# Patient Record
Sex: MALE | Race: WHITE | NOT HISPANIC OR LATINO | Employment: OTHER | URBAN - NONMETROPOLITAN AREA
[De-identification: names, ages, dates, MRNs, and addresses within clinical notes are randomized per-mention and may not be internally consistent; named-entity substitution may affect disease eponyms.]

---

## 2019-07-18 ENCOUNTER — TRANSCRIBE ORDERS (OUTPATIENT)
Dept: CARDIOLOGY | Facility: HOSPITAL | Age: 64
End: 2019-07-18

## 2019-07-18 DIAGNOSIS — R06.02 SHORTNESS OF BREATH: Primary | ICD-10-CM

## 2019-07-18 DIAGNOSIS — J43.9 PULMONARY EMPHYSEMA, UNSPECIFIED EMPHYSEMA TYPE (HCC): ICD-10-CM

## 2019-07-23 ENCOUNTER — HOSPITAL ENCOUNTER (OUTPATIENT)
Dept: CARDIOLOGY | Facility: HOSPITAL | Age: 64
Discharge: HOME | End: 2019-07-23
Admitting: SPECIALIST

## 2019-07-23 DIAGNOSIS — J43.9 PULMONARY EMPHYSEMA, UNSPECIFIED EMPHYSEMA TYPE (HCC): ICD-10-CM

## 2019-07-23 DIAGNOSIS — R06.02 SHORTNESS OF BREATH: ICD-10-CM

## 2019-07-23 PROCEDURE — 93306 TTE W/DOPPLER COMPLETE: CPT | Performed by: INTERNAL MEDICINE

## 2019-07-23 PROCEDURE — 93306 TTE W/DOPPLER COMPLETE: CPT

## 2019-08-08 ENCOUNTER — TELEPHONE (OUTPATIENT)
Dept: CARDIOLOGY | Facility: CLINIC | Age: 64
End: 2019-08-08

## 2019-08-08 LAB
BH CV ECHO MEAS - ACS: 2.5 CM
BH CV ECHO MEAS - AO MEAN PG: 3.7 MMHG
BH CV ECHO MEAS - AO ROOT AREA (BSA CORRECTED): 1.7
BH CV ECHO MEAS - AO ROOT AREA: 9.8 CM^2
BH CV ECHO MEAS - AO ROOT DIAM: 3.5 CM
BH CV ECHO MEAS - AO V2 MEAN: 90.8 CM/SEC
BH CV ECHO MEAS - AO V2 VTI: 29 CM
BH CV ECHO MEAS - BSA(HAYCOCK): 2.2 M^2
BH CV ECHO MEAS - BSA: 2.1 M^2
BH CV ECHO MEAS - BZI_BMI: 30.3 KILOGRAMS/M^2
BH CV ECHO MEAS - BZI_METRIC_HEIGHT: 177.8 CM
BH CV ECHO MEAS - BZI_METRIC_WEIGHT: 95.7 KG
BH CV ECHO MEAS - EDV(CUBED): 91.7 ML
BH CV ECHO MEAS - EDV(MOD-SP4): 64 ML
BH CV ECHO MEAS - EDV(TEICH): 92.9 ML
BH CV ECHO MEAS - EF(CUBED): 42.6 %
BH CV ECHO MEAS - EF(MOD-SP4): 57.8 %
BH CV ECHO MEAS - EF(TEICH): 35.4 %
BH CV ECHO MEAS - ESV(CUBED): 52.7 ML
BH CV ECHO MEAS - ESV(MOD-SP4): 27 ML
BH CV ECHO MEAS - ESV(TEICH): 60 ML
BH CV ECHO MEAS - FS: 16.9 %
BH CV ECHO MEAS - IVS/LVPW: 1
BH CV ECHO MEAS - IVSD: 1.3 CM
BH CV ECHO MEAS - LA DIMENSION: 4.6 CM
BH CV ECHO MEAS - LA/AO: 1.3
BH CV ECHO MEAS - LV DIASTOLIC VOL/BSA (35-75): 30 ML/M^2
BH CV ECHO MEAS - LV IVRT: 0.09 SEC
BH CV ECHO MEAS - LV MASS(C)D: 224.6 GRAMS
BH CV ECHO MEAS - LV MASS(C)DI: 105.2 GRAMS/M^2
BH CV ECHO MEAS - LV SYSTOLIC VOL/BSA (12-30): 12.6 ML/M^2
BH CV ECHO MEAS - LVIDD: 4.5 CM
BH CV ECHO MEAS - LVIDS: 3.7 CM
BH CV ECHO MEAS - LVLD AP4: 6.6 CM
BH CV ECHO MEAS - LVLS AP4: 5.8 CM
BH CV ECHO MEAS - LVOT AREA (M): 3.8 CM^2
BH CV ECHO MEAS - LVOT AREA: 3.7 CM^2
BH CV ECHO MEAS - LVOT DIAM: 2.2 CM
BH CV ECHO MEAS - LVPWD: 1.3 CM
BH CV ECHO MEAS - MV A MAX VEL: 67.1 CM/SEC
BH CV ECHO MEAS - MV DEC SLOPE: 305.5 CM/SEC^2
BH CV ECHO MEAS - MV E MAX VEL: 86.4 CM/SEC
BH CV ECHO MEAS - MV E/A: 1.3
BH CV ECHO MEAS - RAP SYSTOLE: 10 MMHG
BH CV ECHO MEAS - RVDD: 3.6 CM
BH CV ECHO MEAS - RVSP: 32.6 MMHG
BH CV ECHO MEAS - SI(AO): 132.7 ML/M^2
BH CV ECHO MEAS - SI(CUBED): 18.3 ML/M^2
BH CV ECHO MEAS - SI(MOD-SP4): 17.3 ML/M^2
BH CV ECHO MEAS - SI(TEICH): 15.4 ML/M^2
BH CV ECHO MEAS - SV(AO): 283.4 ML
BH CV ECHO MEAS - SV(CUBED): 39 ML
BH CV ECHO MEAS - SV(MOD-SP4): 37 ML
BH CV ECHO MEAS - SV(TEICH): 32.9 ML
BH CV ECHO MEAS - TR MAX VEL: 237.5 CM/SEC
MAXIMAL PREDICTED HEART RATE: 154 BPM
STRESS TARGET HR: 131 BPM

## 2019-08-08 NOTE — TELEPHONE ENCOUNTER
ECHO FAXED TO DR. WELLINGTON'S OFFICE. FAX CONFIRMATION RECEIVED. KYLAH,LPN        ----- Message from Nicko Irving sent at 8/7/2019  1:14 PM EDT -----  Just an FYI dr wells office has called twice wanting pts echo from 7-23-19 and it is still not read.

## 2022-01-03 ENCOUNTER — OFFICE VISIT (OUTPATIENT)
Dept: CARDIOLOGY | Facility: CLINIC | Age: 67
End: 2022-01-03

## 2022-01-03 VITALS
DIASTOLIC BLOOD PRESSURE: 83 MMHG | HEART RATE: 71 BPM | HEIGHT: 74 IN | BODY MASS INDEX: 32.73 KG/M2 | SYSTOLIC BLOOD PRESSURE: 131 MMHG | WEIGHT: 255 LBS | OXYGEN SATURATION: 97 %

## 2022-01-03 DIAGNOSIS — R06.02 SHORTNESS OF BREATH: ICD-10-CM

## 2022-01-03 DIAGNOSIS — R07.2 PRECORDIAL PAIN: Primary | ICD-10-CM

## 2022-01-03 DIAGNOSIS — I10 PRIMARY HYPERTENSION: ICD-10-CM

## 2022-01-03 DIAGNOSIS — I25.119 CORONARY ARTERY DISEASE INVOLVING NATIVE CORONARY ARTERY OF NATIVE HEART WITH ANGINA PECTORIS: ICD-10-CM

## 2022-01-03 PROCEDURE — 93000 ELECTROCARDIOGRAM COMPLETE: CPT | Performed by: PHYSICIAN ASSISTANT

## 2022-01-03 PROCEDURE — 99204 OFFICE O/P NEW MOD 45 MIN: CPT | Performed by: PHYSICIAN ASSISTANT

## 2022-01-03 RX ORDER — FUROSEMIDE 40 MG/1
40 TABLET ORAL DAILY
COMMUNITY

## 2022-01-03 RX ORDER — TRAZODONE HYDROCHLORIDE 100 MG/1
250 TABLET ORAL NIGHTLY
COMMUNITY

## 2022-01-03 RX ORDER — OMEPRAZOLE 20 MG/1
20 CAPSULE, DELAYED RELEASE ORAL DAILY
COMMUNITY

## 2022-01-03 RX ORDER — PREGABALIN 100 MG/1
100 CAPSULE ORAL 2 TIMES DAILY
COMMUNITY
End: 2022-11-01

## 2022-01-03 RX ORDER — ROSUVASTATIN CALCIUM 40 MG/1
40 TABLET, COATED ORAL DAILY
COMMUNITY

## 2022-01-03 RX ORDER — MELOXICAM 15 MG/1
15 TABLET ORAL DAILY PRN
COMMUNITY

## 2022-01-03 RX ORDER — CLOPIDOGREL BISULFATE 75 MG/1
75 TABLET ORAL DAILY
COMMUNITY

## 2022-01-03 RX ORDER — ARIPIPRAZOLE 15 MG/1
7.5 TABLET ORAL DAILY
COMMUNITY

## 2022-01-03 RX ORDER — CARVEDILOL 12.5 MG/1
6.25 TABLET ORAL 2 TIMES DAILY
COMMUNITY

## 2022-01-03 RX ORDER — LISINOPRIL 5 MG/1
5 TABLET ORAL NIGHTLY
COMMUNITY

## 2022-01-03 RX ORDER — ISOSORBIDE DINITRATE 30 MG/1
30 TABLET ORAL DAILY
COMMUNITY

## 2022-01-03 RX ORDER — PRAZOSIN HYDROCHLORIDE 2 MG/1
8 CAPSULE ORAL NIGHTLY
COMMUNITY

## 2022-01-03 RX ORDER — CYCLOBENZAPRINE HCL 10 MG
10 TABLET ORAL 3 TIMES DAILY PRN
COMMUNITY

## 2022-01-03 RX ORDER — ALLOPURINOL 300 MG/1
450 TABLET ORAL DAILY
COMMUNITY

## 2022-01-03 NOTE — PROGRESS NOTES
Subjective   Shayne Neal is a 66 y.o. male     Chief Complaint   Patient presents with   • Establish Care   • Coronary Artery Disease     CT chest 8/04/21   • Shortness of Breath   Problem List:     1. Coronary artery disease with history of CABG and multiple stenting procedures otherwise.  1.1 Cath, August 2011 indicated patent DIEGO to LAD, patent stent and vein graft to circumflex, and patent stent to the RCA. Vein graft to the RCA was occluded. Medical management was recommended.  1.2 Recent cath, March 2015 in the setting of non-ST elevation MI with thrombectomy in the RCA and nonobstructive disease otherwise.   2. Hypertension  3. Dyslipidemia  4. Diabetes mellitus  5. Chronic kidney disease      6. COPD    HPI  The patient presents to the clinic today to reestablish cardiovascular care.  We had followed this gentleman given cardiovascular history as outlined above, but the patient was lost to follow-up as [facility]  He is referred back now just for repeat cardiac evaluation and work-up because of symptoms as above.  The patient does note ongoing fatigue and dyspnea.  This has now become significant and is limiting.  The patient does note chest tightness.  This occurs with exertion and with stress.  All symptoms are progressing.  They are now limiting.  He does report minimize symptoms when resting and symptoms diminished when taking nitro.  He has no failure nor dysrhythmic symptoms.  To his knowledge, he is tolerating all medications without complication.  Laboratories are followed routinely with his primary care provider and has been mostly normal as of recent.  A CT of the chest performed by his neurosurgical team previously did suggest advanced atherosclerotic plaque of the coronary arteries.  After reviewing this and seeing symptoms as above, it was recommended he have a referral back to this clinic and for further evaluation in that regard.      Current Outpatient  Medications   Medication Sig Dispense Refill   • allopurinol (ZYLOPRIM) 300 MG tablet Take 300 mg by mouth Daily. 1 1/2     • ARIPiprazole (ABILIFY) 15 MG tablet Take 15 mg by mouth Daily.     • CARVEDILOL PO Take 125 mg by mouth.     • clopidogrel (Plavix) 75 MG tablet Take 75 mg by mouth Daily.     • cyclobenzaprine (FLEXERIL) 10 MG tablet Take 10 mg by mouth 3 (Three) Times a Day As Needed for Muscle Spasms.     • furosemide (LASIX) 40 MG tablet Take 40 mg by mouth 2 (Two) Times a Day.     • isosorbide dinitrate (ISORDIL) 30 MG tablet Take 30 mg by mouth 4 (Four) Times a Day.     • lisinopril (PRINIVIL,ZESTRIL) 5 MG tablet Take 5 mg by mouth Daily.     • meloxicam (MOBIC) 15 MG tablet Take 15 mg by mouth Daily.     • OMEPRAZOLE PO Take  by mouth.     • prazosin (MINIPRESS) 2 MG capsule Take 2 mg by mouth Every Night.     • pregabalin (LYRICA) 100 MG capsule Take 100 mg by mouth 2 (Two) Times a Day.     • rosuvastatin (CRESTOR) 40 MG tablet Take 40 mg by mouth Daily.     • traZODone (DESYREL) 100 MG tablet Take 100 mg by mouth Every Night.       No current facility-administered medications for this visit.       Erythromycin    Past Medical History:   Diagnosis Date   • Asthma    • Chronic kidney disease    • Colon cancer (HCC)    • COPD (chronic obstructive pulmonary disease) (HCC)    • Gout    • Hyperlipidemia    • Hypertension    • Neuropathy    • Stroke (HCC)     c- pap with oxygen        Social History     Socioeconomic History   • Marital status:    Tobacco Use   • Smoking status: Former Smoker   • Smokeless tobacco: Never Used   Substance and Sexual Activity   • Alcohol use: Never   • Drug use: Yes     Types: Marijuana   • Sexual activity: Defer       Family History   Problem Relation Age of Onset   • Hypertension Mother    • Heart disease Mother    • Diabetes Mother    • Hyperlipidemia Mother    • Hypertension Father    • Heart disease Father    • Hyperlipidemia Father        Review of Systems  "  Constitutional: Positive for fatigue. Negative for chills and fever.   HENT: Negative for congestion, rhinorrhea and sore throat.    Eyes: Positive for visual disturbance (reading glasses).   Respiratory: Positive for chest tightness and shortness of breath (oxygen 24/7). Negative for wheezing.    Cardiovascular: Positive for chest pain, palpitations and leg swelling.   Gastrointestinal: Negative.    Endocrine: Negative.    Genitourinary: Negative.    Musculoskeletal: Positive for arthralgias, back pain and neck pain.   Skin: Negative.  Negative for rash and wound.   Allergic/Immunologic: Negative for environmental allergies.   Neurological: Positive for dizziness and headaches. Negative for weakness and numbness.   Hematological: Bruises/bleeds easily (bruises/ bleeds).   Psychiatric/Behavioral: Positive for sleep disturbance (c-pap / oxygen ).       Objective     Vitals:    01/03/22 1051   BP: 131/83   BP Location: Left arm   Patient Position: Sitting   Pulse: 71   SpO2: 97%   Weight: 116 kg (255 lb)   Height: 188 cm (74\")        /83 (BP Location: Left arm, Patient Position: Sitting)   Pulse 71   Ht 188 cm (74\")   Wt 116 kg (255 lb)   SpO2 97%   BMI 32.74 kg/m²      Lab Results (most recent)     None          Physical Exam  Vitals and nursing note reviewed.   Constitutional:       General: He is not in acute distress.     Appearance: He is well-developed.   HENT:      Head: Normocephalic and atraumatic.   Eyes:      Conjunctiva/sclera: Conjunctivae normal.      Pupils: Pupils are equal, round, and reactive to light.   Neck:      Vascular: No JVD.      Trachea: No tracheal deviation.   Cardiovascular:      Rate and Rhythm: Normal rate and regular rhythm.      Heart sounds: Murmur heard.    Systolic (LLSB) murmur is present with a grade of 1/6.      Pulmonary:      Effort: Pulmonary effort is normal.      Breath sounds: Normal breath sounds.   Abdominal:      General: Bowel sounds are normal. There is " no distension.      Palpations: Abdomen is soft. There is no mass.      Tenderness: There is no abdominal tenderness. There is no guarding or rebound.   Musculoskeletal:         General: No tenderness or deformity. Normal range of motion.      Cervical back: Normal range of motion and neck supple.      Right lower le+ Edema present.      Left lower le+ Edema present.   Skin:     General: Skin is warm and dry.      Coloration: Skin is not pale.      Findings: No erythema or rash.   Neurological:      Mental Status: He is alert and oriented to person, place, and time.   Psychiatric:         Behavior: Behavior normal.         Thought Content: Thought content normal.         Judgment: Judgment normal.         Procedure     ECG 12 Lead    Date/Time: 1/3/2022 11:03 AM  Performed by: John Barnard PA  Authorized by: John Barnard PA   Comparison: not compared with previous ECG   Comments: Sinus rhythm, rate 73, normal axis, inferior wall MI age-indeterminate, possible lateral wall MI age-indeterminate, nonspecific changes otherwise, no acute changes noted.                 Assessment/Plan      Diagnosis Plan   1. Precordial pain  Adult Transthoracic Echo Complete W/ Cont if Necessary Per Protocol    Stress Test With Myocardial Perfusion One Day   2. Shortness of breath  Adult Transthoracic Echo Complete W/ Cont if Necessary Per Protocol    Stress Test With Myocardial Perfusion One Day   3. Coronary artery disease involving native coronary artery of native heart with angina pectoris (HCC)  Adult Transthoracic Echo Complete W/ Cont if Necessary Per Protocol    Stress Test With Myocardial Perfusion One Day   4. Primary hypertension  Adult Transthoracic Echo Complete W/ Cont if Necessary Per Protocol    Stress Test With Myocardial Perfusion One Day     1.  The patient presents back to establish cardiovascular care.  Recent CT supported advanced coronary artery calcification.  He has ongoing symptoms of chest  discomfort and dyspnea, both concerning for anginal equivalent symptoms.  I would schedule the patient for a nuclear stress test for ischemia assessment.    2.  We will also schedule for an echo to evaluate LV size, LV function, valvular morphologies, and cardiac structure otherwise.    3.  I feel that the patient is on appropriate medications and will make no adjustments in that regard.    4.  We will see him back after the above studies and recommended further at that time.  He will call for any issues prior to follow-up.            Advance Care Planning   ACP discussion was declined by the patient. Patient does not have an advance directive, declines further assistance.        Electronically signed by:

## 2022-01-03 NOTE — PATIENT INSTRUCTIONS

## 2022-02-15 ENCOUNTER — HOSPITAL ENCOUNTER (OUTPATIENT)
Dept: CARDIOLOGY | Facility: HOSPITAL | Age: 67
Discharge: HOME | End: 2022-02-15

## 2022-02-15 ENCOUNTER — HOSPITAL ENCOUNTER (OUTPATIENT)
Dept: CARDIOLOGY | Facility: HOSPITAL | Age: 67
End: 2022-02-15

## 2022-02-15 DIAGNOSIS — I10 PRIMARY HYPERTENSION: ICD-10-CM

## 2022-02-15 DIAGNOSIS — R07.2 PRECORDIAL PAIN: ICD-10-CM

## 2022-02-15 DIAGNOSIS — I25.119 CORONARY ARTERY DISEASE INVOLVING NATIVE CORONARY ARTERY OF NATIVE HEART WITH ANGINA PECTORIS: ICD-10-CM

## 2022-02-15 DIAGNOSIS — R42 DIZZINESS: ICD-10-CM

## 2022-02-15 DIAGNOSIS — R42 DIZZINESS: Primary | ICD-10-CM

## 2022-02-15 DIAGNOSIS — R06.02 SHORTNESS OF BREATH: ICD-10-CM

## 2022-02-15 PROCEDURE — 93306 TTE W/DOPPLER COMPLETE: CPT

## 2022-02-15 PROCEDURE — 78452 HT MUSCLE IMAGE SPECT MULT: CPT

## 2022-02-15 PROCEDURE — 93880 EXTRACRANIAL BILAT STUDY: CPT

## 2022-02-15 PROCEDURE — 78452 HT MUSCLE IMAGE SPECT MULT: CPT | Performed by: INTERNAL MEDICINE

## 2022-02-15 PROCEDURE — 93880 EXTRACRANIAL BILAT STUDY: CPT | Performed by: INTERNAL MEDICINE

## 2022-02-15 PROCEDURE — 93017 CV STRESS TEST TRACING ONLY: CPT

## 2022-02-15 PROCEDURE — 93018 CV STRESS TEST I&R ONLY: CPT | Performed by: INTERNAL MEDICINE

## 2022-02-15 PROCEDURE — A9500 TC99M SESTAMIBI: HCPCS | Performed by: INTERNAL MEDICINE

## 2022-02-15 PROCEDURE — 93306 TTE W/DOPPLER COMPLETE: CPT | Performed by: INTERNAL MEDICINE

## 2022-02-15 PROCEDURE — 0 TECHNETIUM SESTAMIBI: Performed by: INTERNAL MEDICINE

## 2022-02-15 RX ADMIN — TECHNETIUM TC 99M SESTAMIBI 1 DOSE: 1 INJECTION INTRAVENOUS at 10:11

## 2022-02-17 ENCOUNTER — HOSPITAL ENCOUNTER (OUTPATIENT)
Dept: CARDIOLOGY | Facility: HOSPITAL | Age: 67
Discharge: HOME | End: 2022-02-17

## 2022-02-17 PROCEDURE — 0 TECHNETIUM SESTAMIBI: Performed by: INTERNAL MEDICINE

## 2022-02-17 PROCEDURE — A9500 TC99M SESTAMIBI: HCPCS | Performed by: INTERNAL MEDICINE

## 2022-02-17 PROCEDURE — 25010000002 REGADENOSON 0.4 MG/5ML SOLUTION: Performed by: INTERNAL MEDICINE

## 2022-02-17 RX ADMIN — REGADENOSON 0.4 MG: 0.08 INJECTION, SOLUTION INTRAVENOUS at 12:17

## 2022-02-17 RX ADMIN — TECHNETIUM TC 99M SESTAMIBI 1 DOSE: 1 INJECTION INTRAVENOUS at 12:18

## 2022-02-26 LAB
BH CV REST NUCLEAR ISOTOPE DOSE: 10 MCI
BH CV STRESS COMMENTS STAGE 1: NORMAL
BH CV STRESS DOSE REGADENOSON STAGE 1: 0.4
BH CV STRESS DURATION MIN STAGE 1: 0
BH CV STRESS DURATION SEC STAGE 1: 10
BH CV STRESS NUCLEAR ISOTOPE DOSE: 20 MCI
BH CV STRESS PROTOCOL 1: NORMAL
BH CV STRESS RECOVERY BP: NORMAL MMHG
BH CV STRESS RECOVERY HR: 82 BPM
BH CV STRESS STAGE 1: 1
MAXIMAL PREDICTED HEART RATE: 152 BPM
PERCENT MAX PREDICTED HR: 53.29 %
STRESS BASELINE BP: NORMAL MMHG
STRESS BASELINE HR: 83 BPM
STRESS PERCENT HR: 63 %
STRESS POST PEAK BP: NORMAL MMHG
STRESS POST PEAK HR: 81 BPM
STRESS TARGET HR: 129 BPM

## 2022-02-27 LAB
BH CV ECHO MEAS - ACS: 2.2 CM
BH CV ECHO MEAS - AO MAX PG: 5.6 MMHG
BH CV ECHO MEAS - AO MEAN PG: 3 MMHG
BH CV ECHO MEAS - AO ROOT AREA (BSA CORRECTED): 1.3
BH CV ECHO MEAS - AO ROOT AREA: 8 CM^2
BH CV ECHO MEAS - AO ROOT DIAM: 3.2 CM
BH CV ECHO MEAS - AO V2 MAX: 118 CM/SEC
BH CV ECHO MEAS - AO V2 MEAN: 86.4 CM/SEC
BH CV ECHO MEAS - AO V2 VTI: 27 CM
BH CV ECHO MEAS - BSA(HAYCOCK): 2.5 M^2
BH CV ECHO MEAS - BSA(HAYCOCK): 2.5 M^2
BH CV ECHO MEAS - BSA: 2.4 M^2
BH CV ECHO MEAS - BSA: 2.4 M^2
BH CV ECHO MEAS - BZI_BMI: 32.7 KILOGRAMS/M^2
BH CV ECHO MEAS - BZI_BMI: 32.7 KILOGRAMS/M^2
BH CV ECHO MEAS - BZI_METRIC_HEIGHT: 188 CM
BH CV ECHO MEAS - BZI_METRIC_HEIGHT: 188 CM
BH CV ECHO MEAS - BZI_METRIC_WEIGHT: 115.7 KG
BH CV ECHO MEAS - BZI_METRIC_WEIGHT: 115.7 KG
BH CV ECHO MEAS - EDV(CUBED): 85.2 ML
BH CV ECHO MEAS - EDV(MOD-SP4): 54.6 ML
BH CV ECHO MEAS - EDV(TEICH): 87.7 ML
BH CV ECHO MEAS - EF(CUBED): 65.7 %
BH CV ECHO MEAS - EF(MOD-SP4): 39.2 %
BH CV ECHO MEAS - EF(TEICH): 57.4 %
BH CV ECHO MEAS - EF_3D-VOL: 30 %
BH CV ECHO MEAS - ESV(CUBED): 29.2 ML
BH CV ECHO MEAS - ESV(MOD-SP4): 33.2 ML
BH CV ECHO MEAS - ESV(TEICH): 37.3 ML
BH CV ECHO MEAS - FS: 30 %
BH CV ECHO MEAS - IVS/LVPW: 0.91
BH CV ECHO MEAS - IVSD: 1.5 CM
BH CV ECHO MEAS - LA DIMENSION: 4.6 CM
BH CV ECHO MEAS - LA/AO: 1.4
BH CV ECHO MEAS - LV DIASTOLIC VOL/BSA (35-75): 22.7 ML/M^2
BH CV ECHO MEAS - LV IVRT: 0.11 SEC
BH CV ECHO MEAS - LV MASS(C)D: 273.8 GRAMS
BH CV ECHO MEAS - LV MASS(C)DI: 113.6 GRAMS/M^2
BH CV ECHO MEAS - LV SYSTOLIC VOL/BSA (12-30): 13.8 ML/M^2
BH CV ECHO MEAS - LVIDD: 4.4 CM
BH CV ECHO MEAS - LVIDS: 3.1 CM
BH CV ECHO MEAS - LVLD AP4: 6.5 CM
BH CV ECHO MEAS - LVLS AP4: 5.9 CM
BH CV ECHO MEAS - LVOT AREA (M): 3.5 CM^2
BH CV ECHO MEAS - LVOT AREA: 3.5 CM^2
BH CV ECHO MEAS - LVOT DIAM: 2.1 CM
BH CV ECHO MEAS - LVPWD: 1.6 CM
BH CV ECHO MEAS - MV A MAX VEL: 65.6 CM/SEC
BH CV ECHO MEAS - MV DEC SLOPE: 224 CM/SEC^2
BH CV ECHO MEAS - MV E MAX VEL: 69 CM/SEC
BH CV ECHO MEAS - MV E/A: 1.1
BH CV ECHO MEAS - RAP SYSTOLE: 10 MMHG
BH CV ECHO MEAS - RVDD: 3.3 CM
BH CV ECHO MEAS - RVSP: 32.7 MMHG
BH CV ECHO MEAS - SI(AO): 90.1 ML/M^2
BH CV ECHO MEAS - SI(CUBED): 23.2 ML/M^2
BH CV ECHO MEAS - SI(MOD-SP4): 8.9 ML/M^2
BH CV ECHO MEAS - SI(TEICH): 20.9 ML/M^2
BH CV ECHO MEAS - SV(AO): 217.1 ML
BH CV ECHO MEAS - SV(CUBED): 56 ML
BH CV ECHO MEAS - SV(MOD-SP4): 21.4 ML
BH CV ECHO MEAS - SV(TEICH): 50.4 ML
BH CV ECHO MEAS - TR MAX VEL: 238 CM/SEC
BH CV XLRA MEAS LEFT BULB EDV: -32.2 CM/SEC
BH CV XLRA MEAS LEFT BULB PSV: -96.6 CM/SEC
BH CV XLRA MEAS LEFT CCA RATIO VEL: -87.2 CM/SEC
BH CV XLRA MEAS LEFT DIST CCA EDV: -27.5 CM/SEC
BH CV XLRA MEAS LEFT DIST CCA PSV: -88 CM/SEC
BH CV XLRA MEAS LEFT DIST ICA EDV: -59.1 CM/SEC
BH CV XLRA MEAS LEFT DIST ICA PSV: -130.7 CM/SEC
BH CV XLRA MEAS LEFT ICA RATIO VEL: -130 CM/SEC
BH CV XLRA MEAS LEFT ICA/CCA RATIO: 1.5
BH CV XLRA MEAS LEFT MID ICA EDV: -53.4 CM/SEC
BH CV XLRA MEAS LEFT MID ICA PSV: -124.5 CM/SEC
BH CV XLRA MEAS LEFT PROX CCA EDV: 31.1 CM/SEC
BH CV XLRA MEAS LEFT PROX CCA PSV: 106.4 CM/SEC
BH CV XLRA MEAS LEFT PROX ECA EDV: -16.2 CM/SEC
BH CV XLRA MEAS LEFT PROX ECA PSV: -82.9 CM/SEC
BH CV XLRA MEAS LEFT PROX ICA EDV: -35.8 CM/SEC
BH CV XLRA MEAS LEFT PROX ICA PSV: -96.8 CM/SEC
BH CV XLRA MEAS LEFT VERTEBRAL A EDV: 17 CM/SEC
BH CV XLRA MEAS LEFT VERTEBRAL A PSV: 52.8 CM/SEC
BH CV XLRA MEAS RIGHT BULB EDV: -22 CM/SEC
BH CV XLRA MEAS RIGHT BULB PSV: -84.9 CM/SEC
BH CV XLRA MEAS RIGHT CCA RATIO VEL: 102 CM/SEC
BH CV XLRA MEAS RIGHT DIST CCA EDV: 36.9 CM/SEC
BH CV XLRA MEAS RIGHT DIST CCA PSV: 102.9 CM/SEC
BH CV XLRA MEAS RIGHT DIST ICA EDV: -41.3 CM/SEC
BH CV XLRA MEAS RIGHT DIST ICA PSV: -92.3 CM/SEC
BH CV XLRA MEAS RIGHT ICA RATIO VEL: -111 CM/SEC
BH CV XLRA MEAS RIGHT ICA/CCA RATIO: -1.1
BH CV XLRA MEAS RIGHT MID ICA EDV: -48.1 CM/SEC
BH CV XLRA MEAS RIGHT MID ICA PSV: -112 CM/SEC
BH CV XLRA MEAS RIGHT PROX CCA EDV: 24.4 CM/SEC
BH CV XLRA MEAS RIGHT PROX CCA PSV: 106.1 CM/SEC
BH CV XLRA MEAS RIGHT PROX ECA EDV: -40.3 CM/SEC
BH CV XLRA MEAS RIGHT PROX ECA PSV: -160.1 CM/SEC
BH CV XLRA MEAS RIGHT PROX ICA EDV: -41.3 CM/SEC
BH CV XLRA MEAS RIGHT PROX ICA PSV: -106.1 CM/SEC
BH CV XLRA MEAS RIGHT VERTEBRAL A EDV: -16.8 CM/SEC
BH CV XLRA MEAS RIGHT VERTEBRAL A PSV: -46.8 CM/SEC
MAXIMAL PREDICTED HEART RATE: 152 BPM
STRESS TARGET HR: 129 BPM

## 2022-03-07 ENCOUNTER — TELEPHONE (OUTPATIENT)
Dept: CARDIOLOGY | Facility: CLINIC | Age: 67
End: 2022-03-07

## 2022-03-07 NOTE — TELEPHONE ENCOUNTER
Spoke to patient on stress test - see note below     Patient verbalized understanding     AT [facility]         ----- Message from KIKI Barba sent at 2/28/2022  8:39 AM EST -----  Routine follow-up.  No evidence of ischemia.  For continued symptoms, please screen patient and if any abnormalities or symptoms are noted, please let me know.

## 2022-03-07 NOTE — TELEPHONE ENCOUNTER
Spoke to patient on carotid duplex - see note below     Patient verbalized understanding     AT [facility]      ----- Message from KIKI Barba sent at 2/28/2022  8:31 AM EST -----  Moderate disease.  Statin and antiplatelet therapy.  Routine follow-up otherwise.

## 2022-03-07 NOTE — TELEPHONE ENCOUNTER
Spoke to patient on echo - heart pump function good - no indication of abnormalities     Patient verbalized understanding     AT [facility]         ----- Message from KIKI Barba sent at 2/28/2022  8:28 AM EST -----  Follow-up pending stress test findings.

## 2022-07-06 ENCOUNTER — OFFICE VISIT (OUTPATIENT)
Dept: CARDIOLOGY | Facility: CLINIC | Age: 67
End: 2022-07-06

## 2022-07-06 VITALS
SYSTOLIC BLOOD PRESSURE: 111 MMHG | OXYGEN SATURATION: 91 % | HEART RATE: 71 BPM | BODY MASS INDEX: 33.32 KG/M2 | HEIGHT: 74 IN | DIASTOLIC BLOOD PRESSURE: 72 MMHG | WEIGHT: 259.6 LBS

## 2022-07-06 DIAGNOSIS — I25.119 CORONARY ARTERY DISEASE INVOLVING NATIVE CORONARY ARTERY OF NATIVE HEART WITH ANGINA PECTORIS: Primary | ICD-10-CM

## 2022-07-06 DIAGNOSIS — E78.2 MIXED HYPERLIPIDEMIA: ICD-10-CM

## 2022-07-06 DIAGNOSIS — R06.02 SHORTNESS OF BREATH: ICD-10-CM

## 2022-07-06 DIAGNOSIS — I10 PRIMARY HYPERTENSION: ICD-10-CM

## 2022-07-06 PROCEDURE — 99214 OFFICE O/P EST MOD 30 MIN: CPT | Performed by: PHYSICIAN ASSISTANT

## 2022-07-06 RX ORDER — NITROGLYCERIN 0.4 MG/1
TABLET SUBLINGUAL
Qty: 25 TABLET | Refills: 2 | Status: SHIPPED | OUTPATIENT
Start: 2022-07-06

## 2022-07-06 NOTE — PROGRESS NOTES
Problem list     Subjective   Shayne Neal is a 67 y.o. male     Chief Complaint   Patient presents with   • Follow-up     6 month / testing review    • Chest Pain   Problem List:     1. Coronary artery disease with history of CABG and multiple stenting procedures otherwise.  1.1 Cath, August 2011 indicated patent DIEGO to LAD, patent stent and vein graft to circumflex, and patent stent to the RCA. Vein graft to the RCA was occluded. Medical management was recommended.  1.2 Recent cath, March 2015 in the setting of non-ST elevation MI with thrombectomy in the RCA and nonobstructive disease otherwise.   2. Hypertension  3. Dyslipidemia  4. Diabetes mellitus  5. Chronic kidney disease      6. COPD    HPI  The patient presents to the clinic today for follow-up of study results and to review clinical course otherwise.  We had seen him to reestablish cardiovascular care at last evaluation given cardiac history and symptoms.  He was scheduled for stress, echo, and carotid duplex.  Stress test indicated no evidence of ischemia.  Echo supported low normal to mildly depressed systolic function with an EF of 45 to 50%, mild LVH, grade 1 diastolic dysfunction, no significant valvular nor structural abnormalities otherwise.  Carotid duplex indicated nonobstructive disease bilaterally.  Clinically, the patient appears to be doing well.  He has stable chest pain.  He has nothing severe enough even to take nitro.  His dyspnea is at baseline.  He has no failure nor dysrhythmic symptoms.  His dyspnea has been limiting his of recent, but he feels that this was related to weight and deconditioning.  He does not feel that symptoms are severe enough that he would want to pursue further evaluation.  The patient has no failure nor significant dysrhythmic symptoms.  He has no further complaints.    Current Outpatient Medications on File Prior to Visit   Medication Sig Dispense Refill   • allopurinol (ZYLOPRIM) 300 MG tablet Take 300 mg  by mouth Daily. 1 1/2     • ARIPiprazole (ABILIFY) 15 MG tablet Take 15 mg by mouth Daily.     • CARVEDILOL PO Take 125 mg by mouth.     • clopidogrel (PLAVIX) 75 MG tablet Take 75 mg by mouth Daily.     • cyclobenzaprine (FLEXERIL) 10 MG tablet Take 10 mg by mouth 3 (Three) Times a Day As Needed for Muscle Spasms.     • furosemide (LASIX) 40 MG tablet Take 40 mg by mouth 2 (Two) Times a Day.     • isosorbide dinitrate (ISORDIL) 30 MG tablet Take 30 mg by mouth 4 (Four) Times a Day.     • lisinopril (PRINIVIL,ZESTRIL) 5 MG tablet Take 5 mg by mouth Daily.     • meloxicam (MOBIC) 15 MG tablet Take 15 mg by mouth Daily.     • OMEPRAZOLE PO Take  by mouth.     • prazosin (MINIPRESS) 2 MG capsule Take 2 mg by mouth Every Night.     • pregabalin (LYRICA) 100 MG capsule Take 100 mg by mouth 2 (Two) Times a Day.     • rosuvastatin (CRESTOR) 40 MG tablet Take 40 mg by mouth Daily.     • traZODone (DESYREL) 100 MG tablet Take 100 mg by mouth Every Night.       No current facility-administered medications on file prior to visit.       Erythromycin    Past Medical History:   Diagnosis Date   • Asthma    • Chronic kidney disease    • Colon cancer (HCC)    • COPD (chronic obstructive pulmonary disease) (HCC)    • Gout    • Hyperlipidemia    • Hypertension    • Neuropathy    • Stroke (HCC)     c- pap with oxygen        Social History     Socioeconomic History   • Marital status:    Tobacco Use   • Smoking status: Former Smoker   • Smokeless tobacco: Never Used   Substance and Sexual Activity   • Alcohol use: Never   • Drug use: Yes     Types: Marijuana   • Sexual activity: Defer       Family History   Problem Relation Age of Onset   • Hypertension Mother    • Heart disease Mother    • Diabetes Mother    • Hyperlipidemia Mother    • Hypertension Father    • Heart disease Father    • Hyperlipidemia Father        Review of Systems   Constitutional: Negative.  Negative for chills, fatigue and fever.   HENT: Negative for  "congestion, rhinorrhea and sore throat.    Eyes: Positive for visual disturbance (reading glasses).   Respiratory: Positive for shortness of breath. Negative for chest tightness and wheezing.    Cardiovascular: Positive for leg swelling. Negative for chest pain and palpitations.   Gastrointestinal: Negative.    Endocrine: Negative.    Genitourinary: Negative.    Musculoskeletal: Positive for arthralgias and back pain. Negative for neck pain.   Skin: Negative.  Negative for rash and wound.   Allergic/Immunologic: Negative.  Negative for environmental allergies.   Neurological: Positive for headaches. Negative for dizziness, weakness and numbness.   Hematological: Bruises/bleeds easily.   Psychiatric/Behavioral: Positive for sleep disturbance (cpap / oxygen ).       Objective   Vitals:    07/06/22 0920   BP: 111/72   BP Location: Left arm   Patient Position: Sitting   Pulse: 71   SpO2: 91%   Weight: 118 kg (259 lb 9.6 oz)   Height: 188 cm (74\")      /72 (BP Location: Left arm, Patient Position: Sitting)   Pulse 71   Ht 188 cm (74\")   Wt 118 kg (259 lb 9.6 oz)   SpO2 91%   BMI 33.33 kg/m²    Lab Results (most recent)     None        Physical Exam  Vitals and nursing note reviewed.   Constitutional:       General: He is not in acute distress.     Appearance: He is well-developed.   HENT:      Head: Normocephalic and atraumatic.   Eyes:      Conjunctiva/sclera: Conjunctivae normal.      Pupils: Pupils are equal, round, and reactive to light.   Neck:      Vascular: No JVD.      Trachea: No tracheal deviation.   Cardiovascular:      Rate and Rhythm: Normal rate and regular rhythm.      Heart sounds: Normal heart sounds.   Pulmonary:      Effort: Pulmonary effort is normal.      Breath sounds: Normal breath sounds.   Abdominal:      General: Bowel sounds are normal. There is no distension.      Palpations: Abdomen is soft. There is no mass.      Tenderness: There is no abdominal tenderness. There is no guarding " or rebound.   Musculoskeletal:         General: No tenderness or deformity. Normal range of motion.      Cervical back: Normal range of motion and neck supple.   Skin:     General: Skin is warm and dry.      Coloration: Skin is not pale.      Findings: No erythema or rash.   Neurological:      Mental Status: He is alert and oriented to person, place, and time.   Psychiatric:         Behavior: Behavior normal.         Thought Content: Thought content normal.         Judgment: Judgment normal.           Procedure   Procedures       Assessment & Plan      Diagnosis Plan   1. Coronary artery disease involving native coronary artery of native heart with angina pectoris (HCC)  CBC & Differential    Comprehensive Metabolic Panel    Lipid Panel    TSH   2. Shortness of breath  CBC & Differential    Comprehensive Metabolic Panel    Lipid Panel    TSH   3. Primary hypertension  CBC & Differential    Comprehensive Metabolic Panel    Lipid Panel    TSH   4. Mixed hyperlipidemia  CBC & Differential    Comprehensive Metabolic Panel    Lipid Panel    TSH     1.  Stress test and echo studies were mostly at baseline for the patient.  He had mild ischemic cardiomyopathy with an EF of 45± percent.  We have discussed appropriate titration of diuretic regimen if needed for mild failure symptoms.  We discussed sodium restriction, failure precautions, etc.    2.  As he had no evidence of ischemia by stress test, I do not feel further evaluation is warranted.  I would continue medical regimen/medical management without change for now.  If he has any change in clinical course or has symptoms which should become a concern, we will see him immediately at that time    3.  I would like to repeat routine laboratories all as outlined above.    4.  We did give him a refill at his request of nitro.  Again we will continue medical regimen without change otherwise.    5.  As the patient seems to be doing fairly well for the most part, nothing further  and we will see him on 6-month intervals.             Advance Care Planning   ACP discussion was held with the patient during this visit. Patient does not have an advance directive, declines further assistance.              Electronically signed by:

## 2022-07-08 LAB
ALBUMIN SERPL-MCNC: 4.4 G/DL (ref 3.8–4.8)
ALBUMIN/GLOB SERPL: 2 {RATIO} (ref 1.2–2.2)
ALP SERPL-CCNC: 47 IU/L (ref 44–121)
ALT SERPL-CCNC: 25 IU/L (ref 0–44)
AMBIG ABBREV CMP14 DEFAULT: NORMAL
AMBIG ABBREV LP DEFAULT: NORMAL
AST SERPL-CCNC: 23 IU/L (ref 0–40)
BASOPHILS # BLD AUTO: 0 X10E3/UL (ref 0–0.2)
BASOPHILS NFR BLD AUTO: 0 %
BILIRUB SERPL-MCNC: 0.5 MG/DL (ref 0–1.2)
BUN SERPL-MCNC: 22 MG/DL (ref 8–27)
BUN/CREAT SERPL: 13 (ref 10–24)
CALCIUM SERPL-MCNC: 9.3 MG/DL (ref 8.6–10.2)
CHLORIDE SERPL-SCNC: 102 MMOL/L (ref 96–106)
CHOLEST SERPL-MCNC: 154 MG/DL (ref 100–199)
CO2 SERPL-SCNC: 26 MMOL/L (ref 20–29)
CREAT SERPL-MCNC: 1.65 MG/DL (ref 0.76–1.27)
EGFRCR SERPLBLD CKD-EPI 2021: 45 ML/MIN/1.73
EOSINOPHIL # BLD AUTO: 0.1 X10E3/UL (ref 0–0.4)
EOSINOPHIL NFR BLD AUTO: 3 %
ERYTHROCYTE [DISTWIDTH] IN BLOOD BY AUTOMATED COUNT: 13.8 % (ref 11.6–15.4)
GLOBULIN SER CALC-MCNC: 2.2 G/DL (ref 1.5–4.5)
GLUCOSE SERPL-MCNC: 114 MG/DL (ref 65–99)
HCT VFR BLD AUTO: 43.9 % (ref 37.5–51)
HDLC SERPL-MCNC: 28 MG/DL
HGB BLD-MCNC: 14.2 G/DL (ref 13–17.7)
IMM GRANULOCYTES # BLD AUTO: 0 X10E3/UL (ref 0–0.1)
IMM GRANULOCYTES NFR BLD AUTO: 0 %
LDLC SERPL CALC-MCNC: 85 MG/DL (ref 0–99)
LYMPHOCYTES # BLD AUTO: 1.4 X10E3/UL (ref 0.7–3.1)
LYMPHOCYTES NFR BLD AUTO: 29 %
MCH RBC QN AUTO: 31.4 PG (ref 26.6–33)
MCHC RBC AUTO-ENTMCNC: 32.3 G/DL (ref 31.5–35.7)
MCV RBC AUTO: 97 FL (ref 79–97)
MONOCYTES # BLD AUTO: 0.6 X10E3/UL (ref 0.1–0.9)
MONOCYTES NFR BLD AUTO: 12 %
NEUTROPHILS # BLD AUTO: 2.6 X10E3/UL (ref 1.4–7)
NEUTROPHILS NFR BLD AUTO: 56 %
PLATELET # BLD AUTO: 114 X10E3/UL (ref 150–450)
POTASSIUM SERPL-SCNC: 5.2 MMOL/L (ref 3.5–5.2)
PROT SERPL-MCNC: 6.6 G/DL (ref 6–8.5)
RBC # BLD AUTO: 4.52 X10E6/UL (ref 4.14–5.8)
SODIUM SERPL-SCNC: 144 MMOL/L (ref 134–144)
TRIGL SERPL-MCNC: 246 MG/DL (ref 0–149)
TSH SERPL DL<=0.005 MIU/L-ACNC: 3.06 UIU/ML (ref 0.45–4.5)
VLDLC SERPL CALC-MCNC: 41 MG/DL (ref 5–40)
WBC # BLD AUTO: 4.7 X10E3/UL (ref 3.4–10.8)

## 2022-07-11 ENCOUNTER — TELEPHONE (OUTPATIENT)
Dept: CARDIOLOGY | Facility: CLINIC | Age: 67
End: 2022-07-11

## 2022-07-11 NOTE — TELEPHONE ENCOUNTER
Spoke to PT on labs he verbalized understanding he has an upcoming appt with PCP and requested labs be sent.     AT [facility]     ----- Message from KIKI Barba sent at 7/8/2022  2:37 PM EDT -----  Renal insufficiency, creatinine 1.65.  Will need close monitoring.  Lipid parameters for elbow are mostly controlled.  Continue lifestyle modifications with improvement in hyperglycemia and hypertriglyceridemia.

## 2022-07-11 NOTE — TELEPHONE ENCOUNTER
Sending to PCP     AT [facility]       ----- Message from KIKI Barba sent at 7/11/2022  8:59 AM EDT -----  See prior recommendations.  Platelet counts are low, please forward to the PCP.

## 2022-10-10 ENCOUNTER — TELEPHONE (OUTPATIENT)
Dept: CARDIOLOGY | Facility: CLINIC | Age: 67
End: 2022-10-10

## 2022-10-10 NOTE — TELEPHONE ENCOUNTER
Mary Ellen gave me a completed clearance on pt from [facility].     Per John: acceptable risk, may hold Plavix 5-7 days, take Aspirin 81mg in absence of Plavix.       Faxed to [phone] Attn: Mary Martinez

## 2022-10-28 ENCOUNTER — TRANSCRIBE ORDERS (OUTPATIENT)
Dept: ADMINISTRATIVE | Facility: HOSPITAL | Age: 67
End: 2022-10-28

## 2022-10-28 ENCOUNTER — HOSPITAL ENCOUNTER (OUTPATIENT)
Dept: RESPIRATORY THERAPY | Facility: HOSPITAL | Age: 67
Discharge: HOME | End: 2022-10-28
Admitting: NURSE PRACTITIONER

## 2022-10-28 DIAGNOSIS — R06.02 SHORTNESS OF BREATH: ICD-10-CM

## 2022-10-28 DIAGNOSIS — R06.02 SHORTNESS OF BREATH: Primary | ICD-10-CM

## 2022-10-28 PROCEDURE — 94010 BREATHING CAPACITY TEST: CPT

## 2022-10-28 PROCEDURE — 94010 BREATHING CAPACITY TEST: CPT | Performed by: INTERNAL MEDICINE

## 2022-11-01 ENCOUNTER — PRE-ADMISSION TESTING (OUTPATIENT)
Dept: PREADMISSION TESTING | Facility: HOSPITAL | Age: 67
End: 2022-11-01

## 2022-11-01 VITALS — BODY MASS INDEX: 36.23 KG/M2 | WEIGHT: 253.09 LBS | HEIGHT: 70 IN

## 2022-11-01 LAB
ALBUMIN SERPL-MCNC: 3.8 G/DL (ref 3.5–5.2)
ALBUMIN/GLOB SERPL: 1.2 G/DL
ALP SERPL-CCNC: 57 U/L (ref 39–117)
ALT SERPL W P-5'-P-CCNC: 21 U/L (ref 1–41)
ANION GAP SERPL CALCULATED.3IONS-SCNC: 7 MMOL/L (ref 5–15)
APTT PPP: 30.5 SECONDS (ref 22–39)
AST SERPL-CCNC: 16 U/L (ref 1–40)
BASOPHILS # BLD AUTO: 0.04 10*3/MM3 (ref 0–0.2)
BASOPHILS NFR BLD AUTO: 0.6 % (ref 0–1.5)
BILIRUB SERPL-MCNC: 0.4 MG/DL (ref 0–1.2)
BUN SERPL-MCNC: 17 MG/DL (ref 8–23)
BUN/CREAT SERPL: 13.6 (ref 7–25)
CALCIUM SPEC-SCNC: 9.2 MG/DL (ref 8.6–10.5)
CHLORIDE SERPL-SCNC: 104 MMOL/L (ref 98–107)
CO2 SERPL-SCNC: 31 MMOL/L (ref 22–29)
CREAT SERPL-MCNC: 1.25 MG/DL (ref 0.76–1.27)
DEPRECATED RDW RBC AUTO: 49.1 FL (ref 37–54)
EGFRCR SERPLBLD CKD-EPI 2021: 62.3 ML/MIN/1.73
EOSINOPHIL # BLD AUTO: 0.16 10*3/MM3 (ref 0–0.4)
EOSINOPHIL NFR BLD AUTO: 2.3 % (ref 0.3–6.2)
ERYTHROCYTE [DISTWIDTH] IN BLOOD BY AUTOMATED COUNT: 13.4 % (ref 12.3–15.4)
GLOBULIN UR ELPH-MCNC: 3.2 GM/DL
GLUCOSE SERPL-MCNC: 91 MG/DL (ref 65–99)
HBA1C MFR BLD: 5.5 % (ref 4.8–5.6)
HCT VFR BLD AUTO: 45.3 % (ref 37.5–51)
HGB BLD-MCNC: 15.7 G/DL (ref 13–17.7)
IMM GRANULOCYTES # BLD AUTO: 0.04 10*3/MM3 (ref 0–0.05)
IMM GRANULOCYTES NFR BLD AUTO: 0.6 % (ref 0–0.5)
INR PPP: 1 (ref 0.84–1.13)
LYMPHOCYTES # BLD AUTO: 1.61 10*3/MM3 (ref 0.7–3.1)
LYMPHOCYTES NFR BLD AUTO: 22.9 % (ref 19.6–45.3)
MCH RBC QN AUTO: 34.6 PG (ref 26.6–33)
MCHC RBC AUTO-ENTMCNC: 34.7 G/DL (ref 31.5–35.7)
MCV RBC AUTO: 99.8 FL (ref 79–97)
MONOCYTES # BLD AUTO: 0.46 10*3/MM3 (ref 0.1–0.9)
MONOCYTES NFR BLD AUTO: 6.5 % (ref 5–12)
NEUTROPHILS NFR BLD AUTO: 4.72 10*3/MM3 (ref 1.7–7)
NEUTROPHILS NFR BLD AUTO: 67.1 % (ref 42.7–76)
NRBC BLD AUTO-RTO: 0 /100 WBC (ref 0–0.2)
PLATELET # BLD AUTO: 141 10*3/MM3 (ref 140–450)
PMV BLD AUTO: 11.3 FL (ref 6–12)
POTASSIUM SERPL-SCNC: 4.7 MMOL/L (ref 3.5–5.2)
PROT SERPL-MCNC: 7 G/DL (ref 6–8.5)
PROTHROMBIN TIME: 13.1 SECONDS (ref 11.4–14.4)
RBC # BLD AUTO: 4.54 10*6/MM3 (ref 4.14–5.8)
SODIUM SERPL-SCNC: 142 MMOL/L (ref 136–145)
WBC NRBC COR # BLD: 7.03 10*3/MM3 (ref 3.4–10.8)

## 2022-11-01 PROCEDURE — 85610 PROTHROMBIN TIME: CPT

## 2022-11-01 PROCEDURE — 80053 COMPREHEN METABOLIC PANEL: CPT

## 2022-11-01 PROCEDURE — 93005 ELECTROCARDIOGRAM TRACING: CPT

## 2022-11-01 PROCEDURE — 85025 COMPLETE CBC W/AUTO DIFF WBC: CPT

## 2022-11-01 PROCEDURE — 85730 THROMBOPLASTIN TIME PARTIAL: CPT

## 2022-11-01 PROCEDURE — 36415 COLL VENOUS BLD VENIPUNCTURE: CPT

## 2022-11-01 PROCEDURE — 93010 ELECTROCARDIOGRAM REPORT: CPT | Performed by: INTERNAL MEDICINE

## 2022-11-01 PROCEDURE — 83036 HEMOGLOBIN GLYCOSYLATED A1C: CPT

## 2022-11-01 RX ORDER — FUROSEMIDE 40 MG/1
40 TABLET ORAL DAILY PRN
COMMUNITY

## 2022-11-01 RX ORDER — DULOXETIN HYDROCHLORIDE 30 MG/1
60 CAPSULE, DELAYED RELEASE ORAL NIGHTLY
COMMUNITY

## 2022-11-01 RX ORDER — ASPIRIN 81 MG/1
81 TABLET ORAL DAILY
COMMUNITY

## 2022-11-01 RX ORDER — DULOXETIN HYDROCHLORIDE 30 MG/1
30 CAPSULE, DELAYED RELEASE ORAL EVERY MORNING
COMMUNITY

## 2022-11-01 NOTE — PAT
Patient's surgeon called in a prescription for Benzol Peroxide 5% wash to [facility].  Patient instructed to  from [facility] pharmacy that was submitted electronically.  Verbal and written instructions given regarding proper use of the Benzoyl Peroxide wash were provided to patient and/or famlily during PAT visit. Patient/family also instructed to complete Benzol Peroxide checklist and return it to Pre-op on the day of surgery.  Patient and/or family verbalized understanding.      Additionally, reinforced with patient to acquire this prescription from the [facility] pharmacy before leaving the hospital after PAT visit due to the potential unavailability at local pharmacies.      Per Anesthesia Request, patient instructed not to take their ACE/ARB medications on the AM of surgery.    Patient instructed to drink 20 ounces of Gatorade and it needs to be completed 1 hour (for Main OR patients) or 2 hours (scheduled  section & [facility] patients) before given arrival time for procedure (NO RED Gatorade)    Patient verbalized understanding.    Patient did not review general PAT education video as instructed in their preoperative information received from their surgeon.  One-on-one Pre Admission Testing general education provided during PAT visit.  Copies of PAT general education handouts (Incentive Spirometry, Meds to Beds Program, Patient Belongings, Pre-op skin preparation instructions, Blood Glucose testing, Visitor policy, Surgery FAQ, Code H) distributed to patient. Encouraged patient/family to read PAT general education handouts thoroughly and notify PAT staff with any questions or concerns. Patient instructed to bring PAT pass and completed skin prep sheet (if applicable) on the day of procedure. Patient verbalized understanding of all information and priority content.     Cardiac risk assessment on chart with ok to hold plavix 5-7days before surgery in epic from dr torre

## 2022-11-02 ENCOUNTER — ANESTHESIA EVENT (OUTPATIENT)
Dept: PERIOP | Facility: HOSPITAL | Age: 67
End: 2022-11-02

## 2022-11-02 LAB
QT INTERVAL: 400 MS
QTC INTERVAL: 444 MS

## 2022-11-02 RX ORDER — FAMOTIDINE 10 MG/ML
20 INJECTION, SOLUTION INTRAVENOUS ONCE
Status: CANCELLED | OUTPATIENT
Start: 2022-11-02 | End: 2022-11-02

## 2022-11-03 ENCOUNTER — ANESTHESIA EVENT CONVERTED (OUTPATIENT)
Dept: ANESTHESIOLOGY | Facility: HOSPITAL | Age: 67
End: 2022-11-03

## 2022-11-03 ENCOUNTER — ANESTHESIA (OUTPATIENT)
Dept: PERIOP | Facility: HOSPITAL | Age: 67
End: 2022-11-03

## 2022-11-03 ENCOUNTER — APPOINTMENT (OUTPATIENT)
Dept: GENERAL RADIOLOGY | Facility: HOSPITAL | Age: 67
End: 2022-11-03

## 2022-11-03 ENCOUNTER — HOSPITAL ENCOUNTER (OUTPATIENT)
Facility: HOSPITAL | Age: 67
Discharge: HOME | End: 2022-11-04
Attending: ORTHOPAEDIC SURGERY | Admitting: ORTHOPAEDIC SURGERY

## 2022-11-03 DIAGNOSIS — Z96.611 STATUS POST TOTAL REPLACEMENT OF RIGHT SHOULDER: Primary | ICD-10-CM

## 2022-11-03 PROBLEM — J44.9 COPD (CHRONIC OBSTRUCTIVE PULMONARY DISEASE): Status: ACTIVE | Noted: 2022-11-03

## 2022-11-03 PROBLEM — M10.9 GOUT: Status: ACTIVE | Noted: 2022-11-03

## 2022-11-03 PROBLEM — M19.011 GLENOHUMERAL ARTHRITIS, RIGHT: Status: ACTIVE | Noted: 2022-11-03

## 2022-11-03 PROBLEM — J45.909 ASTHMA: Status: ACTIVE | Noted: 2022-11-03

## 2022-11-03 PROBLEM — G47.33 OSA (OBSTRUCTIVE SLEEP APNEA): Status: ACTIVE | Noted: 2022-11-03

## 2022-11-03 PROBLEM — I10 HTN (HYPERTENSION): Status: ACTIVE | Noted: 2022-11-03

## 2022-11-03 PROBLEM — I25.10 CAD (CORONARY ARTERY DISEASE): Status: ACTIVE | Noted: 2022-11-03

## 2022-11-03 LAB
GLUCOSE BLDC GLUCOMTR-MCNC: 108 MG/DL (ref 70–130)
GLUCOSE BLDC GLUCOMTR-MCNC: 111 MG/DL (ref 70–130)

## 2022-11-03 PROCEDURE — 25010000002 ROPIVACAINE PER 1 MG: Performed by: ORTHOPAEDIC SURGERY

## 2022-11-03 PROCEDURE — 82962 GLUCOSE BLOOD TEST: CPT

## 2022-11-03 PROCEDURE — 25010000002 PHENYLEPHRINE 10 MG/ML SOLUTION: Performed by: NURSE ANESTHETIST, CERTIFIED REGISTERED

## 2022-11-03 PROCEDURE — G0378 HOSPITAL OBSERVATION PER HR: HCPCS

## 2022-11-03 PROCEDURE — A9270 NON-COVERED ITEM OR SERVICE: HCPCS | Performed by: INTERNAL MEDICINE

## 2022-11-03 PROCEDURE — 63710000001 TERAZOSIN 5 MG CAPSULE: Performed by: INTERNAL MEDICINE

## 2022-11-03 PROCEDURE — C1713 ANCHOR/SCREW BN/BN,TIS/BN: HCPCS | Performed by: ORTHOPAEDIC SURGERY

## 2022-11-03 PROCEDURE — 94799 UNLISTED PULMONARY SVC/PX: CPT

## 2022-11-03 PROCEDURE — 25010000002 DEXAMETHASONE PER 1 MG: Performed by: NURSE ANESTHETIST, CERTIFIED REGISTERED

## 2022-11-03 PROCEDURE — 25010000002 PROPOFOL 10 MG/ML EMULSION: Performed by: NURSE ANESTHETIST, CERTIFIED REGISTERED

## 2022-11-03 PROCEDURE — 97166 OT EVAL MOD COMPLEX 45 MIN: CPT

## 2022-11-03 PROCEDURE — 25010000002 FENTANYL CITRATE (PF) 50 MCG/ML SOLUTION: Performed by: NURSE ANESTHETIST, CERTIFIED REGISTERED

## 2022-11-03 PROCEDURE — 63710000001 CARVEDILOL 6.25 MG TABLET: Performed by: INTERNAL MEDICINE

## 2022-11-03 PROCEDURE — 97110 THERAPEUTIC EXERCISES: CPT

## 2022-11-03 PROCEDURE — 97535 SELF CARE MNGMENT TRAINING: CPT

## 2022-11-03 PROCEDURE — 73020 X-RAY EXAM OF SHOULDER: CPT

## 2022-11-03 PROCEDURE — A9270 NON-COVERED ITEM OR SERVICE: HCPCS | Performed by: ORTHOPAEDIC SURGERY

## 2022-11-03 PROCEDURE — 63710000001 TRAZODONE 100 MG TABLET: Performed by: INTERNAL MEDICINE

## 2022-11-03 PROCEDURE — 25010000002 ONDANSETRON PER 1 MG

## 2022-11-03 PROCEDURE — 76942 ECHO GUIDE FOR BIOPSY: CPT | Performed by: ORTHOPAEDIC SURGERY

## 2022-11-03 PROCEDURE — 63710000001 ROSUVASTATIN 20 MG TABLET: Performed by: INTERNAL MEDICINE

## 2022-11-03 PROCEDURE — 25010000002 VANCOMYCIN 1 G RECONSTITUTED SOLUTION: Performed by: ORTHOPAEDIC SURGERY

## 2022-11-03 PROCEDURE — 63710000001 OXYCODONE 5 MG TABLET: Performed by: ORTHOPAEDIC SURGERY

## 2022-11-03 PROCEDURE — C1776 JOINT DEVICE (IMPLANTABLE): HCPCS | Performed by: ORTHOPAEDIC SURGERY

## 2022-11-03 PROCEDURE — 25010000002 ONDANSETRON PER 1 MG: Performed by: NURSE ANESTHETIST, CERTIFIED REGISTERED

## 2022-11-03 PROCEDURE — 94660 CPAP INITIATION&MGMT: CPT

## 2022-11-03 PROCEDURE — 25010000002 CEFAZOLIN PER 500 MG: Performed by: ORTHOPAEDIC SURGERY

## 2022-11-03 PROCEDURE — 25010000002 FENTANYL CITRATE (PF) 50 MCG/ML SOLUTION

## 2022-11-03 PROCEDURE — 63710000001 DULOXETINE 60 MG CAPSULE DELAYED-RELEASE PARTICLES: Performed by: INTERNAL MEDICINE

## 2022-11-03 DEVICE — GLEN UNIVERS VAULTLOCK MD: Type: IMPLANTABLE DEVICE | Site: SHOULDER | Status: FUNCTIONAL

## 2022-11-03 DEVICE — ABSORBABLE HEMOSTAT (OXIDIZED REGENERATED CELLULOSE, U.S.P.)
Type: IMPLANTABLE DEVICE | Site: SHOULDER | Status: FUNCTIONAL
Brand: SURGICEL

## 2022-11-03 DEVICE — CMT BONE SIMPLEX/P FULL DOSE 10/PK: Type: IMPLANTABLE DEVICE | Site: SHOULDER | Status: FUNCTIONAL

## 2022-11-03 DEVICE — TOTL ARTH SHLDR S3: Type: IMPLANTABLE DEVICE | Site: SHOULDER | Status: FUNCTIONAL

## 2022-11-03 DEVICE — KT SUT UNIVERS APEX NO2FW: Type: IMPLANTABLE DEVICE | Site: SHOULDER | Status: FUNCTIONAL

## 2022-11-03 DEVICE — STEM HUM/SHLDR UNIVERS APEX 9X60MM: Type: IMPLANTABLE DEVICE | Site: SHOULDER | Status: FUNCTIONAL

## 2022-11-03 RX ORDER — FAMOTIDINE 20 MG/1
20 TABLET, FILM COATED ORAL ONCE
Status: COMPLETED | OUTPATIENT
Start: 2022-11-03 | End: 2022-11-03

## 2022-11-03 RX ORDER — ACETAMINOPHEN 650 MG/1
650 SUPPOSITORY RECTAL EVERY 4 HOURS PRN
Status: DISCONTINUED | OUTPATIENT
Start: 2022-11-03 | End: 2022-11-04 | Stop reason: HOSPADM

## 2022-11-03 RX ORDER — ARIPIPRAZOLE 15 MG/1
7.5 TABLET ORAL DAILY
Status: DISCONTINUED | OUTPATIENT
Start: 2022-11-04 | End: 2022-11-04 | Stop reason: HOSPADM

## 2022-11-03 RX ORDER — MIDAZOLAM HYDROCHLORIDE 1 MG/ML
0.5 INJECTION INTRAMUSCULAR; INTRAVENOUS
Status: DISCONTINUED | OUTPATIENT
Start: 2022-11-03 | End: 2022-11-03 | Stop reason: HOSPADM

## 2022-11-03 RX ORDER — ROSUVASTATIN CALCIUM 20 MG/1
40 TABLET, COATED ORAL NIGHTLY
Status: DISCONTINUED | OUTPATIENT
Start: 2022-11-03 | End: 2022-11-04 | Stop reason: HOSPADM

## 2022-11-03 RX ORDER — TERAZOSIN 5 MG/1
5 CAPSULE ORAL NIGHTLY
Status: DISCONTINUED | OUTPATIENT
Start: 2022-11-03 | End: 2022-11-04 | Stop reason: HOSPADM

## 2022-11-03 RX ORDER — ACETAMINOPHEN 500 MG
1000 TABLET ORAL ONCE
Status: COMPLETED | OUTPATIENT
Start: 2022-11-03 | End: 2022-11-03

## 2022-11-03 RX ORDER — TRANEXAMIC ACID 10 MG/ML
1000 INJECTION, SOLUTION INTRAVENOUS ONCE
Status: COMPLETED | OUTPATIENT
Start: 2022-11-03 | End: 2022-11-03

## 2022-11-03 RX ORDER — EPHEDRINE SULFATE 50 MG/ML
INJECTION INTRAVENOUS AS NEEDED
Status: DISCONTINUED | OUTPATIENT
Start: 2022-11-03 | End: 2022-11-03 | Stop reason: SURG

## 2022-11-03 RX ORDER — TRAZODONE HYDROCHLORIDE 100 MG/1
200 TABLET ORAL NIGHTLY
Status: DISCONTINUED | OUTPATIENT
Start: 2022-11-03 | End: 2022-11-04 | Stop reason: HOSPADM

## 2022-11-03 RX ORDER — FENTANYL CITRATE 50 UG/ML
50 INJECTION, SOLUTION INTRAMUSCULAR; INTRAVENOUS
Status: DISCONTINUED | OUTPATIENT
Start: 2022-11-03 | End: 2022-11-03 | Stop reason: HOSPADM

## 2022-11-03 RX ORDER — ONDANSETRON 4 MG/1
4 TABLET, FILM COATED ORAL EVERY 6 HOURS PRN
Status: DISCONTINUED | OUTPATIENT
Start: 2022-11-03 | End: 2022-11-04 | Stop reason: HOSPADM

## 2022-11-03 RX ORDER — PREGABALIN 75 MG/1
75 CAPSULE ORAL ONCE
Status: COMPLETED | OUTPATIENT
Start: 2022-11-03 | End: 2022-11-03

## 2022-11-03 RX ORDER — LISINOPRIL 5 MG/1
5 TABLET ORAL NIGHTLY
Status: DISCONTINUED | OUTPATIENT
Start: 2022-11-04 | End: 2022-11-04 | Stop reason: HOSPADM

## 2022-11-03 RX ORDER — IPRATROPIUM BROMIDE AND ALBUTEROL SULFATE 2.5; .5 MG/3ML; MG/3ML
3 SOLUTION RESPIRATORY (INHALATION) EVERY 4 HOURS PRN
Status: DISCONTINUED | OUTPATIENT
Start: 2022-11-03 | End: 2022-11-04 | Stop reason: HOSPADM

## 2022-11-03 RX ORDER — SODIUM CHLORIDE 0.9 % (FLUSH) 0.9 %
10 SYRINGE (ML) INJECTION EVERY 12 HOURS SCHEDULED
Status: DISCONTINUED | OUTPATIENT
Start: 2022-11-03 | End: 2022-11-03 | Stop reason: HOSPADM

## 2022-11-03 RX ORDER — ONDANSETRON 2 MG/ML
4 INJECTION INTRAMUSCULAR; INTRAVENOUS EVERY 6 HOURS PRN
Status: DISCONTINUED | OUTPATIENT
Start: 2022-11-03 | End: 2022-11-04 | Stop reason: HOSPADM

## 2022-11-03 RX ORDER — MAGNESIUM HYDROXIDE 1200 MG/15ML
LIQUID ORAL AS NEEDED
Status: DISCONTINUED | OUTPATIENT
Start: 2022-11-03 | End: 2022-11-03 | Stop reason: HOSPADM

## 2022-11-03 RX ORDER — FENTANYL CITRATE 50 UG/ML
INJECTION, SOLUTION INTRAMUSCULAR; INTRAVENOUS
Status: COMPLETED
Start: 2022-11-03 | End: 2022-11-03

## 2022-11-03 RX ORDER — CARVEDILOL 6.25 MG/1
6.25 TABLET ORAL 2 TIMES DAILY
Status: DISCONTINUED | OUTPATIENT
Start: 2022-11-03 | End: 2022-11-04 | Stop reason: HOSPADM

## 2022-11-03 RX ORDER — ACETAMINOPHEN 650 MG
TABLET, EXTENDED RELEASE ORAL AS NEEDED
Status: DISCONTINUED | OUTPATIENT
Start: 2022-11-03 | End: 2022-11-03 | Stop reason: HOSPADM

## 2022-11-03 RX ORDER — DULOXETIN HYDROCHLORIDE 60 MG/1
60 CAPSULE, DELAYED RELEASE ORAL NIGHTLY
Status: DISCONTINUED | OUTPATIENT
Start: 2022-11-03 | End: 2022-11-04 | Stop reason: HOSPADM

## 2022-11-03 RX ORDER — ALLOPURINOL 300 MG/1
450 TABLET ORAL DAILY
Status: DISCONTINUED | OUTPATIENT
Start: 2022-11-04 | End: 2022-11-04 | Stop reason: HOSPADM

## 2022-11-03 RX ORDER — ONDANSETRON 2 MG/ML
INJECTION INTRAMUSCULAR; INTRAVENOUS AS NEEDED
Status: DISCONTINUED | OUTPATIENT
Start: 2022-11-03 | End: 2022-11-03 | Stop reason: SURG

## 2022-11-03 RX ORDER — TRANEXAMIC ACID 10 MG/ML
1000 INJECTION, SOLUTION INTRAVENOUS ONCE
Status: CANCELLED | OUTPATIENT
Start: 2022-11-03 | End: 2022-11-03

## 2022-11-03 RX ORDER — LIDOCAINE HYDROCHLORIDE 10 MG/ML
INJECTION, SOLUTION EPIDURAL; INFILTRATION; INTRACAUDAL; PERINEURAL AS NEEDED
Status: DISCONTINUED | OUTPATIENT
Start: 2022-11-03 | End: 2022-11-03 | Stop reason: SURG

## 2022-11-03 RX ORDER — DEXAMETHASONE SODIUM PHOSPHATE 4 MG/ML
INJECTION, SOLUTION INTRA-ARTICULAR; INTRALESIONAL; INTRAMUSCULAR; INTRAVENOUS; SOFT TISSUE AS NEEDED
Status: DISCONTINUED | OUTPATIENT
Start: 2022-11-03 | End: 2022-11-03 | Stop reason: SURG

## 2022-11-03 RX ORDER — CEFAZOLIN SODIUM IN 0.9 % NACL 2 G/100 ML
2 PLASTIC BAG, INJECTION (ML) INTRAVENOUS ONCE
Status: COMPLETED | OUTPATIENT
Start: 2022-11-03 | End: 2022-11-03

## 2022-11-03 RX ORDER — OXYCODONE HYDROCHLORIDE 5 MG/1
10 TABLET ORAL EVERY 4 HOURS PRN
Status: DISCONTINUED | OUTPATIENT
Start: 2022-11-03 | End: 2022-11-04 | Stop reason: HOSPADM

## 2022-11-03 RX ORDER — DULOXETIN HYDROCHLORIDE 30 MG/1
30 CAPSULE, DELAYED RELEASE ORAL DAILY
Status: DISCONTINUED | OUTPATIENT
Start: 2022-11-04 | End: 2022-11-04 | Stop reason: HOSPADM

## 2022-11-03 RX ORDER — LABETALOL HYDROCHLORIDE 5 MG/ML
10 INJECTION, SOLUTION INTRAVENOUS EVERY 4 HOURS PRN
Status: DISCONTINUED | OUTPATIENT
Start: 2022-11-03 | End: 2022-11-04 | Stop reason: HOSPADM

## 2022-11-03 RX ORDER — FUROSEMIDE 40 MG/1
40 TABLET ORAL DAILY
Status: DISCONTINUED | OUTPATIENT
Start: 2022-11-04 | End: 2022-11-04 | Stop reason: HOSPADM

## 2022-11-03 RX ORDER — SODIUM CHLORIDE, SODIUM LACTATE, POTASSIUM CHLORIDE, CALCIUM CHLORIDE 600; 310; 30; 20 MG/100ML; MG/100ML; MG/100ML; MG/100ML
9 INJECTION, SOLUTION INTRAVENOUS CONTINUOUS
Status: DISCONTINUED | OUTPATIENT
Start: 2022-11-03 | End: 2022-11-03

## 2022-11-03 RX ORDER — FENTANYL CITRATE 50 UG/ML
INJECTION, SOLUTION INTRAMUSCULAR; INTRAVENOUS AS NEEDED
Status: DISCONTINUED | OUTPATIENT
Start: 2022-11-03 | End: 2022-11-03 | Stop reason: SURG

## 2022-11-03 RX ORDER — LIDOCAINE HYDROCHLORIDE 10 MG/ML
0.5 INJECTION, SOLUTION EPIDURAL; INFILTRATION; INTRACAUDAL; PERINEURAL ONCE AS NEEDED
Status: COMPLETED | OUTPATIENT
Start: 2022-11-03 | End: 2022-11-03

## 2022-11-03 RX ORDER — ROCURONIUM BROMIDE 10 MG/ML
INJECTION, SOLUTION INTRAVENOUS AS NEEDED
Status: DISCONTINUED | OUTPATIENT
Start: 2022-11-03 | End: 2022-11-03 | Stop reason: SURG

## 2022-11-03 RX ORDER — PANTOPRAZOLE SODIUM 40 MG/1
40 TABLET, DELAYED RELEASE ORAL EVERY MORNING
Status: DISCONTINUED | OUTPATIENT
Start: 2022-11-04 | End: 2022-11-04 | Stop reason: HOSPADM

## 2022-11-03 RX ORDER — IPRATROPIUM BROMIDE AND ALBUTEROL SULFATE 2.5; .5 MG/3ML; MG/3ML
3 SOLUTION RESPIRATORY (INHALATION)
Status: DISCONTINUED | OUTPATIENT
Start: 2022-11-03 | End: 2022-11-03

## 2022-11-03 RX ORDER — HYDROMORPHONE HYDROCHLORIDE 1 MG/ML
0.5 INJECTION, SOLUTION INTRAMUSCULAR; INTRAVENOUS; SUBCUTANEOUS
Status: DISCONTINUED | OUTPATIENT
Start: 2022-11-03 | End: 2022-11-04 | Stop reason: HOSPADM

## 2022-11-03 RX ORDER — NALOXONE HCL 0.4 MG/ML
0.1 VIAL (ML) INJECTION
Status: DISCONTINUED | OUTPATIENT
Start: 2022-11-03 | End: 2022-11-04 | Stop reason: HOSPADM

## 2022-11-03 RX ORDER — GLYCOPYRROLATE 0.2 MG/ML
INJECTION INTRAMUSCULAR; INTRAVENOUS AS NEEDED
Status: DISCONTINUED | OUTPATIENT
Start: 2022-11-03 | End: 2022-11-03 | Stop reason: SURG

## 2022-11-03 RX ORDER — ACETAMINOPHEN 325 MG/1
650 TABLET ORAL EVERY 4 HOURS PRN
Status: DISCONTINUED | OUTPATIENT
Start: 2022-11-03 | End: 2022-11-04 | Stop reason: HOSPADM

## 2022-11-03 RX ORDER — DOCUSATE SODIUM 100 MG/1
100 CAPSULE, LIQUID FILLED ORAL 2 TIMES DAILY PRN
Status: DISCONTINUED | OUTPATIENT
Start: 2022-11-03 | End: 2022-11-04 | Stop reason: HOSPADM

## 2022-11-03 RX ORDER — HYDROMORPHONE HYDROCHLORIDE 1 MG/ML
0.5 INJECTION, SOLUTION INTRAMUSCULAR; INTRAVENOUS; SUBCUTANEOUS
Status: DISCONTINUED | OUTPATIENT
Start: 2022-11-03 | End: 2022-11-03 | Stop reason: HOSPADM

## 2022-11-03 RX ORDER — TRANEXAMIC ACID 10 MG/ML
1000 INJECTION, SOLUTION INTRAVENOUS ONCE
Status: DISCONTINUED | OUTPATIENT
Start: 2022-11-03 | End: 2022-11-03 | Stop reason: HOSPADM

## 2022-11-03 RX ORDER — BUPIVACAINE HCL/0.9 % NACL/PF 0.1 %
2 PLASTIC BAG, INJECTION (ML) EPIDURAL EVERY 8 HOURS
Status: COMPLETED | OUTPATIENT
Start: 2022-11-03 | End: 2022-11-04

## 2022-11-03 RX ORDER — CEFAZOLIN SODIUM 2 G/100ML
2 INJECTION, SOLUTION INTRAVENOUS EVERY 8 HOURS
Status: DISCONTINUED | OUTPATIENT
Start: 2022-11-03 | End: 2022-11-03

## 2022-11-03 RX ORDER — SODIUM CHLORIDE 450 MG/100ML
50 INJECTION, SOLUTION INTRAVENOUS CONTINUOUS
Status: DISCONTINUED | OUTPATIENT
Start: 2022-11-03 | End: 2022-11-04 | Stop reason: HOSPADM

## 2022-11-03 RX ORDER — ONDANSETRON 2 MG/ML
INJECTION INTRAMUSCULAR; INTRAVENOUS
Status: COMPLETED
Start: 2022-11-03 | End: 2022-11-03

## 2022-11-03 RX ORDER — CEFAZOLIN SODIUM 2 G/100ML
2 INJECTION, SOLUTION INTRAVENOUS ONCE
Status: DISCONTINUED | OUTPATIENT
Start: 2022-11-03 | End: 2022-11-03

## 2022-11-03 RX ORDER — OXYCODONE HYDROCHLORIDE 5 MG/1
5 TABLET ORAL EVERY 4 HOURS PRN
Status: DISCONTINUED | OUTPATIENT
Start: 2022-11-03 | End: 2022-11-04 | Stop reason: HOSPADM

## 2022-11-03 RX ORDER — BUPIVACAINE HYDROCHLORIDE 2.5 MG/ML
INJECTION, SOLUTION EPIDURAL; INFILTRATION; INTRACAUDAL
Status: COMPLETED | OUTPATIENT
Start: 2022-11-03 | End: 2022-11-03

## 2022-11-03 RX ORDER — PHENYLEPHRINE HYDROCHLORIDE 10 MG/ML
INJECTION INTRAVENOUS AS NEEDED
Status: DISCONTINUED | OUTPATIENT
Start: 2022-11-03 | End: 2022-11-03 | Stop reason: SURG

## 2022-11-03 RX ORDER — SODIUM CHLORIDE 0.9 % (FLUSH) 0.9 %
10 SYRINGE (ML) INJECTION AS NEEDED
Status: DISCONTINUED | OUTPATIENT
Start: 2022-11-03 | End: 2022-11-03 | Stop reason: HOSPADM

## 2022-11-03 RX ORDER — ASPIRIN 81 MG/1
81 TABLET ORAL DAILY
Status: DISCONTINUED | OUTPATIENT
Start: 2022-11-04 | End: 2022-11-04 | Stop reason: HOSPADM

## 2022-11-03 RX ORDER — VANCOMYCIN HYDROCHLORIDE 1 G/20ML
INJECTION, POWDER, LYOPHILIZED, FOR SOLUTION INTRAVENOUS AS NEEDED
Status: DISCONTINUED | OUTPATIENT
Start: 2022-11-03 | End: 2022-11-03 | Stop reason: HOSPADM

## 2022-11-03 RX ORDER — BUPIVACAINE HCL/0.9 % NACL/PF 0.1 %
2 PLASTIC BAG, INJECTION (ML) EPIDURAL EVERY 8 HOURS
Status: DISCONTINUED | OUTPATIENT
Start: 2022-11-03 | End: 2022-11-03

## 2022-11-03 RX ORDER — PROPOFOL 10 MG/ML
VIAL (ML) INTRAVENOUS AS NEEDED
Status: DISCONTINUED | OUTPATIENT
Start: 2022-11-03 | End: 2022-11-03 | Stop reason: SURG

## 2022-11-03 RX ORDER — ROPIVACAINE HYDROCHLORIDE 2 MG/ML
INJECTION, SOLUTION EPIDURAL; INFILTRATION; PERINEURAL CONTINUOUS
Status: DISCONTINUED | OUTPATIENT
Start: 2022-11-03 | End: 2022-11-04 | Stop reason: HOSPADM

## 2022-11-03 RX ADMIN — CARVEDILOL 6.25 MG: 6.25 TABLET, FILM COATED ORAL at 20:42

## 2022-11-03 RX ADMIN — EPHEDRINE SULFATE 15 MG: 50 INJECTION INTRAVENOUS at 09:12

## 2022-11-03 RX ADMIN — PHENYLEPHRINE HYDROCHLORIDE 100 MCG: 10 INJECTION INTRAVENOUS at 07:53

## 2022-11-03 RX ADMIN — ROSUVASTATIN 40 MG: 20 TABLET, FILM COATED ORAL at 20:41

## 2022-11-03 RX ADMIN — TRANEXAMIC ACID 1000 MG: 10 INJECTION, SOLUTION INTRAVENOUS at 09:04

## 2022-11-03 RX ADMIN — ROCURONIUM BROMIDE 50 MG: 10 INJECTION, SOLUTION INTRAVENOUS at 07:49

## 2022-11-03 RX ADMIN — SODIUM CHLORIDE 50 ML/HR: 4.5 INJECTION, SOLUTION INTRAVENOUS at 16:40

## 2022-11-03 RX ADMIN — OXYCODONE 10 MG: 5 TABLET ORAL at 17:46

## 2022-11-03 RX ADMIN — PHENYLEPHRINE HYDROCHLORIDE 100 MCG: 10 INJECTION INTRAVENOUS at 08:04

## 2022-11-03 RX ADMIN — EPHEDRINE SULFATE 25 MG: 50 INJECTION INTRAVENOUS at 08:04

## 2022-11-03 RX ADMIN — EPHEDRINE SULFATE 15 MG: 50 INJECTION INTRAVENOUS at 07:52

## 2022-11-03 RX ADMIN — PHENYLEPHRINE HYDROCHLORIDE 200 MCG: 10 INJECTION INTRAVENOUS at 09:12

## 2022-11-03 RX ADMIN — PHENYLEPHRINE HYDROCHLORIDE 200 MCG: 10 INJECTION INTRAVENOUS at 08:00

## 2022-11-03 RX ADMIN — SODIUM CHLORIDE 50 ML/HR: 4.5 INJECTION, SOLUTION INTRAVENOUS at 11:14

## 2022-11-03 RX ADMIN — DULOXETINE HYDROCHLORIDE 60 MG: 60 CAPSULE, DELAYED RELEASE ORAL at 20:41

## 2022-11-03 RX ADMIN — LIDOCAINE HYDROCHLORIDE 50 MG: 10 INJECTION, SOLUTION EPIDURAL; INFILTRATION; INTRACAUDAL; PERINEURAL at 07:49

## 2022-11-03 RX ADMIN — Medication 1000 MG: at 10:41

## 2022-11-03 RX ADMIN — EPHEDRINE SULFATE 15 MG: 50 INJECTION INTRAVENOUS at 09:02

## 2022-11-03 RX ADMIN — DEXAMETHASONE SODIUM PHOSPHATE 8 MG: 4 INJECTION, SOLUTION INTRA-ARTICULAR; INTRALESIONAL; INTRAMUSCULAR; INTRAVENOUS; SOFT TISSUE at 07:55

## 2022-11-03 RX ADMIN — EPHEDRINE SULFATE 10 MG: 50 INJECTION INTRAVENOUS at 07:54

## 2022-11-03 RX ADMIN — LIDOCAINE HYDROCHLORIDE 0.5 ML: 10 INJECTION, SOLUTION EPIDURAL; INFILTRATION; INTRACAUDAL; PERINEURAL at 06:42

## 2022-11-03 RX ADMIN — PHENYLEPHRINE HYDROCHLORIDE 100 MCG: 10 INJECTION INTRAVENOUS at 08:18

## 2022-11-03 RX ADMIN — FENTANYL CITRATE 50 MCG: 50 INJECTION, SOLUTION INTRAMUSCULAR; INTRAVENOUS at 13:05

## 2022-11-03 RX ADMIN — ONDANSETRON 4 MG: 2 INJECTION INTRAMUSCULAR; INTRAVENOUS at 12:45

## 2022-11-03 RX ADMIN — TRAZODONE HYDROCHLORIDE 200 MG: 100 TABLET ORAL at 20:42

## 2022-11-03 RX ADMIN — ONDANSETRON 4 MG: 2 INJECTION INTRAMUSCULAR; INTRAVENOUS at 09:15

## 2022-11-03 RX ADMIN — PHENYLEPHRINE HYDROCHLORIDE 200 MCG: 10 INJECTION INTRAVENOUS at 08:25

## 2022-11-03 RX ADMIN — FENTANYL CITRATE 100 MCG: 50 INJECTION, SOLUTION INTRAMUSCULAR; INTRAVENOUS at 06:48

## 2022-11-03 RX ADMIN — EPHEDRINE SULFATE 10 MG: 50 INJECTION INTRAVENOUS at 07:59

## 2022-11-03 RX ADMIN — TRANEXAMIC ACID 1000 MG: 10 INJECTION, SOLUTION INTRAVENOUS at 07:51

## 2022-11-03 RX ADMIN — FAMOTIDINE 20 MG: 20 TABLET ORAL at 06:42

## 2022-11-03 RX ADMIN — PHENYLEPHRINE HYDROCHLORIDE 100 MCG: 10 INJECTION INTRAVENOUS at 09:02

## 2022-11-03 RX ADMIN — PHENYLEPHRINE HYDROCHLORIDE 100 MCG: 10 INJECTION INTRAVENOUS at 07:56

## 2022-11-03 RX ADMIN — PROPOFOL 200 MG: 10 INJECTION, EMULSION INTRAVENOUS at 07:49

## 2022-11-03 RX ADMIN — CEFAZOLIN 2 G: 10 INJECTION, POWDER, FOR SOLUTION INTRAVENOUS at 07:47

## 2022-11-03 RX ADMIN — PREGABALIN 75 MG: 75 CAPSULE ORAL at 06:42

## 2022-11-03 RX ADMIN — SUGAMMADEX 344 MG: 100 INJECTION, SOLUTION INTRAVENOUS at 09:53

## 2022-11-03 RX ADMIN — TERAZOSIN HYDROCHLORIDE 5 MG: 5 CAPSULE ORAL at 20:41

## 2022-11-03 RX ADMIN — EPHEDRINE SULFATE 15 MG: 50 INJECTION INTRAVENOUS at 08:46

## 2022-11-03 RX ADMIN — CEFAZOLIN 2 G: 10 INJECTION, POWDER, FOR SOLUTION INTRAVENOUS at 15:35

## 2022-11-03 RX ADMIN — BUPIVACAINE HYDROCHLORIDE 15 ML: 2.5 INJECTION, SOLUTION EPIDURAL; INFILTRATION; INTRACAUDAL at 06:45

## 2022-11-03 RX ADMIN — GLYCOPYRROLATE 0.2 MG: 0.2 INJECTION INTRAMUSCULAR; INTRAVENOUS at 08:04

## 2022-11-03 RX ADMIN — SODIUM CHLORIDE, POTASSIUM CHLORIDE, SODIUM LACTATE AND CALCIUM CHLORIDE 9 ML/HR: 600; 310; 30; 20 INJECTION, SOLUTION INTRAVENOUS at 06:42

## 2022-11-03 RX ADMIN — PHENYLEPHRINE HYDROCHLORIDE 200 MCG: 10 INJECTION INTRAVENOUS at 08:46

## 2022-11-03 RX ADMIN — GLYCOPYRROLATE 0.2 MG: 0.2 INJECTION INTRAMUSCULAR; INTRAVENOUS at 08:31

## 2022-11-03 RX ADMIN — ACETAMINOPHEN 1000 MG: 500 TABLET ORAL at 06:42

## 2022-11-03 NOTE — H&P
Patient Name: Shayne Neal  MRN: [id number]  : 1955  DOS: 11/3/2022    Attending: Jose Raul Yin MD    Primary Care Provider: Iveth Pierce MD      Chief complaint: Right shoulder pain    Subjective   Patient is a pleasant 67 y.o. male presented for scheduled surgery by Dr. Yin.    He underwent right total shoulder arthro-under GA and a block, tolerated surgery well, was admitted for further management.    Seen in his room postoperatively, doing fairly well, complains of some degree of postoperative pain, no nausea, vomiting, or shortness of breath.     Reviewed with patient his extensive past medical history including that of coronary artery disease with remote CABG and subsequent multiple stents, no recent angina or anginal equivalent.    Other history includes gouty arthritis, depression anxiety, GERD, among other problems.  He does have chronic kidney disease and COPD as well.     Allergies   Allergen Reactions   • Erythromycin Other (See Comments)     Redness          Medications Prior to Admission   Medication Sig Dispense Refill Last Dose   • allopurinol (ZYLOPRIM) 300 MG tablet Take 1.5 tablets by mouth Daily. 1 1/2   11/3/2022 at 0430   • ARIPiprazole (ABILIFY) 15 MG tablet Take 7.5 mg by mouth Daily.   11/3/2022 at 0430   • aspirin 81 MG EC tablet Take 1 tablet by mouth Daily.   11/3/2022 at 0430   • carvedilol (COREG) 12.5 MG tablet Take 6.25 mg by mouth 2 (Two) Times a Day.   11/3/2022 at 0430   • cyclobenzaprine (FLEXERIL) 10 MG tablet Take 10 mg by mouth 3 (Three) Times a Day As Needed for Muscle Spasms.   2022 at 1300   • DULoxetine (CYMBALTA) 30 MG capsule Take 1 capsule by mouth Every Morning.   11/3/2022 at 0430   • DULoxetine (CYMBALTA) 30 MG capsule Take 2 capsules by mouth Every Night.   2022 at 2100   • furosemide (LASIX) 40 MG tablet Take 1 tablet by mouth Daily.   2022 at 1300   • furosemide (LASIX) 40 MG tablet Take 1 tablet by mouth Daily As  Needed (swelling in feet and ankles if not decreased by taking am dose of 40mg Lasix).   11/2/2022 at 1300   • isosorbide dinitrate (ISORDIL) 30 MG tablet Take 1 tablet by mouth Daily.   11/3/2022 at 0430   • omeprazole (priLOSEC) 20 MG capsule Take 1 capsule by mouth Daily.   11/3/2022 at 0430   • prazosin (MINIPRESS) 2 MG capsule Take 4 capsules by mouth Every Night.   11/2/2022 at 2100   • rosuvastatin (CRESTOR) 40 MG tablet Take 40 mg by mouth Daily.   11/2/2022 at 2100   • traZODone (DESYREL) 100 MG tablet Take 2.5 tablets by mouth Every Night.   11/2/2022 at 2100   • benzoyl peroxide 5 % external wash use as directed by Dr. Yin 148 g 0    • clopidogrel (PLAVIX) 75 MG tablet Take 75 mg by mouth Daily.   10/26/2022   • lisinopril (PRINIVIL,ZESTRIL) 5 MG tablet Take 1 tablet by mouth Every Night.   11/1/2022 at 2100   • meloxicam (MOBIC) 15 MG tablet Take 1 tablet by mouth Daily As Needed for Moderate Pain.   10/26/2022   • nitroglycerin (NITROSTAT) 0.4 MG SL tablet 1 under the tongue as needed for angina, may repeat q5mins for up three doses (Patient taking differently: 1 tablet Every 5 (Five) Minutes As Needed for Chest Pain. 1 under the tongue as needed for angina, may repeat q5mins for up three doses) 25 tablet 2 Unknown   • ondansetron (ZOFRAN) 4 MG tablet Take 1 tablet by mouth Every 8 (Eight) Hours As Needed for nausea 30 tablet 0 Unknown          Past Medical History:   Diagnosis Date   • Asthma    • Chronic kidney disease    • Colon cancer (HCC)     colon cancer   • COPD (chronic obstructive pulmonary disease) (HCC)    • Coronary artery disease    • Diabetes mellitus (HCC)     diet controlled   • Gout    • Heart attack (HCC)    • History of transfusion     after knee surgery and with chemo, no reaction   • Hyperlipidemia    • Hypertension    • Neuropathy    • Sleep apnea     wears cpap with 2 liters at night   • Stroke (HCC)     , left sided weakness and periodic muscle jerking     Past  "Surgical History:   Procedure Laterality Date   • CARDIAC CATHETERIZATION      LCR by Dr. Pastor / [facility]    • CATARACT EXTRACTION Bilateral    • CHOLECYSTECTOMY     • COLON RESECTION     • COLONOSCOPY     • CORONARY ARTERY BYPASS GRAFT      [facility]    • CORONARY STENT PLACEMENT     • INCISIONAL HERNIA REPAIR      abdomen   • TOTAL KNEE ARTHROPLASTY Left      Family History   Problem Relation Age of Onset   • Hypertension Mother    • Heart disease Mother    • Diabetes Mother    • Hyperlipidemia Mother    • Hypertension Father    • Heart disease Father    • Hyperlipidemia Father      Social History     Tobacco Use   • Smoking status: Former     Packs/day: 2.00     Years: 40.00     Pack years: 80.00     Types: Cigarettes     Quit date:      Years since quittin.8   • Smokeless tobacco: Never   Vaping Use   • Vaping Use: Never used   Substance Use Topics   • Alcohol use: Not Currently     Comment:    • Drug use: Not Currently     Types: Marijuana     Comment: last use        Review of Systems  Pertinent items are noted in HPI    Vital Signs  /92 (BP Location: Left arm, Patient Position: Sitting)   Pulse 94   Temp 98.3 °F (36.8 °C) (Temporal)   Resp 16   Ht 177.8 cm (70\")   Wt 115 kg (253 lb 1.4 oz)   SpO2 97%   BMI 36.31 kg/m²     Physical Exam:    General Appearance:    Alert, cooperative, in no acute distress   Head:    Normocephalic, without obvious abnormality, atraumatic   Eyes:            Lids and lashes normal, conjunctivae and sclerae normal, no   icterus, no pallor, corneas clear,    Ears:    Ears appear intact with no abnormalities noted   Throat:   No oral lesions, no thrush, oral mucosa moist   Neck:   No adenopathy, supple, trachea midline, no thyromegaly         Lungs:     Clear to auscultation,respirations regular, even and                   unlabored    Heart:    Regular rhythm and normal rate, normal S1 and S2, no      " murmur    Abdomen:     Normal bowel sounds, no masses, no organomegaly, soft        non-tender, non-distended, no guarding, no rebound                 tenderness   Genitalia:    Deferred   Extremities:  Right UE in a sling, CDI Aquacel dressing shoulder. Interscalene nerve block cath present.  Distal pulses, cap refill, movements of fingers, wrist, intact.     Pulses:   Pulses palpable and equal bilaterally   Skin:   No bleeding, bruising or rash   Neurologic:   Cranial nerves 2 - 12 grossly intact      I reviewed the patient's new clinical results.       Results from last 7 days   Lab Units 11/01/22  1456   WBC 10*3/mm3 7.03   HEMOGLOBIN g/dL 15.7   HEMATOCRIT % 45.3   PLATELETS 10*3/mm3 141     Results from last 7 days   Lab Units 11/01/22  1456   SODIUM mmol/L 142   POTASSIUM mmol/L 4.7   CHLORIDE mmol/L 104   CO2 mmol/L 31.0*   BUN mg/dL 17   CREATININE mg/dL 1.25   CALCIUM mg/dL 9.2   BILIRUBIN mg/dL 0.4   ALK PHOS U/L 57   ALT (SGPT) U/L 21   AST (SGOT) U/L 16   GLUCOSE mg/dL 91     Lab Results   Component Value Date    HGBA1C 5.50 11/01/2022           Assessment and Plan:       Status post total replacement of right shoulder    Glenohumeral arthritis, right    Asthma    COPD (chronic obstructive pulmonary disease) (HCC)    Gout    CAD (coronary artery disease)    HTN (hypertension)    NYDIA (obstructive sleep apnea)      Plan  Postop management to include  1. PT/OT. NWB, right UE, ROM hand, wrist, elbow.  2. Pain control-prns, interscalene nerve block cath with ropivacaine infusion.   3. IS-encourage  4. DVT proph- Mech/ mobilize.  5. Bowel regimen  6. Resume home medications as appropriate  7. Monitor post-op labs  8. DC planning for home.    -Gout: Resume allopurinol.    -CAD: Maintained on home regimen, beta-blocker, Imdur, statin, antihypertensives, will resume aspirin on postop day 1 and Plavix as well if okay with surgery.    -NYDIA: CPAP, nocturnal oxygen.    -COPD: Bronchodilators as needed, monitor  oxygenation.      Discussed with patient and his wife.      Nomi disclaimer:  Part of this encounter note is an electronic transcription/translation of spoken language to printed text. The electronic translation of spoken language may permit erroneous, or at times, nonsensical words or phrases to be inadvertently transcribed; Although I have reviewed the note for such errors, some may still exist.    John Alonso MD  11/03/22  14:34 EDT

## 2022-11-03 NOTE — OP NOTE
Operative Report Total Shoulder Replacement    Preoperative Diagnosis: right  shoulder degenerative arthritis    Postoperative Diagnosis: Same    Procedure: right Total shoulder arthroplasty    Surgeon: Dr. Jose Raul Yin         Assistant: Nenita Dia PA  ** Please note the physician assistant was medically necessary to assist with positioning retraction, arm positioning, care of soft tissues and closure    EBL:    150 cc    Anesthesia: GETA with interscalene brachial plexus block    Implants:  Arthrex Total Shoulder Arthroplasty System  1) Humeral Stem:  Size 9  2) Glenoid:  Size medium  , cemented, pegged all poly  3) Head:  Offset humeral head, size 50  mm x  19 mm       Indications: patient   is a 68yo  male with right  shoulder degenerative arthritis.  Risks and benefits of operative versus nonoperative management discussed.  Patient elected to proceed with surgery. Informed consent obtained.       Description: Patient was met in the preoperative holding area and confirmed by name, medical record number, .  The operative site was correctly identified. Patient was then met by anesthesia and cleared for surgery.  An interscalene brachial plexus block was then performed.  Patient was then brought to the operating room suite and and placed supine on the OR table.  Patient underwent smooth induction with GETA.  Patient then positioned in the beach chair position. Patient’s right shoulder and right upper extremity prepped and draped in sterile fashion. Preoperative prophylactic antibiotic given with 2 g of Ancef.  A timeout was then observed    An approx 8cm skin incision was made centered over the deltopectoral interval.  Dissection carried down to the interval taking the cephalic vein laterally.  Deep retractors were then placed. The biceps tendon was then localized and followed through the rotator interval back to the glenoid and tenotomized.  A subscapularis peel was performed and the shoulder was readily  dislocated.      The proximal humerus was cut in a free hand fashion in approximately 30 degrees of retroversion and the appropriate depth of resection and inclination. We then reamed the humerus distally to accommodate a humeral stem trial. We then used the countersink reamed and then placed an appropriate humeral stem trial.  A humeral head trial was placed and rotated to the appropriate position.        The axillary nerve was then palpated and deep retractors were placed exposing the glenoid. Careful releases of the anterior and inferior capsule were then performed further exposing the glenoid.  The  glenoid was then exposed, the glenoid was reamed appropriately and the  peg holes drilled.  Cement was then mixed, and placed in a pressurized fashion into the peg holes.  An all poly glenoid implant was applied and impacted into position.       We drill holes in the biceps groove to aid in repair of our subscapularis peel    The shoulder was then redislocated and a size 9 humeral stem was placed.  An offset humeral head was applied and impacted into position.      We then passed our stitches through the subscapularis and tied down our tendon.      We then irrigated with sterile saline. We then closed the deltopectoral layer with 0-vicryl, the dermal layer with 2-0 vicryl and the skin with staples.  Vancomycin powder was placed in the wound.  A sterile dressing was applied.      Patient tolerated the procedure well.  They were extubated and placed in a sling.  At the end of the case all sponge and instrument counts were correct x 2.      Plan: Patient will be admitted for observation and pain control.  Antibiotics x 24 hours.   Patient will be seen back in the clinic in approx 10-14 days.

## 2022-11-03 NOTE — PLAN OF CARE
Goal Outcome Evaluation:  Plan of Care Reviewed With: patient, spouse        Progress: no change (Initial Eval)  Outcome Evaluation: OT initial eval completed. Pt presenting s/p R TSA. OT educated pt and spouse on R shoulder precautions, sling management, hemidressing technique, R axilla hygiene, HEP w/in surgeon parameters and management of nerve cath to prevent accidental dislodgement. Pt requiring 2 L of O2 via NC to maintain > 90%. Demo good effort w/ HEP w/in surgeon parameters completing AROM elbow/wrist/hand . MaxA for doffing/donning sling w/ spouse observing throughout. MaxA for LB dressing. Pt would benefit from PT eval d/t recent fall. Recommend home w/ assist when medically appropriate.

## 2022-11-03 NOTE — ANESTHESIA PROCEDURE NOTES
Airway  Urgency: elective    Date/Time: 11/3/2022 7:50 AM  Airway not difficult    General Information and Staff    Patient location during procedure: OR  CRNA/CAA: Juan Jose Hudson CRNA    Indications and Patient Condition  Indications for airway management: airway protection    Preoxygenated: yes  MILS not maintained throughout  Mask difficulty assessment: 1 - vent by mask    Final Airway Details  Final airway type: endotracheal airway      Successful airway: ETT  Cuffed: yes   Successful intubation technique: direct laryngoscopy  Facilitating devices/methods: intubating stylet  Endotracheal tube insertion site: oral  Blade: Bryce  Blade size: 4  ETT size (mm): 7.5  Cormack-Lehane Classification: grade I - full view of glottis  Placement verified by: chest auscultation and capnometry   Measured from: lips  ETT/EBT  to lips (cm): 20  Number of attempts at approach: 1  Assessment: lips, teeth, and gum same as pre-op and atraumatic intubation    Additional Comments  Negative epigastric sounds, Breath sound equal bilaterally with symmetric chest rise and fall

## 2022-11-03 NOTE — PLAN OF CARE
Goal Outcome Evaluation:  Plan of Care Reviewed With: patient        Progress: no change  Outcome Evaluation: Patient post op today. VSS. Pt on 2L NC. Patient uses CPAP machine to sleep with. Infublock in place. PRN medication provided for pain management. Patient has voided and is able to tolerate eating and drinking without difficulty.

## 2022-11-03 NOTE — ANESTHESIA PROCEDURE NOTES
RIGHT interscalene catheter      Patient reassessed immediately prior to procedure    Patient location during procedure: pre-op  Reason for block: at surgeon's request and post-op pain management  Preanesthetic Checklist  Completed: patient identified, IV checked, site marked, risks and benefits discussed, surgical consent, monitors and equipment checked, pre-op evaluation and timeout performed  Prep:  Pt Position: left lateral decubitus  Sterile barriers:cap, gloves, mask and washed/disinfected hands  Prep: ChloraPrep  Patient monitoring: blood pressure monitoring, continuous pulse oximetry and EKG  Procedure    Sedation: yes  Performed under: local infiltration  Guidance:ultrasound guided    ULTRASOUND INTERPRETATION.  Using ultrasound guidance a 20 G gauge needle was placed in close proximity to the nerve, at which point, under ultrasound guidance anesthetic was injected in the area of the nerve and spread of the anesthesia was seen on ultrasound in close proximity thereto.  There were no abnormalities seen on ultrasound; a digital image was taken; and the patient tolerated the procedure with no complications. Images:still images obtained, printed/placed on chart    Laterality:right  Block Type:interscalene  Injection Technique:catheter  Needle Type:Tuohy and echogenic  Needle Gauge:18 G  Resistance on Injection: none  Catheter Size:20 G (20g)  Cath Depth at skin: 10 cm    Medications Used: bupivacaine PF (MARCAINE) 0.25 % injection - Injection   15 mL - 11/3/2022 6:45:00 AM      Post Assessment  Injection Assessment: negative aspiration for heme, no paresthesia on injection and incremental injection  Patient Tolerance:comfortable throughout block  Complications:no  Additional Notes    CATHETER  A high-frequency linear transducer, with sterile cover, was placed in the supraclavicular fossa to identify the subclavian artery and trunks and divisions of the brachial plexus. The transducer was then moved in a  "cephalad orientation with a slight rotation to continue visualization of the brachial plexus from the trunks and divisions, on to the C5-C7 roots. The insertion site was prepped and draped in sterile fashion. Skin and cutaneous tissue was infiltrated with 2-5 ml of 1% Lidocaine. Using ultrasound-guidance, an 18-gauge Contiplex Ultra 360 Touhy needle was advanced in plane from lateral to medial. Preservative-free normal saline was utilized for hydro-dissection of tissue, advancement of Touhy, and to confirm final needle placement at the fascial plane between the middle scalene muscle and sheath of the brachial plexus (C5-C7). A 20-gauge Contiplex Echo catheter was placed through the needle and advance out the tip of the Touhy 3-5 cm with the \"Barros Flip\". The Touhy needle was then removed, and final catheter position verified lateral to the brachial plexus with local anesthetic (LA) and ultrasound visualization. The catheter was secured in the usual fashion with skin glue, benzoin, steri-strips, CHG tegaderm and label noting \"Nerve Block Catheter\". Jerk tape applied at yellow connector and catheter connection. All LA was injected in increments of 3-5 ml after catheter secured. Aspiration every 5 ml to prevent intravascular injection. Injection was completed with negative aspiration of blood and negative intravascular injection. Injection pressures were normal with minimal resistance.            "

## 2022-11-03 NOTE — H&P
Pre-Op H&P  Shayne Neal  [id number]  1955    Chief complaint: right shoulder pain     HPI:    Patient is a 67 y.o.male who presents with a history of pain in the right shoulder joint due to osteoarthritis. He has failed to obtain adequate relief with more conservative treatment options and presents to the operating room today for surgical management with a right total shoulder replacement.      Review of Systems:  General ROS: negative for chills, fever or skin lesions;  No changes since last office visit.  Neg for recent sick exposure  Cardiovascular ROS: no chest pain or dyspnea on exertion  Respiratory ROS: no cough, shortness of breath, or wheezing    Allergies:   Allergies   Allergen Reactions   • Erythromycin Other (See Comments)     Redness         Home Meds:    No current facility-administered medications on file prior to encounter.     Current Outpatient Medications on File Prior to Encounter   Medication Sig Dispense Refill   • allopurinol (ZYLOPRIM) 300 MG tablet Take 1.5 tablets by mouth Daily. 1 1/2     • ARIPiprazole (ABILIFY) 15 MG tablet Take 7.5 mg by mouth Daily.     • carvedilol (COREG) 12.5 MG tablet Take 6.25 mg by mouth 2 (Two) Times a Day.     • cyclobenzaprine (FLEXERIL) 10 MG tablet Take 10 mg by mouth 3 (Three) Times a Day As Needed for Muscle Spasms.     • furosemide (LASIX) 40 MG tablet Take 1 tablet by mouth Daily.     • isosorbide dinitrate (ISORDIL) 30 MG tablet Take 1 tablet by mouth Daily.     • omeprazole (priLOSEC) 20 MG capsule Take 1 capsule by mouth Daily.     • prazosin (MINIPRESS) 2 MG capsule Take 4 capsules by mouth Every Night.     • rosuvastatin (CRESTOR) 40 MG tablet Take 40 mg by mouth Daily.     • traZODone (DESYREL) 100 MG tablet Take 2.5 tablets by mouth Every Night.     • clopidogrel (PLAVIX) 75 MG tablet Take 75 mg by mouth Daily.     • lisinopril (PRINIVIL,ZESTRIL) 5 MG tablet Take 1 tablet by mouth Every Night.     • meloxicam (MOBIC) 15 MG tablet Take  1 tablet by mouth Daily As Needed for Moderate Pain.     • nitroglycerin (NITROSTAT) 0.4 MG SL tablet 1 under the tongue as needed for angina, may repeat q5mins for up three doses (Patient taking differently: 1 tablet Every 5 (Five) Minutes As Needed for Chest Pain. 1 under the tongue as needed for angina, may repeat q5mins for up three doses) 25 tablet 2       PMH:   Past Medical History:   Diagnosis Date   • Asthma    • Chronic kidney disease    • Colon cancer (HCC)     colon cancer   • COPD (chronic obstructive pulmonary disease) (HCC)    • Coronary artery disease    • Diabetes mellitus (HCC)     diet controlled   • Gout    • Heart attack (HCC)    • History of transfusion     after knee surgery and with chemo, no reaction   • Hyperlipidemia    • Hypertension    • Neuropathy    • Sleep apnea     wears cpap with 2 liters at night   • Stroke (HCC)     , left sided weakness and periodic muscle jerking     PSH:    Past Surgical History:   Procedure Laterality Date   • CARDIAC CATHETERIZATION      LCR by Dr. Pastor / [facility]    • CATARACT EXTRACTION Bilateral    • CHOLECYSTECTOMY     • COLON RESECTION     • COLONOSCOPY     • CORONARY ARTERY BYPASS GRAFT      [facility]    • CORONARY STENT PLACEMENT     • INCISIONAL HERNIA REPAIR      abdomen   • TOTAL KNEE ARTHROPLASTY Left        Immunization History:  Influenza:   Pneumococcal: patient unsure of date   Tetanus: patient unsure of date     Social History:   Tobacco:   Social History     Tobacco Use   Smoking Status Former   • Packs/day: 2.00   • Years: 40.00   • Pack years: 80.00   • Types: Cigarettes   • Quit date:    • Years since quittin.8   Smokeless Tobacco Never      Alcohol:     Social History     Substance and Sexual Activity   Alcohol Use Not Currently    Comment:        Vitals:           /98 (BP Location: Left arm, Patient Position: Lying)   Pulse 87   Temp 97.5 °F (36.4 °C)  "(Temporal)   Resp 18   Ht 177.8 cm (70\")   Wt 115 kg (253 lb 1.4 oz)   SpO2 93%   BMI 36.31 kg/m²     Physical Exam:  General Appearance:    Alert, cooperative, no distress, appears stated age   Head:    Normocephalic, without obvious abnormality, atraumatic   Lungs:     Clear to auscultation bilaterally, respirations unlabored    Heart:   Regular rate and rhythm, S1 and S2 normal, no murmur, rub    or gallop    Abdomen:    Soft, nontender.  +bowel sounds   Breast Exam:    deferred   Genitalia:    deferred   Extremities:   Extremities normal, atraumatic, no cyanosis or edema   Skin:   Skin color, texture, turgor normal, no rashes or lesions   Neurologic:   Grossly intact   Results Review  LABS:  Lab Results   Component Value Date    WBC 7.03 11/01/2022    HGB 15.7 11/01/2022    HCT 45.3 11/01/2022    MCV 99.8 (H) 11/01/2022     11/01/2022    NEUTROABS 4.72 11/01/2022    GLUCOSE 91 11/01/2022    BUN 17 11/01/2022    CREATININE 1.25 11/01/2022     11/01/2022    K 4.7 11/01/2022     11/01/2022    CO2 31.0 (H) 11/01/2022    CALCIUM 9.2 11/01/2022    ALBUMIN 3.80 11/01/2022    AST 16 11/01/2022    ALT 21 11/01/2022    BILITOT 0.4 11/01/2022    PTT 30.5 11/01/2022    INR 1.00 11/01/2022       RADIOLOGY:  RIGHT interscalene catheter    Result Date: 11/3/2022  Juan Jose Hudson CRNA     11/3/2022  6:45 AM RIGHT interscalene catheter Patient reassessed immediately prior to procedure Patient location during procedure: pre-op Reason for block: at surgeon's request and post-op pain management Preanesthetic Checklist Completed: patient identified, IV checked, site marked, risks and benefits discussed, surgical consent, monitors and equipment checked, pre-op evaluation and timeout performed Prep: Pt Position: left lateral decubitus Sterile barriers:cap, gloves, mask and washed/disinfected hands Prep: ChloraPrep Patient monitoring: blood pressure monitoring, continuous pulse oximetry and EKG Procedure " Sedation: yes Performed under: local infiltration Guidance:ultrasound guided ULTRASOUND INTERPRETATION.  Using ultrasound guidance a 20 G gauge needle was placed in close proximity to the nerve, at which point, under ultrasound guidance anesthetic was injected in the area of the nerve and spread of the anesthesia was seen on ultrasound in close proximity thereto.  There were no abnormalities seen on ultrasound; a digital image was taken; and the patient tolerated the procedure with no complications. Images:still images obtained, printed/placed on chart Laterality:right Block Type:interscalene Injection Technique:catheter Needle Type:Tuohy and echogenic Needle Gauge:18 G Resistance on Injection: none Catheter Size:20 G (20g) Cath Depth at skin: 10 cm Medications Used: bupivacaine PF (MARCAINE) 0.25 % injection - Injection  15 mL - 11/3/2022 6:45:00 AM Post Assessment Injection Assessment: negative aspiration for heme, no paresthesia on injection and incremental injection Patient Tolerance:comfortable throughout block Complications:no Additional Notes CATHETER A high-frequency linear transducer, with sterile cover, was placed in the supraclavicular fossa to identify the subclavian artery and trunks and divisions of the brachial plexus. The transducer was then moved in a cephalad orientation with a slight rotation to continue visualization of the brachial plexus from the trunks and divisions, on to the C5-C7 roots. The insertion site was prepped and draped in sterile fashion. Skin and cutaneous tissue was infiltrated with 2-5 ml of 1% Lidocaine. Using ultrasound-guidance, an 18-gauge Contiplex Ultra 360 Touhy needle was advanced in plane from lateral to medial. Preservative-free normal saline was utilized for hydro-dissection of tissue, advancement of Touhy, and to confirm final needle placement at the fascial plane between the middle scalene muscle and sheath of the brachial plexus (C5-C7). A 20-gauge Contiplex Echo  "catheter was placed through the needle and advance out the tip of the Touhy 3-5 cm with the \"Barros Flip\". The Touhy needle was then removed, and final catheter position verified lateral to the brachial plexus with local anesthetic (LA) and ultrasound visualization. The catheter was secured in the usual fashion with skin glue, benzoin, steri-strips, CHG tegaderm and label noting \"Nerve Block Catheter\". Jerk tape applied at yellow connector and catheter connection. All LA was injected in increments of 3-5 ml after catheter secured. Aspiration every 5 ml to prevent intravascular injection. Injection was completed with negative aspiration of blood and negative intravascular injection. Injection pressures were normal with minimal resistance.        Cancer Staging (if applicable)  Cancer Patient: __ yes _x_no __unknown; If yes, clinical stage T:__ N:__M:__, stage group or __N/A    Impression: primary osteoarthritis of right shoulder    Plan: right total shoulder arthroplasty       Marcelo Valderrama PA-C   11/03/22   6:56 AM EDT   "

## 2022-11-03 NOTE — THERAPY EVALUATION
Patient Name: Shayne Neal  : 1955    MRN: [id number]                              Today's Date: 11/3/2022       Admit Date: 11/3/2022    Visit Dx: No diagnosis found.  Patient Active Problem List   Diagnosis   • Glenohumeral arthritis, right   • Asthma   • COPD (chronic obstructive pulmonary disease) (HCC)   • Gout   • CAD (coronary artery disease)   • HTN (hypertension)   • NYDIA (obstructive sleep apnea)   • Status post total replacement of right shoulder     Past Medical History:   Diagnosis Date   • Asthma    • Chronic kidney disease    • Colon cancer (HCC)     colon cancer   • COPD (chronic obstructive pulmonary disease) (HCC)    • Coronary artery disease    • Diabetes mellitus (HCC)     diet controlled   • Gout    • Heart attack (HCC)    • History of transfusion     after knee surgery and with chemo, no reaction   • Hyperlipidemia    • Hypertension    • Neuropathy    • Sleep apnea     wears cpap with 2 liters at night   • Stroke ([facility])     , left sided weakness and periodic muscle jerking     Past Surgical History:   Procedure Laterality Date   • CARDIAC CATHETERIZATION      LCR by Dr. Pastor / [facility]    • CATARACT EXTRACTION Bilateral    • CHOLECYSTECTOMY     • COLON RESECTION     • COLONOSCOPY     • CORONARY ARTERY BYPASS GRAFT      [facility]    • CORONARY STENT PLACEMENT     • INCISIONAL HERNIA REPAIR      abdomen   • TOTAL KNEE ARTHROPLASTY Left       General Information     Row Name 22 1529          OT Time and Intention    Document Type evaluation  -MR     Mode of Treatment individual therapy;occupational therapy  -MR     Row Name 22 1529          General Information    Patient Profile Reviewed yes  -MR     Prior Level of Function min assist:;ADL's;independent:;all household mobility;community mobility;gait;transfer;bed mobility;using stairs  Pt was I w/ mobility w/o use of AD. Pt reporting owning a RW and cane. Reports 1  recent fall. Walk in shower w/ seat.  -MR     Existing Precautions/Restrictions fall;right;non-weight bearing;shoulder;oxygen therapy device and L/min  Ultra Sling II w/o ABD pillow; RUE NWB; R interscalene nerve cath  -MR     Row Name 11/03/22 1529          Living Environment    People in Home spouse  -MR     Row Name 11/03/22 1529          Home Main Entrance    Number of Stairs, Main Entrance one  -MR     Stair Railings, Main Entrance none  -MR     Row Name 11/03/22 1529          Stairs Within Home, Primary    Number of Stairs, Within Home, Primary none  -MR     Stair Railings, Within Home, Primary none  -MR     Row Name 11/03/22 1529          Cognition    Orientation Status (Cognition) oriented x 3  -MR     Row Name 11/03/22 1529          Safety Issues, Functional Mobility    Safety Issues Affecting Function (Mobility) insight into deficits/self-awareness;safety precaution awareness;safety precautions follow-through/compliance  -MR     Impairments Affecting Function (Mobility) strength;range of motion (ROM);shortness of breath;endurance/activity tolerance  -MR           User Key  (r) = Recorded By, (t) = Taken By, (c) = Cosigned By    Initials Name Provider Type    MR GuillermoJill, OT Occupational Therapist                 Mobility/ADL's     Row Name 11/03/22 1533          Bed Mobility    Bed Mobility supine-sit;sit-supine  -MR     Supine-Sit Hendricks (Bed Mobility) minimum assist (75% patient effort)  -MR     Sit-Supine Hendricks (Bed Mobility) minimum assist (75% patient effort)  -MR     Bed Mobility, Safety Issues decreased use of arms for pushing/pulling  -MR     Assistive Device (Bed Mobility) head of bed elevated;bed rails  -MR     Comment, (Bed Mobility) Thomas for supine to sit and sit to supinee w/ v/c to adhere to shoulder precautions. Pt reporting that he plans to sleep in recliner at home.  -MR     Row Name 11/03/22 1533          Transfers    Transfers sit-stand transfer  -MR     Comment,  (Transfers) Pt educated on safety and location of nerve cath to prevent accidental dislodgement.  -     Row Name 11/03/22 1533          Sit-Stand Transfer    Sit-Stand Bryan (Transfers) minimum assist (75% patient effort);verbal cues  -     Row Name 11/03/22 1533          Activities of Daily Living    BADL Assessment/Intervention bathing;upper body dressing;lower body dressing  -     Row Name 11/03/22 1533          Mobility    Extremity Weight-bearing Status right upper extremity  -MR     Right Upper Extremity (Weight-bearing Status) non weight-bearing (NWB)   -MR     Row Name 11/03/22 1533          Bathing Assessment/Intervention    Bryan Level (Bathing) bathing skills  -MR     Comment, (Bathing) Pt and spouse educated on R axilla hygiene and waiting to shower once nerve cath has been d/c.  -MR Bates Name 11/03/22 1533          Upper Body Dressing Assessment/Training    Bryan Level (Upper Body Dressing) don;doff;other (see comments);maximum assist (25% patient effort)  sling  -MR     Position (Upper Body Dressing) sitting up in bed  -MR     Comment, (Upper Body Dressing) Pt and spouse educated on R shoulder precautions, R axilla hygiene, izabel dressing, sling management and nerve cath management to prevent accidental dislodgement during ADLs.  -     Row Name 11/03/22 1533          Lower Body Dressing Assessment/Training    Bryan Level (Lower Body Dressing) don;socks;dependent (less than 25% patient effort)  -MR     Position (Lower Body Dressing) supine  -MR           User Key  (r) = Recorded By, (t) = Taken By, (c) = Cosigned By    Initials Name Provider Type     Jill Li, OT Occupational Therapist               Obj/Interventions     Row Name 11/03/22 1542          Sensory Assessment (Somatosensory)    Sensory Assessment (Somatosensory) UE sensation intact;other (see comments)  Pt reporting numbness and tingling in the R thumb from nerve cath  -     Row Name 11/03/22  1542          Vision Assessment/Intervention    Visual Impairment/Limitations WFL;corrective lenses full-time  -MR     Row Name 11/03/22 1542          Range of Motion Comprehensive    Comment, General Range of Motion DNT RUE shoulder ROM , elbow/wrist/hand WFL; LUE WFL  -MR     Row Name 11/03/22 1542          Strength Comprehensive (MMT)    Comment, General Manual Muscle Testing (MMT) Assessment LUE grossly 3+/5 in all functional planes  -MR     Row Name 11/03/22 1542          Elbow/Forearm (Therapeutic Exercise)    Elbow/Forearm (Therapeutic Exercise) AROM (active range of motion)  -MR     Elbow/Forearm AROM (Therapeutic Exercise) right;flexion;extension;supination;pronation;10 repetitions;supine  -MR     Row Name 11/03/22 1542          Wrist (Therapeutic Exercise)    Wrist (Therapeutic Exercise) AROM (active range of motion)  -MR     Wrist AROM (Therapeutic Exercise) right;flexion;extension;10 repetitions  -MR     Row Name 11/03/22 1542          Hand (Therapeutic Exercise)    Hand (Therapeutic Exercise) AROM (active range of motion)  -MR     Hand AROM/AAROM (Therapeutic Exercise) right;finger flexion;finger extension;10 repetitions  -MR     Row Name 11/03/22 1542          Motor Skills    Therapeutic Exercise elbow/forearm;hand;wrist  -MR     Row Name 11/03/22 1542          Balance    Balance Assessment sitting static balance;sitting dynamic balance;standing static balance  -     Static Sitting Balance supervision  -     Dynamic Sitting Balance supervision  -     Position, Sitting Balance unsupported;sitting edge of bed  -     Static Standing Balance contact guard  -     Balance Interventions sitting;standing;sit to stand;supported;static;dynamic;occupation based/functional task  -           User Key  (r) = Recorded By, (t) = Taken By, (c) = Cosigned By    Initials Name Provider Type     Jill Li OT Occupational Therapist               Goals/Plan     Row Name 11/03/22 3746          Transfer  Goal 1 (OT)    Activity/Assistive Device (Transfer Goal 1, OT) bed-to-chair/chair-to-bed  -MR     W[place]worth Level/Cues Needed (Transfer Goal 1, OT) contact guard required  -MR     Time Frame (Transfer Goal 1, OT) long term goal (LTG);by discharge  -MR     Progress/Outcome (Transfer Goal 1, OT) new goal  -MR     Row Name 11/03/22 1556          Bathing Goal 1 (OT)    Activity/Device (Bathing Goal 1, OT) upper body bathing  -MR     [place] Level/Cues Needed (Bathing Goal 1, OT) supervision required;other (see comments)  provided education to spouse for completion  -MR     Time Frame (Bathing Goal 1, OT) long term goal (LTG);by discharge  -MR     Progress/Outcomes (Bathing Goal 1, OT) new goal  -MR     Row Name 11/03/22 0793          Strength Goal 1 (OT)    Strength Goal 1 (OT) Patient and spouse will demo ability to complete HEP w/in surgeon parameters w/o assist.  -MR     Time Frame (Strength Goal 1, OT) long term goal (LTG);by discharge  -MR     Progress/Outcome (Strength Goal 1, OT) new goal  -MR     Row Name 11/03/22 4786          Problem Specific Goal 1 (OT)    Problem Specific Goal 1 (OT) Spouse will dmeo ability to lee ann/doff sling appropriately w/ supervision.  -MR     Time Frame (Problem Specific Goal 1, OT) long term goal (LTG);by discharge  -MR     Progress/Outcome (Problem Specific Goal 1, OT) new goal  -MR     Row Name 11/03/22 4398          Therapy Assessment/Plan (OT)    Planned Therapy Interventions (OT) activity tolerance training;adaptive equipment training;BADL retraining;functional balance retraining;IADL retraining;occupation/activity based interventions;patient/caregiver education/training;transfer/mobility retraining;strengthening exercise;ROM/therapeutic exercise  -MR           User Key  (r) = Recorded By, (t) = Taken By, (c) = Cosigned By    Initials Name Provider Type    Jill Carreon, OT Occupational Therapist               Clinical Impression     Row Name 11/03/22 9646           Pain Assessment    Pretreatment Pain Rating 6/10  -MR     Posttreatment Pain Rating 6/10  -MR     Pain Location - Side/Orientation Right  -MR     Pain Location generalized  -MR     Pain Location - other (see comments)  axilla  -MR     Pre/Posttreatment Pain Comment RN aware and managing  -MR     Pain Intervention(s) Ambulation/increased activity;Repositioned  -MR Bates Name 11/03/22 1545          Plan of Care Review    Plan of Care Reviewed With patient;spouse  -MR     Progress no change  Initial Eval  -MR     Outcome Evaluation OT initial eval completed. Pt presenting s/p R TSA. OT educated pt and spouse on R shoulder precautions, sling management, hemidressing technique, R axilla hygiene, HEP w/ surgeon parameters and management of nerve cath to prevent accidental dislodgement. Pt requiring 2 L of O2 via NC to maintain > 90%. Demo good effort w/ HEP w/in surgeon parameters completing AROM elbow/wrist/hand . MaxA for doffing/donning sling w/ spouse observing throughout. MaxA for LB dressing. Pt would benefit from PT eval d/t recent fall. Recommend home w/ assist when medically appropriate.  -MR Bates Name 11/03/22 1545          Therapy Assessment/Plan (OT)    Patient/Family Therapy Goal Statement (OT) Return to PLOF  -MR     Rehab Potential (OT) good, to achieve stated therapy goals  -MR     Criteria for Skilled Therapeutic Interventions Met (OT) yes;meets criteria;skilled treatment is necessary  -MR     Therapy Frequency (OT) daily  -MR Bates Name 11/03/22 1545          Therapy Plan Review/Discharge Plan (OT)    Anticipated Discharge Disposition (OT) home with assist  -MR Bates Name 11/03/22 1545          Vital Signs    Pre Systolic BP Rehab 142  -MR     Pre Treatment Diastolic BP 93  -MR     Pretreatment Heart Rate (beats/min) 95  -MR     Posttreatment Heart Rate (beats/min) 95  -MR     Pre SpO2 (%) 96  -MR     O2 Delivery Pre Treatment nasal cannula  -MR     Intra SpO2 (%) 94  -MR     O2 Delivery Intra  Treatment nasal cannula  -MR     Post SpO2 (%) 96  -MR     O2 Delivery Post Treatment nasal cannula  -MR     Pre Patient Position Supine  -MR     Intra Patient Position Standing  -MR     Post Patient Position Supine  -MR     Row Name 11/03/22 1545          Positioning and Restraints    Pre-Treatment Position in bed  -MR     Post Treatment Position bed  -MR     In Bed notified nsg;call light within reach;encouraged to call for assist;exit alarm on;fowlers;with family/caregiver;with nsg;SCD pump applied  RUE in sling w/ ice applied to the shoulder.  -MR           User Key  (r) = Recorded By, (t) = Taken By, (c) = Cosigned By    Initials Name Provider Type     Jill Li OT Occupational Therapist               Outcome Measures     Row Name 11/03/22 9876          How much help from another is currently needed...    Putting on and taking off regular lower body clothing? 2  -MR     Bathing (including washing, rinsing, and drying) 2  -MR     Toileting (which includes using toilet bed pan or urinal) 2  -MR     Putting on and taking off regular upper body clothing 2  -MR     Taking care of personal grooming (such as brushing teeth) 3  -MR     Eating meals 3  -MR     AM-PAC 6 Clicks Score (OT) 14  -MR     Row Name 11/03/22 1556          Functional Assessment    Outcome Measure Options AM-PAC 6 Clicks Daily Activity (OT)  -MR           User Key  (r) = Recorded By, (t) = Taken By, (c) = Cosigned By    Initials Name Provider Type     Jill Li OT Occupational Therapist                Occupational Therapy Education     Title: PT OT SLP Therapies (In Progress)     Topic: Occupational Therapy (Done)     Point: ADL training (Done)     Description:   Instruct learner(s) on proper safety adaptation and remediation techniques during self care or transfers.   Instruct in proper use of assistive devices.              Learning Progress Summary           Patient Acceptance, E, VU by MR at 11/3/2022 1557   Significant Other  Acceptance, E, VU by MR at 11/3/2022 1557                   Point: Home exercise program (Done)     Description:   Instruct learner(s) on appropriate technique for monitoring, assisting and/or progressing therapeutic exercises/activities.              Learning Progress Summary           Patient Acceptance, E, VU by MR at 11/3/2022 1557   Significant Other Acceptance, E, VU by MR at 11/3/2022 1557                   Point: Precautions (Done)     Description:   Instruct learner(s) on prescribed precautions during self-care and functional transfers.              Learning Progress Summary           Patient Acceptance, E, VU by MR at 11/3/2022 1557   Significant Other Acceptance, E, VU by MR at 11/3/2022 1557                   Point: Body mechanics (Done)     Description:   Instruct learner(s) on proper positioning and spine alignment during self-care, functional mobility activities and/or exercises.              Learning Progress Summary           Patient Acceptance, E, VU by MR at 11/3/2022 1557   Significant Other Acceptance, E, VU by MR at 11/3/2022 1557                               User Key     Initials Effective Dates Name Provider Type Discipline    MR 09/22/22 -  Jill Li OT Occupational Therapist OT              OT Recommendation and Plan  Planned Therapy Interventions (OT): activity tolerance training, adaptive equipment training, BADL retraining, functional balance retraining, IADL retraining, occupation/activity based interventions, patient/caregiver education/training, transfer/mobility retraining, strengthening exercise, ROM/therapeutic exercise  Therapy Frequency (OT): daily  Plan of Care Review  Plan of Care Reviewed With: patient, spouse  Progress: no change (Initial Eval)  Outcome Evaluation: OT initial eval completed. Pt presenting s/p R TSA. OT educated pt and spouse on R shoulder precautions, sling management, hemidressing technique, R axilla hygiene, HEP w/ surgeon parameters and management  of nerve cath to prevent accidental dislodgement. Pt requiring 2 L of O2 via NC to maintain > 90%. Demo good effort w/ HEP w/in surgeon parameters completing AROM elbow/wrist/hand . MaxA for doffing/donning sling w/ spouse observing throughout. MaxA for LB dressing. Pt would benefit from PT eval d/t recent fall. Recommend home w/ assist when medically appropriate.     Time Calculation:    Time Calculation- OT     Row Name 11/03/22 1558             Time Calculation- OT    OT Start Time 1450  -MR      OT Received On 11/03/22  -MR      OT Goal Re-Cert Due Date 11/13/22  -MR         Timed Charges    15714 - OT Therapeutic Exercise Minutes 10  -MR      30142 - OT Therapeutic Activity Minutes 3  -MR      72594 - OT Self Care/Mgmt Minutes 10  -MR         Untimed Charges    OT Eval/Re-eval Minutes 46  -MR         Total Minutes    Timed Charges Total Minutes 23  -MR      Untimed Charges Total Minutes 46  -MR       Total Minutes 69  -MR            User Key  (r) = Recorded By, (t) = Taken By, (c) = Cosigned By    Initials Name Provider Type     Jill Li, OT Occupational Therapist              Therapy Charges for Today     Code Description Service Date Service Provider Modifiers Qty    47459889260 HC OT EVAL MOD COMPLEXITY 4 11/3/2022 Jill Li OT GO 1    00559548015 HC OT SELF CARE/MGMT/TRAIN EA 15 MIN 11/3/2022 Jill Li OT GO 1    58843419244 HC OT THER PROC EA 15 MIN 11/3/2022 Jill Li OT GO 1               Jill Li OT  11/3/2022

## 2022-11-03 NOTE — ANESTHESIA PREPROCEDURE EVALUATION
Anesthesia Evaluation                  Airway   Mallampati: II  Dental      Pulmonary    Cardiovascular     (+) hypertension, CABG,       Neuro/Psych  GI/Hepatic/Renal/Endo    (+)   diabetes mellitus,     Musculoskeletal     Abdominal    Substance History      OB/GYN          Other                        Anesthesia Plan    ASA 3     general with block     intravenous induction     Anesthetic plan, risks, benefits, and alternatives have been provided, discussed and informed consent has been obtained with: patient.        CODE STATUS:

## 2022-11-03 NOTE — ANESTHESIA POSTPROCEDURE EVALUATION
"Patient: Shayne Neal    Procedure Summary     Date: 11/03/22 [facility] / [facility]:  [facility]    Anesthesia Start: 0725 Anesthesia Stop: 1009    Procedure: TOTAL SHOULDER ARTHROPLASTY - RIGHT (Right: Shoulder) Diagnosis:       Glenohumeral arthritis, right      (glenohumeral arthritis)    Surgeons: Jose Raul Yin MD Provider: Braydon Wilson MD    Anesthesia Type: general with block ASA Status: 3          Anesthesia Type: general with block    Vitals  No vitals data found for the desired time range.          Post Anesthesia Care and Evaluation    Patient location during evaluation: PACU  Patient participation: complete - patient participated  Level of consciousness: awake and responsive to verbal stimuli  Pain score: 2  Pain management: adequate    Airway patency: patent  Anesthetic complications: No anesthetic complications    Cardiovascular status: acceptable  Respiratory status: acceptable  Hydration status: acceptable    Comments: Pt awake and responsive. SV. VSS. Report to RN. Patient Vitals in the past 24 hrs:  11/03/22 0625, BP:125/98, Temp:97.5 °F (36.4 °C), Temp src:Temporal, Pulse:87, Resp:18, SpO2:93 %, Height:177.8 cm (70\"), Weight:115 kg (253 lb 1.4 oz)  133/78. p 72. r 16. t 98.1                  "

## 2022-11-04 VITALS
OXYGEN SATURATION: 92 % | SYSTOLIC BLOOD PRESSURE: 102 MMHG | BODY MASS INDEX: 36.23 KG/M2 | HEART RATE: 95 BPM | TEMPERATURE: 97.7 F | DIASTOLIC BLOOD PRESSURE: 76 MMHG | RESPIRATION RATE: 16 BRPM | WEIGHT: 253.09 LBS | HEIGHT: 70 IN

## 2022-11-04 PROBLEM — I50.22 CHRONIC SYSTOLIC HEART FAILURE (HCC): Status: ACTIVE | Noted: 2022-11-04

## 2022-11-04 LAB
ANION GAP SERPL CALCULATED.3IONS-SCNC: 12 MMOL/L (ref 5–15)
BASOPHILS # BLD AUTO: 0.02 10*3/MM3 (ref 0–0.2)
BASOPHILS NFR BLD AUTO: 0.2 % (ref 0–1.5)
BUN SERPL-MCNC: 15 MG/DL (ref 8–23)
BUN/CREAT SERPL: 12.7 (ref 7–25)
CALCIUM SPEC-SCNC: 8.3 MG/DL (ref 8.6–10.5)
CHLORIDE SERPL-SCNC: 103 MMOL/L (ref 98–107)
CO2 SERPL-SCNC: 24 MMOL/L (ref 22–29)
CREAT SERPL-MCNC: 1.18 MG/DL (ref 0.76–1.27)
DEPRECATED RDW RBC AUTO: 51.3 FL (ref 37–54)
EGFRCR SERPLBLD CKD-EPI 2021: 66.8 ML/MIN/1.73
EOSINOPHIL # BLD AUTO: 0 10*3/MM3 (ref 0–0.4)
EOSINOPHIL NFR BLD AUTO: 0 % (ref 0.3–6.2)
ERYTHROCYTE [DISTWIDTH] IN BLOOD BY AUTOMATED COUNT: 13.7 % (ref 12.3–15.4)
GLUCOSE SERPL-MCNC: 143 MG/DL (ref 65–99)
HCT VFR BLD AUTO: 41.2 % (ref 37.5–51)
HGB BLD-MCNC: 12.7 G/DL (ref 13–17.7)
IMM GRANULOCYTES # BLD AUTO: 0.1 10*3/MM3 (ref 0–0.05)
IMM GRANULOCYTES NFR BLD AUTO: 0.9 % (ref 0–0.5)
LYMPHOCYTES # BLD AUTO: 0.86 10*3/MM3 (ref 0.7–3.1)
LYMPHOCYTES NFR BLD AUTO: 7.6 % (ref 19.6–45.3)
MCH RBC QN AUTO: 31.8 PG (ref 26.6–33)
MCHC RBC AUTO-ENTMCNC: 30.8 G/DL (ref 31.5–35.7)
MCV RBC AUTO: 103 FL (ref 79–97)
MONOCYTES # BLD AUTO: 0.68 10*3/MM3 (ref 0.1–0.9)
MONOCYTES NFR BLD AUTO: 6 % (ref 5–12)
NEUTROPHILS NFR BLD AUTO: 85.3 % (ref 42.7–76)
NEUTROPHILS NFR BLD AUTO: 9.62 10*3/MM3 (ref 1.7–7)
NRBC BLD AUTO-RTO: 0 /100 WBC (ref 0–0.2)
PLATELET # BLD AUTO: 99 10*3/MM3 (ref 140–450)
PMV BLD AUTO: 10.9 FL (ref 6–12)
POTASSIUM SERPL-SCNC: 4.4 MMOL/L (ref 3.5–5.2)
RBC # BLD AUTO: 4 10*6/MM3 (ref 4.14–5.8)
SODIUM SERPL-SCNC: 139 MMOL/L (ref 136–145)
WBC NRBC COR # BLD: 11.28 10*3/MM3 (ref 3.4–10.8)

## 2022-11-04 PROCEDURE — A9270 NON-COVERED ITEM OR SERVICE: HCPCS | Performed by: INTERNAL MEDICINE

## 2022-11-04 PROCEDURE — 63710000001 ISOSORBIDE DINITRATE 20 MG TABLET: Performed by: INTERNAL MEDICINE

## 2022-11-04 PROCEDURE — 63710000001 OXYCODONE 5 MG TABLET: Performed by: ORTHOPAEDIC SURGERY

## 2022-11-04 PROCEDURE — 85025 COMPLETE CBC W/AUTO DIFF WBC: CPT | Performed by: ORTHOPAEDIC SURGERY

## 2022-11-04 PROCEDURE — A9270 NON-COVERED ITEM OR SERVICE: HCPCS | Performed by: ORTHOPAEDIC SURGERY

## 2022-11-04 PROCEDURE — 63710000001 ARIPIPRAZOLE 15 MG TABLET: Performed by: INTERNAL MEDICINE

## 2022-11-04 PROCEDURE — 25010000002 CEFAZOLIN PER 500 MG: Performed by: ORTHOPAEDIC SURGERY

## 2022-11-04 PROCEDURE — 63710000001 CARVEDILOL 6.25 MG TABLET: Performed by: INTERNAL MEDICINE

## 2022-11-04 PROCEDURE — 97535 SELF CARE MNGMENT TRAINING: CPT

## 2022-11-04 PROCEDURE — G0378 HOSPITAL OBSERVATION PER HR: HCPCS

## 2022-11-04 PROCEDURE — 80048 BASIC METABOLIC PNL TOTAL CA: CPT | Performed by: ORTHOPAEDIC SURGERY

## 2022-11-04 PROCEDURE — 63710000001 DULOXETINE 30 MG CAPSULE DELAYED-RELEASE PARTICLES: Performed by: INTERNAL MEDICINE

## 2022-11-04 PROCEDURE — 63710000001 PANTOPRAZOLE 40 MG TABLET DELAYED-RELEASE: Performed by: INTERNAL MEDICINE

## 2022-11-04 PROCEDURE — 63710000001 ASPIRIN 81 MG TABLET DELAYED-RELEASE: Performed by: INTERNAL MEDICINE

## 2022-11-04 PROCEDURE — 97162 PT EVAL MOD COMPLEX 30 MIN: CPT

## 2022-11-04 PROCEDURE — 63710000001 ALLOPURINOL 300 MG TABLET: Performed by: INTERNAL MEDICINE

## 2022-11-04 PROCEDURE — 97110 THERAPEUTIC EXERCISES: CPT

## 2022-11-04 PROCEDURE — 63710000001 FUROSEMIDE 40 MG TABLET: Performed by: INTERNAL MEDICINE

## 2022-11-04 RX ORDER — ROPIVACAINE HYDROCHLORIDE 2 MG/ML
1 INJECTION, SOLUTION EPIDURAL; INFILTRATION; PERINEURAL CONTINUOUS
Start: 2022-11-04

## 2022-11-04 RX ORDER — OXYCODONE HYDROCHLORIDE 5 MG/1
5 TABLET ORAL EVERY 4 HOURS PRN
Qty: 25 TABLET | Refills: 0 | Status: SHIPPED | OUTPATIENT
Start: 2022-11-04 | End: 2022-11-13

## 2022-11-04 RX ADMIN — OXYCODONE 10 MG: 5 TABLET ORAL at 03:51

## 2022-11-04 RX ADMIN — PANTOPRAZOLE SODIUM 40 MG: 40 TABLET, DELAYED RELEASE ORAL at 08:02

## 2022-11-04 RX ADMIN — FUROSEMIDE 40 MG: 40 TABLET ORAL at 08:02

## 2022-11-04 RX ADMIN — ASPIRIN 81 MG: 81 TABLET, COATED ORAL at 08:02

## 2022-11-04 RX ADMIN — DULOXETINE HYDROCHLORIDE 30 MG: 30 CAPSULE, DELAYED RELEASE ORAL at 08:02

## 2022-11-04 RX ADMIN — ALLOPURINOL 450 MG: 300 TABLET ORAL at 08:02

## 2022-11-04 RX ADMIN — OXYCODONE 5 MG: 5 TABLET ORAL at 08:03

## 2022-11-04 RX ADMIN — CARVEDILOL 6.25 MG: 6.25 TABLET, FILM COATED ORAL at 08:02

## 2022-11-04 RX ADMIN — ARIPIPRAZOLE 7.5 MG: 15 TABLET ORAL at 08:01

## 2022-11-04 RX ADMIN — ISOSORBIDE DINITRATE 30 MG: 20 TABLET ORAL at 08:01

## 2022-11-04 RX ADMIN — OXYCODONE 10 MG: 5 TABLET ORAL at 13:33

## 2022-11-04 RX ADMIN — CEFAZOLIN 2 G: 10 INJECTION, POWDER, FOR SOLUTION INTRAVENOUS at 00:02

## 2022-11-04 NOTE — DISCHARGE SUMMARY
Patient Name: Shayne Neal  MRN: [id number]  : 1955  DOS: 2022    Attending: Jose Raul Yin MD    Primary Care Provider: Iveth Pierce MD    Date of Admission:.11/3/2022  5:47 AM    Date of Discharge:  2022    Discharge Diagnosis:   Status post total replacement of right shoulder    Glenohumeral arthritis, right    Asthma    COPD (chronic obstructive pulmonary disease) ([facility])    Gout    CAD (coronary artery disease)    HTN (hypertension)    NYDIA (obstructive sleep apnea)    Chronic systolic heart failure (CMS/[facility])      Hospital Course    At admit:  Patient is a pleasant 67 y.o. male presented for scheduled surgery by Dr. Yin.     He underwent right total shoulder arthro-under GA and a block, tolerated surgery well, was admitted for further management.     Seen in his room postoperatively, doing fairly well, complains of some degree of postoperative pain, no nausea, vomiting, or shortness of breath.     Reviewed with patient his extensive past medical history including that of coronary artery disease with remote CABG and subsequent multiple stents, no recent angina or anginal equivalent.     Other history includes gouty arthritis, depression anxiety, GERD, among other problems.  He does have chronic kidney disease and COPD as well.    After admit:  Patient was provided pain medications as needed for pain control, along with interscalene nerve block infusion of Ropivacaine    Adjustments were made to pain medications to optimize postop pain management.   Risks and benefits of opiate medications discussed with patient. [facility] report on chart was reviewed.    The patient was seen by OT and was taught exercises for his right arm.  The patient used an IS for atelectasis prophylaxis and mechanicals for DVT prophylaxis.    Home medications were resumed as appropriate, and labs were monitored and remained fairly stable.     With the progress he has made, he is ready for DC home  today.      The patient will have an Infupump ( instructed on it during this admit)  Discussed with patient regarding plan and he shows understanding and agreement.        Procedures Performed  Procedure(s):  TOTAL SHOULDER ARTHROPLASTY - RIGHT       Pertinent Test Results:    I reviewed the patient's new clinical results.   Results from last 7 days   Lab Units 11/04/22  0404 11/01/22  1456   WBC 10*3/mm3 11.28* 7.03   HEMOGLOBIN g/dL 12.7* 15.7   HEMATOCRIT % 41.2 45.3   PLATELETS 10*3/mm3 99* 141     Results from last 7 days   Lab Units 11/04/22  0404 11/01/22  1456   SODIUM mmol/L 139 142   POTASSIUM mmol/L 4.4 4.7   CHLORIDE mmol/L 103 104   CO2 mmol/L 24.0 31.0*   BUN mg/dL 15 17   CREATININE mg/dL 1.18 1.25   CALCIUM mg/dL 8.3* 9.2   BILIRUBIN mg/dL  --  0.4   ALK PHOS U/L  --  57   ALT (SGPT) U/L  --  21   AST (SGOT) U/L  --  16   GLUCOSE mg/dL 143* 91     I reviewed the patient's new imaging including images and reports.      Occupational Therapy  Progress: improving  Outcome Evaluation: OT re-educated pt on R shoulder precautions, izabel-dressing technique, sling management, nerve cath management to prevent accidental dislodgement, R axilla hygiene and HEP w/in surgeon parameters. Pt completed bed mobility w/ Thomas supine to sit, CGA for sit to supine. OT doffed sling w/ MaxA providing education to spouse. Pt demo good effort w/ HEP w/in surgeon parameters, pt and spouse demo understanding. Thomas for UB dressing w/ assist for nerve cath. CGA for LB dressing. O2 was doffed during dressing tasks for approx 2 mins, pt desat to 86%. O2 via NC placed on pt and quick recovery noted. Spouse demo ability and understanding to lee ann sling w/ CGA. Pt returned to supine w/ CGA. O2 left off pt w/ sats being 92%, RN notified. Pt is cleared by OT for d/c when medically appropriate.    Physical therapy  Progress: no change  Outcome Evaluation: PT initial evaluation complete. Pt presents with mild generalized weakness, decreased  "functional activity tolerance, and impaired balance warranting further skilled acute PT services. Pt performed bed mobility with MinAx1, transfers with CGA and no AD, ambulated 225ft with no AD initially with CGA and progressing to SBA, and ascended/descended 1 step with CGA and no UE support. Pt limited d/t mild fatigue w/ SpO2 decline to a minimum of 91% on 2L O2 via NC. PT reviewed/demonstrated technique for car transfers. PT recommending return home with assist and  PT when medically appropriate.    Discharge Assessment:       Visit Vitals  /99   Pulse 85   Temp 97.8 °F (36.6 °C) (Oral)   Resp 16   Ht 177.8 cm (70\")   Wt 115 kg (253 lb 1.4 oz)   SpO2 90%   BMI 36.31 kg/m²     Temp (24hrs), Av.6 °F (37 °C), Min:97.8 °F (36.6 °C), Max:99.1 °F (37.3 °C)      General Appearance:    Alert, cooperative, in no acute distress   Lungs:     Clear to auscultation,respirations regular, even and   unlabored    Heart:    Regular rhythm and normal rate, normal S1 and S2    Abdomen:     Normal bowel sounds, no masses, no organomegaly, soft        non-tender, non-distended, no guarding, no rebound                 tenderness   Extremities:   RUE in a sling, CDI surgical dressing over right shoulder incision . Interscalene nerve block cath present.  Distal pulses, cap refill,  intact. Movement and sensation intact     Pulses:   Pulses palpable and equal bilaterally   Skin:   No bleeding, bruising or rash        Discharge Disposition: home          Discharge Medications      New Medications      Instructions Start Date   oxyCODONE 5 MG immediate release tablet  Commonly known as: ROXICODONE   5 mg, Oral, Every 4 Hours PRN      ropivacaine 0.2 % infusion (INFUSYSTEM)  Commonly known as: NAROPIN   1 mL/hr (2 mg/hr), Peripheral Nerve, Continuous         Changes to Medications      Instructions Start Date   nitroglycerin 0.4 MG SL tablet  Commonly known as: NITROSTAT  What changed:   · how much to take  · when to take " this  · reasons to take this   1 under the tongue as needed for angina, may repeat q5mins for up three doses         Continue These Medications      Instructions Start Date   allopurinol 300 MG tablet  Commonly known as: ZYLOPRIM   450 mg, Oral, Daily, 1 1/2       ARIPiprazole 15 MG tablet  Commonly known as: ABILIFY   7.5 mg, Oral, Daily      aspirin 81 MG EC tablet   81 mg, Oral, Daily      carvedilol 12.5 MG tablet  Commonly known as: COREG   6.25 mg, Oral, 2 Times Daily      clopidogrel 75 MG tablet  Commonly known as: PLAVIX   75 mg, Oral, Daily      cyclobenzaprine 10 MG tablet  Commonly known as: FLEXERIL   10 mg, Oral, 3 Times Daily PRN      DULoxetine 30 MG capsule  Commonly known as: CYMBALTA   30 mg, Oral, Every Morning      DULoxetine 30 MG capsule  Commonly known as: CYMBALTA   60 mg, Oral, Nightly      furosemide 40 MG tablet  Commonly known as: LASIX   40 mg, Oral, Daily      furosemide 40 MG tablet  Commonly known as: LASIX   40 mg, Oral, Daily PRN      isosorbide dinitrate 30 MG tablet  Commonly known as: ISORDIL   30 mg, Oral, Daily      lisinopril 5 MG tablet  Commonly known as: PRINIVIL,ZESTRIL   5 mg, Oral, Nightly      meloxicam 15 MG tablet  Commonly known as: MOBIC   15 mg, Oral, Daily PRN      omeprazole 20 MG capsule  Commonly known as: priLOSEC   20 mg, Oral, Daily      ondansetron 4 MG tablet  Commonly known as: ZOFRAN   Take 1 tablet by mouth Every 8 (Eight) Hours As Needed for nausea      prazosin 2 MG capsule  Commonly known as: MINIPRESS   8 mg, Oral, Nightly      rosuvastatin 40 MG tablet  Commonly known as: CRESTOR   40 mg, Oral, Daily      traZODone 100 MG tablet  Commonly known as: DESYREL   250 mg, Oral, Nightly         Stop These Medications    benzoyl peroxide 5 % external liquid  Generic drug: benzoyl peroxide            Discharge Diet: resume home diet    Activity at Discharge:   NWB RUE, ROM elbow, wrist, and hand per 's instructions.    Follow-up  Appointments  Follow up with Dr. Yin per his recommendation         John Alonso MD  11/04/22  10:13 EDT

## 2022-11-04 NOTE — PLAN OF CARE
Goal Outcome Evaluation:  Plan of Care Reviewed With: patient        Progress: no change  Outcome Evaluation: PT initial evaluation complete. Pt presents with mild generalized weakness, decreased functional activity tolerance, and impaired balance warranting further skilled acute PT services. Pt performed bed mobility with MinAx1, transfers with CGA and no AD, ambulated 225ft with no AD initially with CGA and progressing to SBA, and ascended/descended 1 step with CGA and no UE support. Pt limited d/t mild fatigue w/ SpO2 decline to a minimum of 91% on 2L O2 via NC. PT reviewed/demonstrated technique for car transfers. PT recommending return home with assist and  PT when medically appropriate.

## 2022-11-04 NOTE — PROGRESS NOTES
[facility]    Acute pain service Inpatient Progress Note    Patient Name: Shayne Neal  :  1955  MRN:  [id number]        Acute Pain  Service Inpatient Progress Note:    Analgesia:Good  Pain Score:5/10  LOC: alert and awake  Resp Status: room air  Cardiac: VS stable  Side Effects:None  Catheter Site:clean, dressing intact and dry  Cath type: peripheral nerve cath with ON Q  Volume: 1mL,5ml, 5ml InfuSystem Pump.  Dosing/Volume: ropivacaine 0.2%  Catheter Plan:Catheter to remain Insitu and Continue catheter infusion rate unchanged  Comments: The neuro assessment of the operative extremity includes the ability to do finger flexion and extension; includes the ability to do wrist flexion and extension, includes the ability to do elbow flexion and extension.  The neuro exam of the patient includes sensory function throughout the operative extremity.

## 2022-11-04 NOTE — THERAPY TREATMENT NOTE
Patient Name: Shayne Neal  : 1955    MRN: [id number]                              Today's Date: 2022       Admit Date: 11/3/2022    Visit Dx:     ICD-10-CM ICD-9-CM   1. Status post total replacement of right shoulder  Z96.611 V43.61     Patient Active Problem List   Diagnosis   • Glenohumeral arthritis, right   • Asthma   • COPD (chronic obstructive pulmonary disease) (HCC)   • Gout   • CAD (coronary artery disease)   • HTN (hypertension)   • NYDIA (obstructive sleep apnea)   • Status post total replacement of right shoulder   • Chronic systolic heart failure (CMS/HCC)     Past Medical History:   Diagnosis Date   • Asthma    • Chronic kidney disease    • Colon cancer (HCC)     colon cancer   • COPD (chronic obstructive pulmonary disease) (HCC)    • Coronary artery disease    • Diabetes mellitus (HCC)     diet controlled   • Gout    • Heart attack (HCC)    • History of transfusion     after knee surgery and with chemo, no reaction   • Hyperlipidemia    • Hypertension    • Neuropathy    • Sleep apnea     wears cpap with 2 liters at night   • Stroke (HCC)     , left sided weakness and periodic muscle jerking     Past Surgical History:   Procedure Laterality Date   • CARDIAC CATHETERIZATION      [facility] by Dr. Pastor / [facility]    • CATARACT EXTRACTION Bilateral    • CHOLECYSTECTOMY     • COLON RESECTION     • COLONOSCOPY     • CORONARY ARTERY BYPASS GRAFT      [facility]    • CORONARY STENT PLACEMENT     • INCISIONAL HERNIA REPAIR      abdomen   • TOTAL KNEE ARTHROPLASTY Left    • TOTAL SHOULDER ARTHROPLASTY Right 11/3/2022    Procedure: TOTAL SHOULDER ARTHROPLASTY - RIGHT;  Surgeon: Jose Raul Yin MD;  [facility];  Service: Orthopedics;  Laterality: Right;      General Information     Row Name 22 1329          OT Time and Intention    Document Type therapy note (daily note)  -MR     Mode of Treatment occupational therapy  -MR      Row Name 11/04/22 1329          General Information    Patient Profile Reviewed yes  -MR     Existing Precautions/Restrictions fall;right;non-weight bearing;shoulder;oxygen therapy device and L/min  Ultra Sling II w/o ABD pillow; RUE NWB; R interscalene nerve cath  -MR     Barriers to Rehab previous functional deficit  -MR     Row Name 11/04/22 1329          Cognition    Orientation Status (Cognition) oriented x 4  -MR     Row Name 11/04/22 1329          Safety Issues, Functional Mobility    Safety Issues Affecting Function (Mobility) insight into deficits/self-awareness;safety precaution awareness;awareness of need for assistance;safety precautions follow-through/compliance  -MR     Impairments Affecting Function (Mobility) strength;range of motion (ROM);shortness of breath;endurance/activity tolerance;balance  -MR           User Key  (r) = Recorded By, (t) = Taken By, (c) = Cosigned By    Initials Name Provider Type    MR Jill Li, OT Occupational Therapist                 Mobility/ADL's     Row Name 11/04/22 1330          Bed Mobility    Bed Mobility supine-sit;sit-supine  -MR     Supine-Sit Wright (Bed Mobility) minimum assist (75% patient effort);verbal cues;nonverbal cues (demo/gesture)  -MR     Sit-Supine [place] (Bed Mobility) contact guard;1 person assist;verbal cues  -MR     Bed Mobility, Safety Issues decreased use of arms for pushing/pulling  -MR     Assistive Device (Bed Mobility) head of bed elevated;bed rails  -MR     Row Name 11/04/22 1330          Transfers    Transfers sit-stand transfer  -MR     Comment, (Transfers) STS from EOB to upright to advance pants over hips  -MR     Row Name 11/04/22 1330          Sit-Stand Transfer    Sit-Stand Wright (Transfers) contact guard;verbal cues  -MR     Assistive Device (Sit-Stand Transfers) other (see comments)  w/o AD  -MR     Row Name 11/04/22 1330          Activities of Daily Living    BADL Assessment/Intervention upper body  dressing;bathing;lower body dressing  -MR     Row Name 11/04/22 1330          Mobility    Extremity Weight-bearing Status right upper extremity  -MR     Right Upper Extremity (Weight-bearing Status) non weight-bearing (NWB)   -MR     Row Name 11/04/22 1330          Bathing Assessment/Intervention    [place] Level (Bathing) bathing skills  -MR     Comment, (Bathing) Pt and spouse re-educated on R axilla hygiene and waiting to shower after nerve cath has been d/c.  -MR     Row Name 11/04/22 1330          Upper Body Dressing Assessment/Training    [place] Level (Upper Body Dressing) doff;don;pull-over garment;minimum assist (75% patient effort);nonverbal cues (demo/gesture);verbal cues  -MR     Position (Upper Body Dressing) edge of bed sitting  -MR     Comment, (Upper Body Dressing) Pt and spouse re-educated on R shoulder precautions, R axilla hygiene, izabel dressing, sling management and nerve cath management to prevent accidental dislodgement during ADLs. MaxA for doffing sling, Thomas w/ v/c to lee ann over the head shirt. Education provided for nerve cath management. Spouse donned sling w/ CGA and v/c.  -MR     Row Name 11/04/22 1330          Lower Body Dressing Assessment/Training    [place] Level (Lower Body Dressing) don;pants/bottoms;contact guard assist  -MR     Position (Lower Body Dressing) edge of bed sitting;unsupported standing  -MR           User Key  (r) = Recorded By, (t) = Taken By, (c) = Cosigned By    Initials Name Provider Type    Jill Carreon OT Occupational Therapist               Obj/Interventions     Row Name 11/04/22 1333          Elbow/Forearm (Therapeutic Exercise)    Elbow/Forearm (Therapeutic Exercise) AROM (active range of motion)  -MR     Elbow/Forearm AROM (Therapeutic Exercise) right;flexion;extension;supination;pronation;10 repetitions  -MR     Row Name 11/04/22 1333          Wrist (Therapeutic Exercise)    Wrist (Therapeutic Exercise) AROM (active range of motion)   -MR     Wrist AROM (Therapeutic Exercise) right;flexion;extension;10 repetitions  -MR     Row Name 11/04/22 2693          Hand (Therapeutic Exercise)    Hand (Therapeutic Exercise) AROM (active range of motion)  -MR     Hand AROM/AAROM (Therapeutic Exercise) right;finger flexion;finger extension;10 repetitions  -MR     Row Name 11/04/22 1333          Motor Skills    Therapeutic Exercise elbow/forearm;wrist;hand  -MR     Row Name 11/04/22 1333          Balance    Balance Assessment sitting static balance;sitting dynamic balance;standing static balance;standing dynamic balance  -MR     Static Sitting Balance independent  -MR     Dynamic Sitting Balance supervision  -MR     Position, Sitting Balance unsupported;sitting edge of bed  -MR     Static Standing Balance standby assist  -MR     Dynamic Standing Balance contact guard  -MR     Position/Device Used, Standing Balance unsupported  -MR     Balance Interventions standing;sitting;sit to stand;supported;static;dynamic;minimal challenge;occupation based/functional task  -MR           User Key  (r) = Recorded By, (t) = Taken By, (c) = Cosigned By    Initials Name Provider Type    MR Jill Li OT Occupational Therapist               Goals/Plan    No documentation.                Clinical Impression     Row Name 11/04/22 8189          Pain Assessment    Pretreatment Pain Rating 8/10  -MR     Posttreatment Pain Rating 8/10  -MR     Pain Location - Side/Orientation Right  -MR     Pain Location generalized  -MR     Pain Location - shoulder  -MR     Pre/Posttreatment Pain Comment RN notified  -MR     Pain Intervention(s) Ambulation/increased activity;Repositioned;Cold applied  -MR     Row Name 11/04/22 0266          Plan of Care Review    Plan of Care Reviewed With patient;spouse  -MR     Progress improving  -MR     Outcome Evaluation OT re-educated pt on R shoulder precautions, izabel-dressing technique, sling management, nerve cath management to prevent accidental  dislodgement, R axilla hygiene and HEP w/in surgeon parameters. Pt completed bed mobility w/ Thomas supine to sit, CGA for sit to supine. OT doffed sling w/ MaxA providing education to spouse. Pt demo good effort w/ HEP w/in surgeon parameters, pt and spouse demo understanding. Thomas for UB dressing w/ assist for nerve cath. CGA for LB dressing. O2 was doffed during dressing tasks for approx 2 mins, pt desat to 86%. O2 via NC placed on pt and quick recovery noted. Spouse demo ability and understanding to lee ann sling w/ CGA. Pt returned to supine w/ CGA. O2 left off pt w/ sats being 92%, RN notified. Pt is cleared by OT for d/c when medically appropriate.  -MR     Row Name 11/04/22 4095          Therapy Plan Review/Discharge Plan (OT)    Anticipated Discharge Disposition (OT) home with assist  -MR     Row Name 11/04/22 1337          Vital Signs    Pre SpO2 (%) 95  -MR     O2 Delivery Pre Treatment nasal cannula  -MR     Intra SpO2 (%) 87  -MR     O2 Delivery Intra Treatment room air  -MR     Post SpO2 (%) 92  -MR     O2 Delivery Post Treatment room air  -MR     Pre Patient Position Supine  -MR     Intra Patient Position Standing  -MR     Post Patient Position Supine  -MR     Row Name 11/04/22 2374          Positioning and Restraints    Pre-Treatment Position in bed  -MR     Post Treatment Position bed  -MR     In Bed notified nsg;fowlers;side rails up x2;call light within reach;encouraged to call for assist;exit alarm on;with family/caregiver  RUE in sling; Ice applied to the R shoulder  -MR           User Key  (r) = Recorded By, (t) = Taken By, (c) = Cosigned By    Initials Name Provider Type     Jill Li, OT Occupational Therapist               Outcome Measures     Row Name 11/04/22 2253          How much help from another is currently needed...    Putting on and taking off regular lower body clothing? 2  -MR     Bathing (including washing, rinsing, and drying) 2  -MR     Toileting (which includes using toilet  bed pan or urinal) 2  -MR     Putting on and taking off regular upper body clothing 2  -MR     Taking care of personal grooming (such as brushing teeth) 3  -MR     Eating meals 3  -MR     AM-PAC 6 Clicks Score (OT) 14  -MR     Row Name 11/04/22 0932          How much help from another person do you currently need...    Turning from your back to your side while in flat bed without using bedrails? 3  -LH     Moving from lying on back to sitting on the side of a flat bed without bedrails? 3  -LH     Moving to and from a bed to a chair (including a wheelchair)? 3  -LH     Standing up from a chair using your arms (e.g., wheelchair, bedside chair)? 3  -LH     Climbing 3-5 steps with a railing? 3  -LH     To walk in hospital room? 3  -     AM-PAC 6 Clicks Score (PT) 18  -     Highest level of mobility 6 --> Walked 10 steps or more  -     Row Name 11/04/22 1340 11/04/22 0932       Functional Assessment    Outcome Measure Options AM-PAC 6 Clicks Daily Activity (OT)  - AM-PAC 6 Clicks Basic Mobility (PT)  -          User Key  (r) = Recorded By, (t) = Taken By, (c) = Cosigned By    Initials Name Provider Type     George Hayes, PT Physical Therapist    Jill Carreon, OT Occupational Therapist                Occupational Therapy Education     Title: PT OT SLP Therapies (Done)     Topic: Occupational Therapy (Done)     Point: ADL training (Done)     Description:   Instruct learner(s) on proper safety adaptation and remediation techniques during self care or transfers.   Instruct in proper use of assistive devices.              Learning Progress Summary           Patient Acceptance, E, VU by MR at 11/4/2022 1340    Acceptance, E, VU by MR at 11/3/2022 1557   Significant Other Acceptance, E, VU by MR at 11/4/2022 1340    Acceptance, E, VU by MR at 11/3/2022 1557                   Point: Home exercise program (Done)     Description:   Instruct learner(s) on appropriate technique for monitoring, assisting and/or  progressing therapeutic exercises/activities.              Learning Progress Summary           Patient Acceptance, E, VU by MR at 11/4/2022 1340    Acceptance, E, VU by MR at 11/3/2022 1557   Significant Other Acceptance, E, VU by MR at 11/4/2022 1340    Acceptance, E, VU by MR at 11/3/2022 1557                   Point: Precautions (Done)     Description:   Instruct learner(s) on prescribed precautions during self-care and functional transfers.              Learning Progress Summary           Patient Acceptance, E, VU by MR at 11/4/2022 1340    Acceptance, E, VU by MR at 11/3/2022 1557   Significant Other Acceptance, E, VU by MR at 11/4/2022 1340    Acceptance, E, VU by MR at 11/3/2022 1557                   Point: Body mechanics (Done)     Description:   Instruct learner(s) on proper positioning and spine alignment during self-care, functional mobility activities and/or exercises.              Learning Progress Summary           Patient Acceptance, E, VU by MR at 11/4/2022 1340    Acceptance, E, VU by MR at 11/3/2022 1557   Significant Other Acceptance, E, VU by MR at 11/4/2022 1340    Acceptance, E, VU by MR at 11/3/2022 1557                               User Key     Initials Effective Dates Name Provider Type Discipline    MR 09/22/22 -  Jill Li OT Occupational Therapist OT              OT Recommendation and Plan  Planned Therapy Interventions (OT): activity tolerance training, adaptive equipment training, BADL retraining, functional balance retraining, IADL retraining, occupation/activity based interventions, patient/caregiver education/training, transfer/mobility retraining, strengthening exercise, ROM/therapeutic exercise  Therapy Frequency (OT): daily  Plan of Care Review  Plan of Care Reviewed With: patient, spouse  Progress: improving  Outcome Evaluation: OT re-educated pt on R shoulder precautions, izabel-dressing technique, sling management, nerve cath management to prevent accidental dislodgement,  R axilla hygiene and HEP w/in surgeon parameters. Pt completed bed mobility w/ Thomas supine to sit, CGA for sit to supine. OT doffed sling w/ MaxA providing education to spouse. Pt demo good effort w/ HEP w/in surgeon parameters, pt and spouse demo understanding. Thomas for UB dressing w/ assist for nerve cath. CGA for LB dressing. O2 was doffed during dressing tasks for approx 2 mins, pt desat to 86%. O2 via NC placed on pt and quick recovery noted. Spouse demo ability and understanding to lee ann sling w/ CGA. Pt returned to supine w/ CGA. O2 left off pt w/ sats being 92%, RN notified. Pt is cleared by OT for d/c when medically appropriate.     Time Calculation:    Time Calculation- OT     Row Name 11/04/22 1341             Time Calculation- OT    OT Start Time 1121  -MR      OT Received On 11/04/22  -MR      OT Goal Re-Cert Due Date 11/13/22  -MR         Timed Charges    22287 - OT Therapeutic Exercise Minutes 6  -MR      78919 - OT Therapeutic Activity Minutes 2  -MR      03909 - OT Self Care/Mgmt Minutes 15  -MR         Total Minutes    Timed Charges Total Minutes 23  -MR       Total Minutes 23  -MR            User Key  (r) = Recorded By, (t) = Taken By, (c) = Cosigned By    Initials Name Provider Type     Jill Li OT Occupational Therapist              Therapy Charges for Today     Code Description Service Date Service Provider Modifiers Qty    74506881764 HC OT EVAL MOD COMPLEXITY 4 11/3/2022 Jill Li OT GO 1    30455990174 HC OT SELF CARE/MGMT/TRAIN EA 15 MIN 11/3/2022 Jill Li OT GO 1    58420850819 HC OT THER PROC EA 15 MIN 11/3/2022 Jill Li OT GO 1    65759872178 HC OT THER PROC EA 15 MIN 11/4/2022 Jill Li OT GO 1    58000096416 HC OT SELF CARE/MGMT/TRAIN EA 15 MIN 11/4/2022 Jill Li OT GO 1               Jill Li OT  11/4/2022

## 2022-11-04 NOTE — THERAPY EVALUATION
Patient Name: Shayne Neal  : 1955    MRN: [id number]                              Today's Date: 2022       Admit Date: 11/3/2022    Visit Dx: No diagnosis found.  Patient Active Problem List   Diagnosis   • Glenohumeral arthritis, right   • Asthma   • COPD (chronic obstructive pulmonary disease) (HCC)   • Gout   • CAD (coronary artery disease)   • HTN (hypertension)   • NYDIA (obstructive sleep apnea)   • Status post total replacement of right shoulder     Past Medical History:   Diagnosis Date   • Asthma    • Chronic kidney disease    • Colon cancer (HCC)     colon cancer   • COPD (chronic obstructive pulmonary disease) (HCC)    • Coronary artery disease    • Diabetes mellitus (HCC)     diet controlled   • Gout    • Heart attack (HCC)    • History of transfusion     after knee surgery and with chemo, no reaction   • Hyperlipidemia    • Hypertension    • Neuropathy    • Sleep apnea     wears cpap with 2 liters at night   • Stroke ([facility])     , left sided weakness and periodic muscle jerking     Past Surgical History:   Procedure Laterality Date   • CARDIAC CATHETERIZATION      [facility] by Dr. Pastor / [facility]    • CATARACT EXTRACTION Bilateral    • CHOLECYSTECTOMY     • COLON RESECTION     • COLONOSCOPY     • CORONARY ARTERY BYPASS GRAFT      [facility]    • CORONARY STENT PLACEMENT     • INCISIONAL HERNIA REPAIR      abdomen   • TOTAL KNEE ARTHROPLASTY Left       General Information     Row Name 22          Physical Therapy Time and Intention    Document Type therapy note (daily note)  -     Mode of Treatment physical therapy;individual therapy  -     Row Name 22          General Information    Patient Profile Reviewed yes  -     Prior Level of Function independent:;community mobility;gait;all household mobility;transfer  Pt reports he is independent with all mobility at baseline with no AD use, although has RW and cane.  -      Existing Precautions/Restrictions fall;right;non-weight bearing;shoulder;oxygen therapy device and L/min  Ultra Sling II w/o ABD pillow; RUE NWB; R interscalene nerve cath  -     Barriers to Rehab previous functional deficit  -     Row Name 11/04/22 0914          Living Environment    People in Home spouse  -     Row Name 11/04/22 0914          Home Main Entrance    Number of Stairs, Main Entrance none  -     Stair Railings, Main Entrance none  -     Row Name 11/04/22 0914          Stairs Within Home, Primary    Number of Stairs, Within Home, Primary none  -     Row Name 11/04/22 0914          Cognition    Orientation Status (Cognition) oriented x 4  -     Row Name 11/04/22 0914          Safety Issues, Functional Mobility    Safety Issues Affecting Function (Mobility) insight into deficits/self-awareness;safety precaution awareness;safety precautions follow-through/compliance  -     Impairments Affecting Function (Mobility) strength;range of motion (ROM);shortness of breath;endurance/activity tolerance;balance  -           User Key  (r) = Recorded By, (t) = Taken By, (c) = Cosigned By    Initials Name Provider Type     George Hayes, PT Physical Therapist               Mobility     Row Name 11/04/22 0918          Bed Mobility    Bed Mobility supine-sit;sit-supine  -     Supine-Sit [place] (Bed Mobility) minimum assist (75% patient effort);1 person assist;verbal cues  -     Sit-Supine [place] (Bed Mobility) contact guard;1 person assist;verbal cues  -     Assistive Device (Bed Mobility) head of bed elevated;bed rails  -     Comment, (Bed Mobility) Pt performed bed mobility to the R side of the bed requiring assist for trunk control in order to maintain RUE NWB precautions. Pt able to perform sit to supine with no assist.  -     Row Name 11/04/22 0918          Transfers    Comment, (Transfers) Pt performed STS from EOB with no AD, no gross LOB noted. PT demonstrated  proper car transfers for safety.  -     Row Name 11/04/22 0918          Sit-Stand Transfer    Sit-Stand [place] (Transfers) contact guard;verbal cues  -[facility] Name 11/04/22 0918          Gait/Stairs (Locomotion)    [place] Level (Gait) contact guard;verbal cues  -     Distance in Feet (Gait) 225  -     Deviations/Abnormal Patterns (Gait) base of support, wide;oliverio decreased;gait speed decreased;stride length decreased  -     Bilateral Gait Deviations forward flexed posture  -     [place] Level (Stairs) contact guard;verbal cues  -     Number of Steps (Stairs) 1  -     Ascending Technique (Stairs) step-to-step  -     Descending Technique (Stairs) step-to-step  -     Comment, (Gait/Stairs) Pt demonstrated step through gait pattern with mild decrease in gait speed and stride length, with minimal instability. Pt with mild increase in SOA requiring one standing rest break with SpO2 decline to minimum of 91% on 2L O2 via NC. Pt ascended/descended 1 step with no UE support with good balanace and safety.  -[facility] Name 11/04/22 0918          Mobility    Extremity Weight-bearing Status right upper extremity  -     Right Upper Extremity (Weight-bearing Status) non weight-bearing (NWB)   -           User Key  (r) = Recorded By, (t) = Taken By, (c) = Cosigned By    Initials Name Provider Type     George Hayes, PT Physical Therapist               Obj/Interventions     [facility] Name 11/04/22 0925          Range of Motion Comprehensive    General Range of Motion bilateral lower extremity ROM WFL  -[facility] Name 11/04/22 0925          Strength Comprehensive (MMT)    General Manual Muscle Testing (MMT) Assessment lower extremity strength deficits identified  -     Comment, General Manual Muscle Testing (MMT) Assessment BLE strength grossly 4+/5 via functional assessment.  -     Row Name 11/04/22 0925          Motor Skills    Motor Skills coordination  -     Coordination  WFL;bilateral;lower extremity  -[facility] Name 11/04/22 0925          Balance    Balance Assessment sitting static balance;sitting dynamic balance;standing static balance;standing dynamic balance  -     Static Sitting Balance independent  -     Dynamic Sitting Balance supervision  -     Position, Sitting Balance unsupported;sitting edge of bed  -     Static Standing Balance standby assist  -     Dynamic Standing Balance contact guard  -     Position/Device Used, Standing Balance unsupported  -     Comment, Balance Pt minimal instability during ambulation with no AD, no gross LOB noted throughout.  -[facility] Name 11/04/22 0925          Sensory Assessment (Somatosensory)    Sensory Assessment (Somatosensory) LE sensation intact  -           User Key  (r) = Recorded By, (t) = Taken By, (c) = Cosigned By    Initials Name Provider Type     George Hayes, PT Physical Therapist               Goals/Plan     [facility] Name 11/04/22 0931          Bed Mobility Goal 1 (PT)    Activity/Assistive Device (Bed Mobility Goal 1, PT) sit to supine/supine to sit  -     [place] Level/Cues Needed (Bed Mobility Goal 1, PT) independent  -     Time Frame (Bed Mobility Goal 1, PT) long term goal (LTG);10 days  -[facility] Name 11/04/22 0931          Transfer Goal 1 (PT)    Activity/Assistive Device (Transfer Goal 1, PT) sit-to-stand/stand-to-sit;bed-to-chair/chair-to-bed  -     [place] Level/Cues Needed (Transfer Goal 1, PT) independent  -     Time Frame (Transfer Goal 1, PT) long term goal (LTG);10 days  -[facility] Name 11/04/22 0931          Gait Training Goal 1 (PT)    Activity/Assistive Device (Gait Training Goal 1, PT) gait (walking locomotion)  -     [place] Level (Gait Training Goal 1, PT) independent  -     Distance (Gait Training Goal 1, PT) 500  -     Time Frame (Gait Training Goal 1, PT) long term goal (LTG);10 days  -[facility] Name 11/04/22 0931          Stairs Goal 1 (PT)     Activity/Assistive Device (Stairs Goal 1, PT) ascending stairs;descending stairs  -     [place] Level/Cues Needed (Stairs Goal 1, PT) independent  -     Number of Stairs (Stairs Goal 1, PT) 1  -     Time Frame (Stairs Goal 1, PT) long term goal (LTG);10 days  -     Row Name 11/04/22 0931          Therapy Assessment/Plan (PT)    Planned Therapy Interventions (PT) balance training;bed mobility training;gait training;home exercise program;patient/family education;stair training;strengthening;transfer training  -           User Key  (r) = Recorded By, (t) = Taken By, (c) = Cosigned By    Initials Name Provider Type     George Hayes, PT Physical Therapist               Clinical Impression     Row Name 11/04/22 0926          Pain    Pretreatment Pain Rating 6/10  -     Posttreatment Pain Rating 6/10  -     Pain Location - Side/Orientation Right  -     Pain Location generalized  -     Pain Location - shoulder  -     Pain Intervention(s) Repositioned;Ambulation/increased activity  -     Row Name 11/04/22 0926          Plan of Care Review    Plan of Care Reviewed With patient  -     Progress no change  -     Outcome Evaluation PT initial evaluation complete. Pt presents with mild generalized weakness, decreased functional activity tolerance, and impaired balance warranting further skilled acute PT services. Pt performed bed mobility with MinAx1, transfers with CGA and no AD, ambulated 225ft with no AD initially with CGA and progressing to SBA, and ascended/descended 1 step with CGA and no UE support. Pt limited d/t mild fatigue w/ SpO2 decline to a minimum of 91% on 2L O2 via NC. PT reviewed/demonstrated technique for car transfers. PT recommending return home with assist and  PT when medically appropriate.  -     Row Name 11/04/22 0926          Therapy Assessment/Plan (PT)    Patient/Family Therapy Goals Statement (PT) Return home.  -     Rehab Potential (PT) good, to achieve  stated therapy goals  -     Criteria for Skilled Interventions Met (PT) yes;meets criteria;skilled treatment is necessary  -     Therapy Frequency (PT) daily  -     Predicted Duration of Therapy Intervention (PT) 2 weeks  -     Row Name 11/04/22 0926          Vital Signs    Pre Systolic BP Rehab 125  -LH     Pre Treatment Diastolic BP 99  -LH     Pretreatment Heart Rate (beats/min) 84  -LH     Intratreatment Heart Rate (beats/min) 114  -LH     Posttreatment Heart Rate (beats/min) 107  -LH     Pre SpO2 (%) 93  -LH     O2 Delivery Pre Treatment nasal cannula  -LH     Intra SpO2 (%) 91  -LH     O2 Delivery Intra Treatment nasal cannula  -LH     Post SpO2 (%) 95  -LH     O2 Delivery Post Treatment nasal cannula  -LH     Pre Patient Position Supine  -LH     Intra Patient Position Standing  -LH     Post Patient Position Supine  -     Row Name 11/04/22 0926          Positioning and Restraints    Pre-Treatment Position in bed  -LH     Post Treatment Position bed  -LH     In Bed fowlers;call light within reach;encouraged to call for assist;with family/caregiver  -           User Key  (r) = Recorded By, (t) = Taken By, (c) = Cosigned By    Initials Name Provider Type     George Hayes, PT Physical Therapist               Outcome Measures     Row Name 11/04/22 0932          How much help from another person do you currently need...    Turning from your back to your side while in flat bed without using bedrails? 3  -LH     Moving from lying on back to sitting on the side of a flat bed without bedrails? 3  -LH     Moving to and from a bed to a chair (including a wheelchair)? 3  -LH     Standing up from a chair using your arms (e.g., wheelchair, bedside chair)? 3  -LH     Climbing 3-5 steps with a railing? 3  -LH     To walk in hospital room? 3  -LH     AM-PAC 6 Clicks Score (PT) 18  -     Highest level of mobility 6 --> Walked 10 steps or more  -     Row Name 11/04/22 0932          Functional Assessment     Outcome Measure Options AM-PAC 6 Clicks Basic Mobility (PT)  -           User Key  (r) = Recorded By, (t) = Taken By, (c) = Cosigned By    Initials Name Provider Type     George Hayes, PT Physical Therapist                             Physical Therapy Education     Title: PT OT SLP Therapies (Done)     Topic: Physical Therapy (Done)     Point: Mobility training (Done)     Learning Progress Summary           Patient Acceptance, E, VU by  at 11/4/2022 0932                   Point: Home exercise program (Done)     Learning Progress Summary           Patient Acceptance, E, VU by  at 11/4/2022 0932                   Point: Body mechanics (Done)     Learning Progress Summary           Patient Acceptance, E, VU by  at 11/4/2022 0932                   Point: Precautions (Done)     Learning Progress Summary           Patient Acceptance, E, VU by  at 11/4/2022 0932                               User Key     Initials Effective Dates Name Provider Type [facility] 09/21/21 -  George Hayes, PT Physical Therapist PT              PT Recommendation and Plan  Planned Therapy Interventions (PT): balance training, bed mobility training, gait training, home exercise program, patient/family education, stair training, strengthening, transfer training  Plan of Care Reviewed With: patient  Progress: no change  Outcome Evaluation: PT initial evaluation complete. Pt presents with mild generalized weakness, decreased functional activity tolerance, and impaired balance warranting further skilled acute PT services. Pt performed bed mobility with MinAx1, transfers with CGA and no AD, ambulated 225ft with no AD initially with CGA and progressing to SBA, and ascended/descended 1 step with CGA and no UE support. Pt limited d/t mild fatigue w/ SpO2 decline to a minimum of 91% on 2L O2 via NC. PT reviewed/demonstrated technique for car transfers. PT recommending return home with assist and  PT when medically  appropriate.     Time Calculation:    PT Charges     Row Name 11/04/22 0933             Time Calculation    Start Time 0841  -      PT Received On 11/04/22  -      PT Goal Re-Cert Due Date 11/14/22  -         Untimed Charges    PT Eval/Re-eval Minutes 47  -LH         Total Minutes    Untimed Charges Total Minutes 47  -       Total Minutes 47  -LH            User Key  (r) = Recorded By, (t) = Taken By, (c) = Cosigned By    Initials Name Provider Type     George Hayes, PT Physical Therapist              Therapy Charges for Today     Code Description Service Date Service Provider Modifiers Qty    13970115322 HC PT EVAL MOD COMPLEXITY 4 11/4/2022 George Hayes, PT GP 1          PT G-Codes  Outcome Measure Options: AM-PAC 6 Clicks Basic Mobility (PT)  AM-PAC 6 Clicks Score (PT): 18  AM-PAC 6 Clicks Score (OT): 14    George Hayes PT  11/4/2022

## 2022-11-04 NOTE — PLAN OF CARE
Problem: Adult Inpatient Plan of Care  Goal: Plan of Care Review  Outcome: Ongoing, Progressing  Flowsheets (Taken 11/4/2022 1628)  Progress: improving  Plan of Care Reviewed With:   patient   spouse  Goal: Patient-Specific Goal (Individualized)  Outcome: Ongoing, Progressing  Goal: Absence of Hospital-Acquired Illness or Injury  Outcome: Ongoing, Progressing  Intervention: Identify and Manage Fall Risk  Recent Flowsheet Documentation  Taken 11/4/2022 1420 by Yamile Yates RN  Safety Promotion/Fall Prevention:   activity supervised   assistive device/personal items within reach   safety round/check completed  Taken 11/4/2022 1210 by Yamile Yates RN  Safety Promotion/Fall Prevention:   activity supervised   assistive device/personal items within reach   safety round/check completed  Taken 11/4/2022 1029 by Yamile Yates RN  Safety Promotion/Fall Prevention:   activity supervised   assistive device/personal items within reach   safety round/check completed  Taken 11/4/2022 0833 by Yamile Yates RN  Safety Promotion/Fall Prevention:   activity supervised   assistive device/personal items within reach   safety round/check completed  Intervention: Prevent Skin Injury  Recent Flowsheet Documentation  Taken 11/4/2022 1420 by Yamile Yates RN  Body Position: legs elevated  Taken 11/4/2022 1210 by Yamile Yates RN  Body Position: legs elevated  Taken 11/4/2022 1029 by Yamile Yates RN  Body Position: sitting up in bed  Taken 11/4/2022 0833 by Yamile Yates RN  Body Position: sitting up in bed  Intervention: Prevent and Manage VTE (Venous Thromboembolism) Risk  Recent Flowsheet Documentation  Taken 11/4/2022 1420 by Yamile Yates RN  Activity Management: up in chair  Taken 11/4/2022 1210 by Yamile Yates RN  Activity Management: up in chair  VTE Prevention/Management:   bilateral   sequential compression devices off  Taken 11/4/2022 1029 by Yamile Yates RN  Activity Management: activity  adjusted per tolerance  VTE Prevention/Management:   bilateral   sequential compression devices off  Taken 11/4/2022 0833 by Yamile Yates RN  Activity Management: activity adjusted per tolerance  VTE Prevention/Management:   bilateral   sequential compression devices on  Range of Motion: active ROM (range of motion) encouraged  Intervention: Prevent Infection  Recent Flowsheet Documentation  Taken 11/4/2022 1420 by Yamile Yates RN  Infection Prevention:   hand hygiene promoted   rest/sleep promoted  Taken 11/4/2022 1210 by Yamile Yates RN  Infection Prevention:   hand hygiene promoted   rest/sleep promoted  Taken 11/4/2022 1029 by Yamile Yates RN  Infection Prevention:   hand hygiene promoted   rest/sleep promoted  Taken 11/4/2022 0833 by Yamile Yates RN  Infection Prevention:   hand hygiene promoted   rest/sleep promoted  Goal: Optimal Comfort and Wellbeing  Outcome: Ongoing, Progressing  Intervention: Monitor Pain and Promote Comfort  Recent Flowsheet Documentation  Taken 11/4/2022 1029 by Yamile Yates RN  Pain Management Interventions:   pain pump in use   cold applied  Taken 11/4/2022 0833 by Yamile Yates RN  Pain Management Interventions:   pain pump in use   pillow support provided   see MAR   cold applied  Intervention: Provide Person-Centered Care  Recent Flowsheet Documentation  Taken 11/4/2022 1420 by Yamile Yates RN  Trust Relationship/Rapport: care explained  Taken 11/4/2022 1210 by Yamile Yates RN  Trust Relationship/Rapport: care explained  Taken 11/4/2022 1029 by Yamile Yates RN  Trust Relationship/Rapport: care explained  Taken 11/4/2022 0833 by Yamile Yates RN  Trust Relationship/Rapport:   care explained   choices provided   questions answered   questions encouraged   reassurance provided  Goal: Readiness for Transition of Care  Outcome: Ongoing, Progressing     Problem: Fall Injury Risk  Goal: Absence of Fall and Fall-Related Injury  Outcome: Ongoing,  Progressing  Intervention: Identify and Manage Contributors  Recent Flowsheet Documentation  Taken 11/4/2022 1420 by Yamile Yates RN  Self-Care Promotion: independence encouraged  Taken 11/4/2022 1210 by Yamile Yates RN  Self-Care Promotion: independence encouraged  Taken 11/4/2022 1029 by Yamile Yates RN  Self-Care Promotion: independence encouraged  Taken 11/4/2022 0833 by Yamile Yates RN  Medication Review/Management: medications reviewed  Self-Care Promotion: independence encouraged  Intervention: Promote Injury-Free Environment  Recent Flowsheet Documentation  Taken 11/4/2022 1420 by Yamile Yates RN  Safety Promotion/Fall Prevention:   activity supervised   assistive device/personal items within reach   safety round/check completed  Taken 11/4/2022 1210 by Yamile Yates RN  Safety Promotion/Fall Prevention:   activity supervised   assistive device/personal items within reach   safety round/check completed  Taken 11/4/2022 1029 by Yamile Yates RN  Safety Promotion/Fall Prevention:   activity supervised   assistive device/personal items within reach   safety round/check completed  Taken 11/4/2022 0833 by Yamile Yates RN  Safety Promotion/Fall Prevention:   activity supervised   assistive device/personal items within reach   safety round/check completed     Problem: Hypertension Comorbidity  Goal: Blood Pressure in Desired Range  Outcome: Ongoing, Progressing  Intervention: Maintain Blood Pressure Management  Recent Flowsheet Documentation  Taken 11/4/2022 0833 by Yamile Yates RN  Medication Review/Management: medications reviewed     Problem: Adjustment to Surgery (Shoulder Arthroplasty)  Goal: Optimal Coping  Outcome: Ongoing, Progressing     Problem: Bleeding (Shoulder Arthroplasty)  Goal: Absence of Bleeding  Outcome: Ongoing, Progressing  Intervention: Monitor and Manage Bleeding  Recent Flowsheet Documentation  Taken 11/4/2022 0833 by Yamile Yates RN  Bleeding  Management: dressing monitored     Problem: Bowel Motility Impaired (Shoulder Arthroplasty)  Goal: Effective Bowel Elimination  Outcome: Ongoing, Progressing     Problem: Fluid and Electrolyte Imbalance (Shoulder Arthroplasty)  Goal: Fluid and Electrolyte Balance  Outcome: Ongoing, Progressing     Problem: Functional Ability Impaired (Shoulder Arthroplasty)  Goal: Optimal Functional Ability  Outcome: Ongoing, Progressing  Intervention: Promote Optimal Functional Status  Recent Flowsheet Documentation  Taken 11/4/2022 1420 by Yamile Yates RN  Activity Management: up in chair  Self-Care Promotion: independence encouraged  Taken 11/4/2022 1210 by Yamile Yates RN  Activity Management: up in chair  Self-Care Promotion: independence encouraged  Taken 11/4/2022 1029 by Yamile Yates RN  Activity Management: activity adjusted per tolerance  Self-Care Promotion: independence encouraged  Taken 11/4/2022 0833 by Yamile Yates RN  Activity Management: activity adjusted per tolerance  Self-Care Promotion: independence encouraged     Problem: Infection (Shoulder Arthroplasty)  Goal: Absence of Infection Signs and Symptoms  Outcome: Ongoing, Progressing  Intervention: Prevent or Manage Infection  Recent Flowsheet Documentation  Taken 11/4/2022 0833 by Yamile Yates RN  Infection Management: aseptic technique maintained     Problem: Neurovascular Compromise (Shoulder Arthroplasty)  Goal: Intact Neurovascular Status  Outcome: Ongoing, Progressing     Problem: Ongoing Anesthesia Effects (Shoulder Arthroplasty)  Goal: Anesthesia/Sedation Recovery  Outcome: Ongoing, Progressing  Intervention: Optimize Anesthesia Recovery  Recent Flowsheet Documentation  Taken 11/4/2022 1420 by Yamile Yates RN  Safety Promotion/Fall Prevention:   activity supervised   assistive device/personal items within reach   safety round/check completed  Taken 11/4/2022 1210 by Yamile Yates RN  Safety Promotion/Fall Prevention:   activity  supervised   assistive device/personal items within reach   safety round/check completed  Taken 11/4/2022 1029 by Yamile Yates RN  Safety Promotion/Fall Prevention:   activity supervised   assistive device/personal items within reach   safety round/check completed  Taken 11/4/2022 0833 by Yamile Yates RN  Stabilization Measures: airway opened  Safety Promotion/Fall Prevention:   activity supervised   assistive device/personal items within reach   safety round/check completed  Administration (IS): self-administered     Problem: Pain (Shoulder Arthroplasty)  Goal: Acceptable Pain Control  Outcome: Ongoing, Progressing  Intervention: Prevent or Manage Pain  Recent Flowsheet Documentation  Taken 11/4/2022 1029 by Yamile Yates RN  Pain Management Interventions:   pain pump in use   cold applied  Taken 11/4/2022 0833 by Yamile Yates RN  Pain Management Interventions:   pain pump in use   pillow support provided   see MAR   cold applied  Diversional Activities: television     Problem: Postoperative Nausea and Vomiting (Shoulder Arthroplasty)  Goal: Nausea and Vomiting Relief  Outcome: Ongoing, Progressing     Problem: Postoperative Urinary Retention (Shoulder Arthroplasty)  Goal: Effective Urinary Elimination  Outcome: Ongoing, Progressing   Goal Outcome Evaluation:  Plan of Care Reviewed With: patient, spouse      Pt discharged home to continue PT. Orthopedic follow up made. Pt vitals WNL. Oxygen saturations slightly low today. Nerve block stopped for one hour to see if any improvements of oxygen. Saturations did not change. Nerve block was turned back one due to increased pain. Block CDI at discharge. Pt and spouse given verbal and written instruction on block. Pt ambulatory on the unit. Arm sling in place. Voiding well. Armpit pain alleviated with MAR. No other observations. Pt sent home with belongings and prescriptions sent to pharmacy.   Progress: improving

## 2022-11-04 NOTE — CASE MANAGEMENT/SOCIAL WORK
Continued Stay Note  [facility]     Patient Name: Shayne Neal  MRN: [id number]  Today's Date: 11/4/2022    Admit Date: 11/3/2022    Plan: Home   Discharge Plan     Row Name 11/04/22 1112       Plan    Plan Home    Patient/Family in Agreement with Plan yes    Plan Comments I met with Mr. Neal at the bedside. He wears 2L NC of oxygen at night and as needed during the day. His wife states that if he did need oxygen to go home her sister or someone could bring a tank to them. Mr. Neal wife will transport him home at that time of discharge. No discharge needs identified.    Final Discharge Disposition Code 01 - home or self-care               Discharge Codes    No documentation.               Expected Discharge Date and Time     Expected Discharge Date Expected Discharge Time    Nov 11, 2022             Seferino Vergara RN

## 2022-11-04 NOTE — PLAN OF CARE
Goal Outcome Evaluation:  Plan of Care Reviewed With: patient, spouse        Progress: improving  Outcome Evaluation: OT re-educated pt on R shoulder precautions, izabel-dressing technique, sling management, nerve cath management to prevent accidental dislodgement, R axilla hygiene and HEP w/in surgeon parameters. Pt completed bed mobility w/ Thomas supine to sit, CGA for sit to supine. OT doffed sling w/ MaxA providing education to spouse. Pt demo good effort w/ HEP w/in surgeon parameters, pt and spouse demo understanding. Thomas for UB dressing w/ assist for nerve cath. CGA for LB dressing. O2 was doffed during dressing tasks for approx 2 mins, pt desat to 86%. O2 via NC placed on pt and quick recovery noted. Spouse demo ability and understanding to lee ann sling w/ CGA. Pt returned to supine w/ CGA. O2 left off pt w/ sats being 92%, RN notified. Pt is cleared by OT for d/c when medically appropriate.

## 2022-11-04 NOTE — PLAN OF CARE
Goal Outcome Evaluation:   Patient has been resting comfortably with no acute signs of distress. VSS. AO x4. Minor complaints of pain around surgical area but well controlled with pain medication. Nerve cath site C/D/I and infusing. Ambulating well with A x1. Arm remaining in sling. Dressing C/D/I. Remains on 2L O2 NC. Educated on use of IS. Patient tolerating well on room air but still desaturates with activity.

## 2022-11-04 NOTE — DISCHARGE INSTRUCTIONS
[facility] COLD THERAPY - PATIENT INSTRUCTION SHEET    Cold Compression Therapy for your comfort and rehabilitation  Your caregivers want you to be productive in your rehab and comfortable during your stay. In keeping with those goals, you will be receiving an SMI Cold Therapy Wrap to help ease post-operative pain and swelling that might keep you from getting back on track! Your SMI Cold Therapy Wrap is effective and simple-to-use, and you will be encouraged to apply it throughout your hospital stay and at home through the duration of your recovery.    When you are ready to go home  Be sure to take your SMI Cold Therapy Wrap and both sets of Gel Bags with you for continued comfort and use throughout your rehabilitation. If you don't already have them, ask your nurse or aide to retrieve your SMI Gel Bags from the patient freezer.    Home use precautions  Always follow your medical professional's application instructions upon discharge. Your SMI Cold Therapy Wrap and Gel Bags are designed to last for months following your surgery. Never heat the Gel Bags unless specified by your healthcare provider. Supervision is advised when using this product on children or geriatric patients. To avoid danger of suffocation, please keep the outer plastic packaging away from children & pets.    Cold Therapy Instructions  Place Gel Bags in a freezer set ¾ of the way to max temperature for at least (4) hours. For best results, lay the Gel Bags flat and [id number] in the freezer. Once frozen, slide Gel Bags into the gel pouch and secure your wrap to the affected area with the straps.  Gel wraps that have been stored in a freezer for an extended period of time may require a (10) minute period of softening up in a room temperature environment before application.  The gel pouch acts as a protective barrier. NEVER place frozen bags directly onto skin, as this may cause frostbite injury.  The SMI Cold Therapy Wrap is designed to be able to be  worm while ambulating. The compression straps can be secured well enough so that the Wrap won't fall off while moving.  Wrap Application Videos can be viewed at [employer].  An additional protective barrier such as clothing, a washcloth, hand-towel or pillowcase may be used during prolonged treatment applications.  The Gel-Pouch and Wrap are both Latex-Free and the Gel Bag ingredients are non toxic.    [facility] Wrap care instructions  The [facility] Cold Therapy Wrap may be hand washed and hung to dry when needed.    [facility] re-order information  Additional [facility] body specific wraps and/or Gel Bags can be re-ordered from [employer] or call [employer] ([phone])   InfuBLOCK - Patient Information    What is a pain pump?  InfuBLOCK is a postoperative, non-narcotic pain relief system that delivers local anesthetic to or near the surgical site. This is a pain minimizing therapy that delivers an anesthetic (numbing) medicine to the nerve.    The InfuBLOCK pain pump will continuously deliver a local anesthetic medication to block the pain in the area of your procedure.    Where can I find information about my pain pump?           For more information about your pain pump, scan the QR code.  For additional patient resources, visit [employer]/resources-pain-management.                                                                                             The [employer] Nursing Hotline is Here for You 24/7.     Call 1-820.644.2077 for Assistance.  While your physician is your primary source for information about your treatment., there may be times during your treatment that you need assistance with your infusion pump. Our team of compassionate and knowledgeable Registered Nursed (RN) is here to assist every step of the way.    Answers to questions about your infusion pump                 Tubing disconnect  Assistance with pump alarms                                                      Dislodged  catheter  Excessive leakage noted from pump                                         Inadequate pain control   Nerve Catheter Removal Instructions  When your device is empty:    Remove your catheter by pulling the dressing off slowly (like you would remove a regular bandage). The catheter should pull right out of the skin.  Check that the BLUE tip is intact.                                                                                     If the catheter is stuck, reposition your   extremity and pull slowly until removed.  *If catheter is HURTING and WON'T come out, stop and call [phone] for further assistance.    Remove medication bag from the black carrying case.  Cut the tubing on right and left side of pump, and discard the medication bag and tubing into garbage.  Place the pump and black carrying case into the plastic bag and then place this into the return box.  Seal box with blue stickers and return to [employer] postal [employer].    THIS IS PRE-PAID POSTAGE.

## 2022-11-04 NOTE — PROGRESS NOTES
I spoke with this patient and family by telephone.  Patient states he is doing well.  His pain is moderate in severity.  He says it is adequately controlled.  He states he has been able to mobilize to the bathroom without significant difficulty.  He states that his oxygen levels are much improved.    We will await occupational therapy evaluation.  We will wait evaluation of his oxygen levels with mobilization.  If he clears occupational therapy his pain is adequately controlled he may be discharged today to home

## 2022-11-05 ENCOUNTER — READMISSION MANAGEMENT (OUTPATIENT)
Dept: CALL CENTER | Facility: HOSPITAL | Age: 67
End: 2022-11-05

## 2023-09-15 ENCOUNTER — OFFICE VISIT (OUTPATIENT)
Dept: CARDIOLOGY | Facility: CLINIC | Age: 68
End: 2023-09-15
Payer: MEDICARE

## 2023-09-15 VITALS
OXYGEN SATURATION: 96 % | SYSTOLIC BLOOD PRESSURE: 118 MMHG | DIASTOLIC BLOOD PRESSURE: 68 MMHG | BODY MASS INDEX: 34.73 KG/M2 | HEART RATE: 84 BPM | HEIGHT: 70 IN | WEIGHT: 242.6 LBS

## 2023-09-15 DIAGNOSIS — R06.02 SHORTNESS OF BREATH: ICD-10-CM

## 2023-09-15 DIAGNOSIS — R00.2 PALPITATIONS: ICD-10-CM

## 2023-09-15 DIAGNOSIS — R07.2 PRECORDIAL PAIN: ICD-10-CM

## 2023-09-15 DIAGNOSIS — I25.119 CORONARY ARTERY DISEASE INVOLVING NATIVE CORONARY ARTERY OF NATIVE HEART WITH ANGINA PECTORIS: Primary | ICD-10-CM

## 2023-09-15 DIAGNOSIS — I65.23 BILATERAL CAROTID ARTERY STENOSIS: ICD-10-CM

## 2023-09-15 DIAGNOSIS — R42 DIZZINESS: ICD-10-CM

## 2023-09-15 DIAGNOSIS — I10 PRIMARY HYPERTENSION: ICD-10-CM

## 2023-09-15 PROCEDURE — 3074F SYST BP LT 130 MM HG: CPT | Performed by: PHYSICIAN ASSISTANT

## 2023-09-15 PROCEDURE — 3078F DIAST BP <80 MM HG: CPT | Performed by: PHYSICIAN ASSISTANT

## 2023-09-15 PROCEDURE — 93000 ELECTROCARDIOGRAM COMPLETE: CPT | Performed by: PHYSICIAN ASSISTANT

## 2023-09-15 PROCEDURE — 1159F MED LIST DOCD IN RCRD: CPT | Performed by: PHYSICIAN ASSISTANT

## 2023-09-15 PROCEDURE — 99214 OFFICE O/P EST MOD 30 MIN: CPT | Performed by: PHYSICIAN ASSISTANT

## 2023-09-15 PROCEDURE — 1160F RVW MEDS BY RX/DR IN RCRD: CPT | Performed by: PHYSICIAN ASSISTANT

## 2023-09-15 RX ORDER — NITROGLYCERIN 0.4 MG/1
TABLET SUBLINGUAL
Qty: 25 TABLET | Refills: 2 | Status: SHIPPED | OUTPATIENT
Start: 2023-09-15

## 2023-09-15 NOTE — PROGRESS NOTES
CHEST 2 VIEWS ROUTINE



CLINICAL HISTORY: Cough. Congestion.    



COMPARISON STUDY:  Chest radiograph March 10, 2015.



FINDINGS: Lung volumes are normal. No airspace opacities are identified. Mild

cardiomegaly is unchanged. A moderate sized hiatal hernia is again noted. There

is no evidence of pulmonary edema. No pneumothorax or pleural effusion is

present. 



IMPRESSION:  



1. No acute cardiopulmonary findings.



2. Mild cardiomegaly.



3. Moderate sized hiatal hernia. 









Electronically signed by:  Dejuan Lal M.D.

1/27/2017 10:24 AM



Dictated Date/Time:  1/27/2017 10:22 AM Problem list     Subjective   Shayne Neal is a 68 y.o. male     Chief Complaint   Patient presents with    Follow-up     Yearly follow up    Chest Pain    Shortness of Breath    Palpitations   Problem List:     1. Coronary artery disease with history of CABG and multiple stenting procedures otherwise.  1.1 Cath, August 2011 indicated patent DIEGO to LAD, patent stent and vein graft to circumflex, and patent stent to the RCA. Vein graft to the RCA was occluded. Medical management was recommended.  1.2 Recent cath, March 2015 in the setting of non-ST elevation MI with thrombectomy in the RCA and nonobstructive disease otherwise.   2. Hypertension  3. Dyslipidemia  4. Diabetes mellitus  5. Chronic kidney disease      6. COPD    HPI    The patient presents in clinic today for routine follow-up.  He just has not felt well as of recent.  He does note intermittent chest discomfort with exertion.  He has tried to start exercising, primarily walking.  When he starts to exert, he has left parasternal and precordial chest tightness.  There is mild referral to the left upper extremity at that time.  He must rest for some time and eventually symptoms minimized.  Baseline dyspnea has intensified as well.  He has started noticing increasing palpitations.  He reports that he will have abrupt onset of tachycardia, rates frequently up to 120 bpm or higher.  This resolves after several minutes, almost instantaneously.  He will have dizziness with this at times but never presyncope or syncope.  He has reported no PND nor orthopnea.  He has stable lower extremity edema.  Blood pressures appear well controlled.  He has no further complaints.  Current Outpatient Medications on File Prior to Visit   Medication Sig Dispense Refill    allopurinol (ZYLOPRIM) 300 MG tablet Take 1.5 tablets by mouth Daily. 1 1/2      ARIPiprazole (ABILIFY) 15 MG tablet Take 0.5 tablets by mouth Daily.      carvedilol (COREG) 12.5 MG tablet Take 0.5  tablets by mouth 2 (Two) Times a Day.      clopidogrel (PLAVIX) 75 MG tablet Take 1 tablet by mouth Daily.      cyclobenzaprine (FLEXERIL) 10 MG tablet Take 1 tablet by mouth 3 (Three) Times a Day As Needed for Muscle Spasms.      DULoxetine (CYMBALTA) 30 MG capsule Take 1 capsule by mouth Every Morning.      DULoxetine (CYMBALTA) 30 MG capsule Take 2 capsules by mouth Every Night.      furosemide (LASIX) 40 MG tablet Take 1 tablet by mouth Daily.      isosorbide dinitrate (ISORDIL) 30 MG tablet Take 1 tablet by mouth Daily.      lisinopril (PRINIVIL,ZESTRIL) 5 MG tablet Take 1 tablet by mouth Every Night.      omeprazole (priLOSEC) 20 MG capsule Take 1 capsule by mouth Daily.      prazosin (MINIPRESS) 2 MG capsule Take 4 capsules by mouth Every Night.      rosuvastatin (CRESTOR) 40 MG tablet Take 1 tablet by mouth Daily.      traZODone (DESYREL) 100 MG tablet Take 2.5 tablets by mouth Every Night.      [DISCONTINUED] meloxicam (MOBIC) 15 MG tablet Take 1 tablet by mouth Daily As Needed for Moderate Pain.      [DISCONTINUED] nitroglycerin (NITROSTAT) 0.4 MG SL tablet 1 under the tongue as needed for angina, may repeat q5mins for up three doses (Patient taking differently: 1 tablet Every 5 (Five) Minutes As Needed for Chest Pain. 1 under the tongue as needed for angina, may repeat q5mins for up three doses) 25 tablet 2    [DISCONTINUED] ondansetron (ZOFRAN) 4 MG tablet Take 1 tablet by mouth Every 8 (Eight) Hours As Needed for nausea 30 tablet 0    aspirin 81 MG EC tablet Take 1 tablet by mouth Daily. (Patient not taking: Reported on 9/15/2023)      [DISCONTINUED] furosemide (LASIX) 40 MG tablet Take 1 tablet by mouth Daily As Needed (swelling in feet and ankles if not decreased by taking am dose of 40mg Lasix).      [DISCONTINUED] ropivacaine (NAROPIN) 0.2 % infusion (INFUSYSTEM) 2 mg/hr by Peripheral Nerve route Continuous.       No current facility-administered medications on file prior to visit.        Erythromycin    Past Medical History:   Diagnosis Date    Asthma     Chronic kidney disease     Colon cancer     colon cancer    COPD (chronic obstructive pulmonary disease)     Coronary artery disease     Diabetes mellitus     diet controlled    Gout     Heart attack     History of transfusion     after knee surgery and with chemo, no reaction    Hyperlipidemia     Hypertension     Neuropathy     Sleep apnea     wears cpap with 2 liters at night    Stroke     , left sided weakness and periodic muscle jerking       Social History     Socioeconomic History    Marital status:    Tobacco Use    Smoking status: Former     Packs/day: 2.00     Years: 40.00     Pack years: 80.00     Types: Cigarettes     Quit date:      Years since quittin.7    Smokeless tobacco: Never   Vaping Use    Vaping Use: Never used   Substance and Sexual Activity    Alcohol use: Not Currently     Comment:     Drug use: Not Currently     Types: Marijuana     Comment: last use     Sexual activity: Defer       Family History   Problem Relation Age of Onset    Hypertension Mother     Heart disease Mother     Diabetes Mother     Hyperlipidemia Mother     Hypertension Father     Heart disease Father     Hyperlipidemia Father        Review of Systems   Constitutional: Negative.  Negative for chills, diaphoresis, fatigue and fever.   HENT: Negative.     Eyes: Negative.  Negative for visual disturbance.   Respiratory:  Positive for apnea (wears c-pap) and shortness of breath. Negative for cough, chest tightness and wheezing.    Cardiovascular:  Positive for chest pain, palpitations and leg swelling.   Gastrointestinal: Negative.  Negative for abdominal pain and blood in stool.   Endocrine: Positive for heat intolerance. Negative for cold intolerance.   Genitourinary:  Positive for hematuria.   Musculoskeletal:  Positive for arthralgias, back pain and neck pain. Negative for myalgias and neck stiffness.   Skin: Negative.  " Negative for rash and wound.   Allergic/Immunologic: Negative.  Negative for environmental allergies and food allergies.   Neurological:  Positive for numbness (hands and feet). Negative for dizziness, light-headedness and headaches.   Hematological:  Bruises/bleeds easily.   Psychiatric/Behavioral:  Positive for sleep disturbance.      Objective   Vitals:    09/15/23 0957   BP: 118/68   BP Location: Left arm   Patient Position: Sitting   Cuff Size: Adult   Pulse: 84   SpO2: 96%   Weight: 110 kg (242 lb 9.6 oz)   Height: 177.8 cm (70\")      /68 (BP Location: Left arm, Patient Position: Sitting, Cuff Size: Adult)   Pulse 84   Ht 177.8 cm (70\")   Wt 110 kg (242 lb 9.6 oz)   SpO2 96%   BMI 34.81 kg/m²    Lab Results (most recent)       None          Physical Exam  Vitals and nursing note reviewed.   Constitutional:       General: He is not in acute distress.     Appearance: He is well-developed.   HENT:      Head: Normocephalic and atraumatic.   Eyes:      Conjunctiva/sclera: Conjunctivae normal.      Pupils: Pupils are equal, round, and reactive to light.   Neck:      Vascular: No JVD.      Trachea: No tracheal deviation.   Cardiovascular:      Rate and Rhythm: Normal rate and regular rhythm.      Heart sounds: Normal heart sounds.   Pulmonary:      Effort: Pulmonary effort is normal.      Breath sounds: Normal breath sounds.   Abdominal:      General: Bowel sounds are normal. There is no distension.      Palpations: Abdomen is soft. There is no mass.      Tenderness: There is no abdominal tenderness. There is no guarding or rebound.   Musculoskeletal:         General: No tenderness or deformity. Normal range of motion.      Cervical back: Normal range of motion and neck supple.   Skin:     General: Skin is warm and dry.      Coloration: Skin is not pale.      Findings: No erythema or rash.   Neurological:      Mental Status: He is alert and oriented to person, place, and time.   Psychiatric:         " Behavior: Behavior normal.         Thought Content: Thought content normal.         Judgment: Judgment normal.         Procedure     ECG 12 Lead    Date/Time: 9/15/2023 10:04 AM  Performed by: John Barnard PA  Authorized by: John Barnard PA   Comparison: compared with previous ECG from 11/3/2022  Comparison to previous ECG: Sinus rhythm with PACs, rate 88, no acute changes noted.           Assessment & Plan      Diagnosis Plan   1. Coronary artery disease involving native coronary artery of native heart with angina pectoris  Adult Transthoracic Echo Complete W/ Cont if Necessary Per Protocol    Stress Test With Myocardial Perfusion One Day      2. Shortness of breath  Adult Transthoracic Echo Complete W/ Cont if Necessary Per Protocol    Stress Test With Myocardial Perfusion One Day      3. Precordial pain  Adult Transthoracic Echo Complete W/ Cont if Necessary Per Protocol    Stress Test With Myocardial Perfusion One Day      4. Primary hypertension  Adult Transthoracic Echo Complete W/ Cont if Necessary Per Protocol    Stress Test With Myocardial Perfusion One Day      5. Dizziness  Adult Transthoracic Echo Complete W/ Cont if Necessary Per Protocol    Stress Test With Myocardial Perfusion One Day    Duplex Carotid Ultrasound CAR      6. Bilateral carotid artery stenosis  Adult Transthoracic Echo Complete W/ Cont if Necessary Per Protocol    Stress Test With Myocardial Perfusion One Day      7. Palpitations  Adult Transthoracic Echo Complete W/ Cont if Necessary Per Protocol    Mobile Cardiac Outpatient Telemetry        1.  At this time, the patient appears to be doing reasonably well, although has started noticing some degree of symptoms as above.  With recurrent chest discomfort, dyspnea, and issues otherwise, particularly with cardiac history, we will schedule for noninvasive cardiac testing.  We will schedule for chemical nuclear stress test for ischemia assessment.    2.  I would also schedule for  an echo.  We can evaluate LV size and function, valvular morphologies, and cardiac structure otherwise.    3.  With increasing palpitations, we will schedule for an event monitor to evaluate for dysrhythmic substrates.    4.  I would like to repeat a carotid duplex to reevaluate known carotid artery disease.    5.  We will await results of the above and see the patient back.  We can recommend further.  For now, we will continue medications without change.           Advance Care Planning   ACP discussion was held with the patient during this visit. Patient does not have an advance directive, declines further assistance.         Electronically signed by:

## 2023-09-21 ENCOUNTER — HOSPITAL ENCOUNTER (OUTPATIENT)
Dept: CARDIOLOGY | Facility: HOSPITAL | Age: 68
Discharge: HOME | End: 2023-09-21
Payer: MEDICARE

## 2023-09-21 VITALS — HEIGHT: 70 IN | WEIGHT: 242.51 LBS | BODY MASS INDEX: 34.72 KG/M2

## 2023-09-21 DIAGNOSIS — I65.23 BILATERAL CAROTID ARTERY STENOSIS: ICD-10-CM

## 2023-09-21 DIAGNOSIS — R42 DIZZINESS: ICD-10-CM

## 2023-09-21 DIAGNOSIS — I25.119 CORONARY ARTERY DISEASE INVOLVING NATIVE CORONARY ARTERY OF NATIVE HEART WITH ANGINA PECTORIS: ICD-10-CM

## 2023-09-21 DIAGNOSIS — I10 PRIMARY HYPERTENSION: ICD-10-CM

## 2023-09-21 DIAGNOSIS — R06.02 SHORTNESS OF BREATH: ICD-10-CM

## 2023-09-21 DIAGNOSIS — R07.2 PRECORDIAL PAIN: ICD-10-CM

## 2023-09-21 DIAGNOSIS — R00.2 PALPITATIONS: ICD-10-CM

## 2023-09-21 LAB
BH CV REST NUCLEAR ISOTOPE DOSE: 10 MCI
BH CV STRESS COMMENTS STAGE 1: NORMAL
BH CV STRESS DOSE REGADENOSON STAGE 1: 0.4
BH CV STRESS DURATION MIN STAGE 1: 0
BH CV STRESS DURATION SEC STAGE 1: 10
BH CV STRESS NUCLEAR ISOTOPE DOSE: 30 MCI
BH CV STRESS PROTOCOL 1: NORMAL
BH CV STRESS RECOVERY BP: NORMAL MMHG
BH CV STRESS RECOVERY HR: 84 BPM
BH CV STRESS STAGE 1: 1
MAXIMAL PREDICTED HEART RATE: 150 BPM
PERCENT MAX PREDICTED HR: 58.67 %
STRESS BASELINE BP: NORMAL MMHG
STRESS BASELINE HR: 84 BPM
STRESS PERCENT HR: 69 %
STRESS POST PEAK BP: NORMAL MMHG
STRESS POST PEAK HR: 88 BPM
STRESS TARGET HR: 128 BPM

## 2023-09-21 PROCEDURE — 0 TECHNETIUM SESTAMIBI: Performed by: INTERNAL MEDICINE

## 2023-09-21 PROCEDURE — A9500 TC99M SESTAMIBI: HCPCS | Performed by: INTERNAL MEDICINE

## 2023-09-21 PROCEDURE — 93306 TTE W/DOPPLER COMPLETE: CPT

## 2023-09-21 PROCEDURE — 78452 HT MUSCLE IMAGE SPECT MULT: CPT

## 2023-09-21 PROCEDURE — 25010000002 REGADENOSON 0.4 MG/5ML SOLUTION: Performed by: INTERNAL MEDICINE

## 2023-09-21 PROCEDURE — 93017 CV STRESS TEST TRACING ONLY: CPT

## 2023-09-21 PROCEDURE — 93880 EXTRACRANIAL BILAT STUDY: CPT

## 2023-09-21 RX ORDER — REGADENOSON 0.08 MG/ML
0.4 INJECTION, SOLUTION INTRAVENOUS
Status: COMPLETED | OUTPATIENT
Start: 2023-09-21 | End: 2023-09-21

## 2023-09-21 RX ADMIN — REGADENOSON 0.4 MG: 0.08 INJECTION, SOLUTION INTRAVENOUS at 11:22

## 2023-09-21 RX ADMIN — TECHNETIUM TC 99M SESTAMIBI 1 DOSE: 1 INJECTION INTRAVENOUS at 09:23

## 2023-09-21 RX ADMIN — TECHNETIUM TC 99M SESTAMIBI 1 DOSE: 1 INJECTION INTRAVENOUS at 11:23

## 2023-09-24 LAB
AORTIC DIMENSIONLESS INDEX: 0.96 (DI)
BH CV ECHO MEAS - ACS: 1.47 CM
BH CV ECHO MEAS - AO MAX PG: 4.5 MMHG
BH CV ECHO MEAS - AO MEAN PG: 2.1 MMHG
BH CV ECHO MEAS - AO ROOT DIAM: 3.2 CM
BH CV ECHO MEAS - AO V2 MAX: 106.3 CM/SEC
BH CV ECHO MEAS - AO V2 VTI: 19.3 CM
BH CV ECHO MEAS - EDV(CUBED): 147.4 ML
BH CV ECHO MEAS - EF(MOD-BP): 61 %
BH CV ECHO MEAS - ESV(CUBED): 44.2 ML
BH CV ECHO MEAS - FS: 33.1 %
BH CV ECHO MEAS - IVS/LVPW: 0.9 CM
BH CV ECHO MEAS - IVSD: 1.08 CM
BH CV ECHO MEAS - LA DIMENSION: 4.3 CM
BH CV ECHO MEAS - LAT PEAK E' VEL: 12.7 CM/SEC
BH CV ECHO MEAS - LV MASS(C)D: 238.6 GRAMS
BH CV ECHO MEAS - LV MAX PG: 4.1 MMHG
BH CV ECHO MEAS - LV MEAN PG: 1.78 MMHG
BH CV ECHO MEAS - LV V1 MAX: 101.6 CM/SEC
BH CV ECHO MEAS - LV V1 VTI: 19.7 CM
BH CV ECHO MEAS - LVIDD: 5.3 CM
BH CV ECHO MEAS - LVIDS: 3.5 CM
BH CV ECHO MEAS - LVPWD: 1.2 CM
BH CV ECHO MEAS - MED PEAK E' VEL: 6.5 CM/SEC
BH CV ECHO MEAS - MV A MAX VEL: 71 CM/SEC
BH CV ECHO MEAS - MV DEC SLOPE: 292.7 CM/SEC2
BH CV ECHO MEAS - MV DEC TIME: 0.29 SEC
BH CV ECHO MEAS - MV E MAX VEL: 57.6 CM/SEC
BH CV ECHO MEAS - MV E/A: 0.81
BH CV ECHO MEAS - MV MAX PG: 3.3 MMHG
BH CV ECHO MEAS - MV MEAN PG: 1.55 MMHG
BH CV ECHO MEAS - MV P1/2T: 80.2 MSEC
BH CV ECHO MEAS - MV V2 VTI: 25.1 CM
BH CV ECHO MEAS - MVA(P1/2T): 2.7 CM2
BH CV ECHO MEAS - PA V2 MAX: 122.7 CM/SEC
BH CV ECHO MEAS - RAP SYSTOLE: 8 MMHG
BH CV ECHO MEAS - RV MAX PG: 2.9 MMHG
BH CV ECHO MEAS - RV V1 MAX: 85.4 CM/SEC
BH CV ECHO MEAS - RV V1 VTI: 18 CM
BH CV ECHO MEAS - RVSP: 30.8 MMHG
BH CV ECHO MEAS - TAPSE (>1.6): 1.8 CM
BH CV ECHO MEAS - TR MAX PG: 22.8 MMHG
BH CV ECHO MEAS - TR MAX VEL: 238.6 CM/SEC
BH CV ECHO MEASUREMENTS AVERAGE E/E' RATIO: 6
BH CV XLRA - TDI S': 9.4 CM/SEC
BH CV XLRA MEAS LEFT DIST CCA EDV: 26.8 CM/SEC
BH CV XLRA MEAS LEFT DIST CCA PSV: 91.5 CM/SEC
BH CV XLRA MEAS LEFT DIST ICA EDV: -53.3 CM/SEC
BH CV XLRA MEAS LEFT DIST ICA PSV: -147 CM/SEC
BH CV XLRA MEAS LEFT ICA/CCA RATIO: 1.6
BH CV XLRA MEAS LEFT MID ICA EDV: -46.2 CM/SEC
BH CV XLRA MEAS LEFT MID ICA PSV: -114 CM/SEC
BH CV XLRA MEAS LEFT PROX CCA EDV: 27.1 CM/SEC
BH CV XLRA MEAS LEFT PROX CCA PSV: 142 CM/SEC
BH CV XLRA MEAS LEFT PROX ECA PSV: -123 CM/SEC
BH CV XLRA MEAS LEFT PROX ICA EDV: -25.5 CM/SEC
BH CV XLRA MEAS LEFT PROX ICA PSV: -78.9 CM/SEC
BH CV XLRA MEAS LEFT VERTEBRAL A EDV: 15.6 CM/SEC
BH CV XLRA MEAS LEFT VERTEBRAL A PSV: 49.4 CM/SEC
BH CV XLRA MEAS RIGHT DIST CCA EDV: 27.3 CM/SEC
BH CV XLRA MEAS RIGHT DIST CCA PSV: 93.2 CM/SEC
BH CV XLRA MEAS RIGHT DIST ICA EDV: -40.8 CM/SEC
BH CV XLRA MEAS RIGHT DIST ICA PSV: -108 CM/SEC
BH CV XLRA MEAS RIGHT ICA/CCA RATIO: 1.2
BH CV XLRA MEAS RIGHT MID ICA EDV: -46.3 CM/SEC
BH CV XLRA MEAS RIGHT MID ICA PSV: -111 CM/SEC
BH CV XLRA MEAS RIGHT PROX CCA EDV: 31.7 CM/SEC
BH CV XLRA MEAS RIGHT PROX CCA PSV: 100 CM/SEC
BH CV XLRA MEAS RIGHT PROX ECA PSV: -224 CM/SEC
BH CV XLRA MEAS RIGHT PROX ICA EDV: -25.9 CM/SEC
BH CV XLRA MEAS RIGHT PROX ICA PSV: -92.5 CM/SEC
BH CV XLRA MEAS RIGHT VERTEBRAL A EDV: 21.4 CM/SEC
BH CV XLRA MEAS RIGHT VERTEBRAL A PSV: 51.6 CM/SEC
SINUS: 3.3 CM

## 2023-09-26 ENCOUNTER — TELEPHONE (OUTPATIENT)
Dept: CARDIOLOGY | Facility: CLINIC | Age: 68
End: 2023-09-26
Payer: MEDICARE

## 2023-09-26 NOTE — TELEPHONE ENCOUNTER
Stress Test With Myocardial Perfusion   Called and informed pt of no acute findings or abnormalities. 9/26/2023    ----- Message from Katelyn Espinosa MA sent at 9/22/2023  8:38 AM EDT -----    ----- Message -----  From: John Barnard PA  Sent: 9/21/2023  10:25 PM EDT  To: Katelyn Espinosa MA    Routine follow-up, sooner for symptoms.  ----- Message -----  From: Andres Pastor MD  Sent: 9/21/2023   9:39 PM EDT  To: KIKI Barba

## 2023-09-27 ENCOUNTER — TELEPHONE (OUTPATIENT)
Dept: CARDIOLOGY | Facility: CLINIC | Age: 68
End: 2023-09-27
Payer: MEDICARE

## 2023-09-27 DIAGNOSIS — R42 DIZZINESS: Primary | ICD-10-CM

## 2023-09-27 DIAGNOSIS — I65.23 BILATERAL CAROTID ARTERY STENOSIS: ICD-10-CM

## 2023-09-27 NOTE — TELEPHONE ENCOUNTER
Patient given results of carotid ultrasound and echo. He is agreeable to proceed with CTA. Advised he will receive a call with date and time.    ----- Message from Katelyn Espinosa MA sent at 9/26/2023  5:42 PM EDT -----    Carotid  ----- Message -----  From: John Barnard PA  Sent: 9/25/2023   2:24 PM EDT  To: Katelyn Espinosa MA    Moderate disease.  Routine carotid study should be considered.      Result Text  1.  The right common carotid artery is widely patent throughout.  There is complex atheroma at the origin of the left common carotid artery with some features suggestive of an ulcerated plaque.  Doppler is compatible with 16 to 49% stenosis at that level.     2.  Mild bifurcation disease bilaterally.  There is 16 to 49% stenosis by Doppler in both sides.     3.  The proximal portions of both internal carotid arteries are moderately calcified.  There is diffuse nonobstructive atheroma with 16 to 49% stenosis by Doppler bilaterally.     4.  Antegrade flow in both vertebral arteries.     Summary: Nonobstructive carotid disease bilaterally as detailed above.  Antegrade flow in both vertebral arteries.  If felt clinically relevant CTA could be utilized to better evaluate the origin of the left common carotid artery.     Echo  ----- Message -----   From: John Barnard PA   Sent: 9/25/2023   2:24 PM EDT   To: Katelyn Espinosa MA     Routine follow-up.

## 2023-10-10 ENCOUNTER — TELEPHONE (OUTPATIENT)
Dept: CARDIOLOGY | Facility: CLINIC | Age: 68
End: 2023-10-10
Payer: MEDICARE

## 2023-10-10 NOTE — TELEPHONE ENCOUNTER
Pt came in stating he is having chest pain after testing and asked to see John or Dr. Pastor. Let pt know neither John or Dr. Pastor were in the office and that he needed to go to the ER. PT asked is Dr. Pastor would do testing at [facility], let pt know [facility] would do testing but we can pull records for our providers to review. Pt asked if Dr. Pastor would do heart cath if he needed one, let pt know the cardiologist on call would perform the cath but pt could let them know he is Dr. Pastor's pt and request him to do the heart cath, pt voiced understanding.

## 2023-10-10 NOTE — TELEPHONE ENCOUNTER
Caller: Shayne Neal    Relationship: Self    Best call back number: [phone]    What is the best time to reach you: ANY    Who are you requesting to speak with (clinical staff, provider,  specific staff member): CLINICAL      What was the call regarding: PT WAS SENT TO [facility] ED, THEY ARE CALLING IN TO GIVE THE OFFICE AN UPDATE ON FUTURE APPT. PT IS REQUESTING TO BE SEEN BY DR. GALARZA SPECIFICALLY AND THE ED IS WANTING HIM TO BE SEEN BY NEXT WEEK.

## 2023-10-17 ENCOUNTER — OFFICE VISIT (OUTPATIENT)
Dept: CARDIOLOGY | Facility: CLINIC | Age: 68
End: 2023-10-17
Payer: MEDICARE

## 2023-10-17 ENCOUNTER — LAB (OUTPATIENT)
Dept: CARDIOLOGY | Facility: CLINIC | Age: 68
End: 2023-10-17
Payer: MEDICARE

## 2023-10-17 VITALS
DIASTOLIC BLOOD PRESSURE: 69 MMHG | OXYGEN SATURATION: 94 % | SYSTOLIC BLOOD PRESSURE: 120 MMHG | HEIGHT: 70 IN | WEIGHT: 243.8 LBS | HEART RATE: 101 BPM | BODY MASS INDEX: 34.9 KG/M2

## 2023-10-17 DIAGNOSIS — R00.2 PALPITATIONS: ICD-10-CM

## 2023-10-17 DIAGNOSIS — R07.2 PRECORDIAL PAIN: ICD-10-CM

## 2023-10-17 DIAGNOSIS — R06.02 SHORTNESS OF BREATH: ICD-10-CM

## 2023-10-17 DIAGNOSIS — I10 PRIMARY HYPERTENSION: ICD-10-CM

## 2023-10-17 DIAGNOSIS — I25.119 CORONARY ARTERY DISEASE INVOLVING NATIVE CORONARY ARTERY OF NATIVE HEART WITH ANGINA PECTORIS: Primary | ICD-10-CM

## 2023-10-17 DIAGNOSIS — R42 DIZZINESS: ICD-10-CM

## 2023-10-17 DIAGNOSIS — I65.23 BILATERAL CAROTID ARTERY STENOSIS: ICD-10-CM

## 2023-10-17 DIAGNOSIS — I25.119 CORONARY ARTERY DISEASE INVOLVING NATIVE CORONARY ARTERY OF NATIVE HEART WITH ANGINA PECTORIS: ICD-10-CM

## 2023-10-17 LAB
ALBUMIN SERPL-MCNC: 3.9 G/DL (ref 3.5–5.2)
ALBUMIN/GLOB SERPL: 1.4 G/DL
ALP SERPL-CCNC: 58 U/L (ref 39–117)
ALT SERPL W P-5'-P-CCNC: 22 U/L (ref 1–41)
ANION GAP SERPL CALCULATED.3IONS-SCNC: 11.4 MMOL/L (ref 5–15)
AST SERPL-CCNC: 15 U/L (ref 1–40)
BASOPHILS # BLD AUTO: 0.03 10*3/MM3 (ref 0–0.2)
BASOPHILS NFR BLD AUTO: 0.4 % (ref 0–1.5)
BILIRUB SERPL-MCNC: 0.4 MG/DL (ref 0–1.2)
BUN SERPL-MCNC: 23 MG/DL (ref 8–23)
BUN/CREAT SERPL: 15.9 (ref 7–25)
CALCIUM SPEC-SCNC: 9.1 MG/DL (ref 8.6–10.5)
CHLORIDE SERPL-SCNC: 99 MMOL/L (ref 98–107)
CO2 SERPL-SCNC: 26.6 MMOL/L (ref 22–29)
CREAT SERPL-MCNC: 1.45 MG/DL (ref 0.76–1.27)
DEPRECATED RDW RBC AUTO: 51.8 FL (ref 37–54)
EGFRCR SERPLBLD CKD-EPI 2021: 51.8 ML/MIN/1.73
EOSINOPHIL # BLD AUTO: 0.16 10*3/MM3 (ref 0–0.4)
EOSINOPHIL NFR BLD AUTO: 2.2 % (ref 0.3–6.2)
ERYTHROCYTE [DISTWIDTH] IN BLOOD BY AUTOMATED COUNT: 13.9 % (ref 12.3–15.4)
GLOBULIN UR ELPH-MCNC: 2.7 GM/DL
GLUCOSE SERPL-MCNC: 104 MG/DL (ref 65–99)
HCT VFR BLD AUTO: 45.5 % (ref 37.5–51)
HGB BLD-MCNC: 14.2 G/DL (ref 13–17.7)
IMM GRANULOCYTES # BLD AUTO: 0.05 10*3/MM3 (ref 0–0.05)
IMM GRANULOCYTES NFR BLD AUTO: 0.7 % (ref 0–0.5)
LYMPHOCYTES # BLD AUTO: 1.55 10*3/MM3 (ref 0.7–3.1)
LYMPHOCYTES NFR BLD AUTO: 21 % (ref 19.6–45.3)
MCH RBC QN AUTO: 31.5 PG (ref 26.6–33)
MCHC RBC AUTO-ENTMCNC: 31.2 G/DL (ref 31.5–35.7)
MCV RBC AUTO: 100.9 FL (ref 79–97)
MONOCYTES # BLD AUTO: 0.76 10*3/MM3 (ref 0.1–0.9)
MONOCYTES NFR BLD AUTO: 10.3 % (ref 5–12)
NEUTROPHILS NFR BLD AUTO: 4.83 10*3/MM3 (ref 1.7–7)
NEUTROPHILS NFR BLD AUTO: 65.4 % (ref 42.7–76)
NRBC BLD AUTO-RTO: 0 /100 WBC (ref 0–0.2)
PLATELET # BLD AUTO: 143 10*3/MM3 (ref 140–450)
PMV BLD AUTO: 11.1 FL (ref 6–12)
POTASSIUM SERPL-SCNC: 4.5 MMOL/L (ref 3.5–5.2)
PROT SERPL-MCNC: 6.6 G/DL (ref 6–8.5)
RBC # BLD AUTO: 4.51 10*6/MM3 (ref 4.14–5.8)
SODIUM SERPL-SCNC: 137 MMOL/L (ref 136–145)
WBC NRBC COR # BLD: 7.38 10*3/MM3 (ref 3.4–10.8)

## 2023-10-17 PROCEDURE — 80053 COMPREHEN METABOLIC PANEL: CPT | Performed by: PHYSICIAN ASSISTANT

## 2023-10-17 PROCEDURE — 3078F DIAST BP <80 MM HG: CPT | Performed by: PHYSICIAN ASSISTANT

## 2023-10-17 PROCEDURE — 1160F RVW MEDS BY RX/DR IN RCRD: CPT | Performed by: PHYSICIAN ASSISTANT

## 2023-10-17 PROCEDURE — 3074F SYST BP LT 130 MM HG: CPT | Performed by: PHYSICIAN ASSISTANT

## 2023-10-17 PROCEDURE — 93000 ELECTROCARDIOGRAM COMPLETE: CPT | Performed by: PHYSICIAN ASSISTANT

## 2023-10-17 PROCEDURE — 85025 COMPLETE CBC W/AUTO DIFF WBC: CPT | Performed by: PHYSICIAN ASSISTANT

## 2023-10-17 PROCEDURE — 99214 OFFICE O/P EST MOD 30 MIN: CPT | Performed by: PHYSICIAN ASSISTANT

## 2023-10-17 PROCEDURE — 1159F MED LIST DOCD IN RCRD: CPT | Performed by: PHYSICIAN ASSISTANT

## 2023-10-17 RX ORDER — ASPIRIN 81 MG/1
81 TABLET ORAL DAILY
COMMUNITY

## 2023-10-17 NOTE — PROGRESS NOTES
Problem list     Kym Neal is a 68 y.o. male     Chief Complaint   Patient presents with    Follow-up     ER visit [facility]- notes, labs in chart    Chest Pain    Shortness of Breath   Problem List:     1. Coronary artery disease with history of CABG and multiple stenting procedures otherwise.  1.1 Cath, August 2011 indicated patent DIEGO to LAD, patent stent and vein graft to circumflex, and patent stent to the RCA. Vein graft to the RCA was occluded. Medical management was recommended.  1.2 Recent cath, March 2015 in the setting of non-ST elevation MI with thrombectomy in the RCA and nonobstructive disease otherwise.   2. Hypertension  3. Dyslipidemia  4. Diabetes mellitus  5. Chronic kidney disease      6. COPD    HPI  The patient presents to the clinic today to review stress test and echo findings primarily.  He was scheduled for testing because of clinical scenario noted at last evaluation.  Stress test was performed.  There was no scintigraphic evidence of ischemia, but the patient had an elevated transient ischemic dilation ratio.  Echo supported an EF at 50 to 55% with no significant valvular nor structural abnormalities otherwise.  A carotid duplex also was performed which indicated moderate disease of the left common carotid artery with some features suggesting an ulcerative plaque.  He has been scheduled for carotid CTA to evaluate this further.  Event monitor indicated sinus rhythm as the baseline rhythm.  He had short episodes of SVT noted throughout.  PACs and PVCs were noted otherwise.  We did review this with the patient.  Clinically he is not doing well.  He feels very similar to what he has felt prior to prior bypass and stenting procedures.  He reports that with any activity at all, he becomes very dyspneic, weak, and then develops chest tightness.  He must rest for some time before symptoms resolved.  He has no PND nor orthopnea.  He still has intermittent palpitations, seemingly  improved over his last evaluation here.  He has no further complaints otherwise and again presents for evaluation because of the above.  It is noted that he did have to go to the emergency room just last week because of recurrent chest tightness.  Troponin levels and EKG findings were largely benign.  With appropriate medications given in the ER, his chest pain eventually resolved.  He was advised to pursue further evaluation and he presents today in this setting.    Current Outpatient Medications on File Prior to Visit   Medication Sig Dispense Refill    allopurinol (ZYLOPRIM) 300 MG tablet Take 1.5 tablets by mouth Daily. 1 1/2      ARIPiprazole (ABILIFY) 15 MG tablet Take 0.5 tablets by mouth Daily.      aspirin 81 MG EC tablet Take 1 tablet by mouth Daily.      carvedilol (COREG) 12.5 MG tablet Take 0.5 tablets by mouth 2 (Two) Times a Day.      clopidogrel (PLAVIX) 75 MG tablet Take 1 tablet by mouth Daily.      cyclobenzaprine (FLEXERIL) 10 MG tablet Take 1 tablet by mouth 3 (Three) Times a Day As Needed for Muscle Spasms.      DULoxetine (CYMBALTA) 30 MG capsule Take 1 capsule by mouth Every Morning.      DULoxetine (CYMBALTA) 30 MG capsule Take 2 capsules by mouth Every Night.      furosemide (LASIX) 40 MG tablet Take 1 tablet by mouth Daily.      isosorbide dinitrate (ISORDIL) 30 MG tablet Take 1 tablet by mouth Daily.      lisinopril (PRINIVIL,ZESTRIL) 5 MG tablet Take 1 tablet by mouth Every Night.      nitroglycerin (NITROSTAT) 0.4 MG SL tablet 1 under the tongue as needed for angina, may repeat q5mins for up three doses 25 tablet 2    omeprazole (priLOSEC) 20 MG capsule Take 1 capsule by mouth Daily.      prazosin (MINIPRESS) 2 MG capsule Take 4 capsules by mouth Every Night.      rosuvastatin (CRESTOR) 40 MG tablet Take 1 tablet by mouth Daily.      traZODone (DESYREL) 100 MG tablet Take 2.5 tablets by mouth Every Night.      [DISCONTINUED] aspirin 81 MG EC tablet Take 1 tablet by mouth Daily.       No  current facility-administered medications on file prior to visit.       Erythromycin    Past Medical History:   Diagnosis Date    Asthma     Chronic kidney disease     Colon cancer     colon cancer    COPD (chronic obstructive pulmonary disease)     Coronary artery disease     Diabetes mellitus     diet controlled    Gout     Heart attack     History of transfusion     after knee surgery and with chemo, no reaction    Hyperlipidemia     Hypertension     Neuropathy     Sleep apnea     wears cpap with 2 liters at night    Stroke     , left sided weakness and periodic muscle jerking       Social History     Socioeconomic History    Marital status:    Tobacco Use    Smoking status: Former     Packs/day: 2.00     Years: 40.00     Additional pack years: 0.00     Total pack years: 80.00     Types: Cigarettes     Quit date:      Years since quittin.8    Smokeless tobacco: Never   Vaping Use    Vaping Use: Never used   Substance and Sexual Activity    Alcohol use: Not Currently     Comment:     Drug use: Not Currently     Types: Marijuana     Comment: last use     Sexual activity: Defer       Family History   Problem Relation Age of Onset    Hypertension Mother     Heart disease Mother     Diabetes Mother     Hyperlipidemia Mother     Hypertension Father     Heart disease Father     Hyperlipidemia Father        Review of Systems   Constitutional:  Positive for fatigue. Negative for chills, diaphoresis and fever.   HENT: Negative.     Eyes: Negative.  Negative for visual disturbance.   Respiratory:  Positive for apnea (wears c-pap) and shortness of breath. Negative for cough, chest tightness and wheezing.    Cardiovascular:  Positive for chest pain, palpitations and leg swelling.   Gastrointestinal:  Positive for abdominal pain. Negative for blood in stool.   Endocrine: Negative.  Negative for cold intolerance and heat intolerance.   Genitourinary: Negative.  Negative for hematuria.  "  Musculoskeletal:  Positive for arthralgias (feet ankles, knees, shoulders and hips). Negative for back pain, myalgias, neck pain and neck stiffness.   Skin: Negative.  Negative for rash and wound.   Allergic/Immunologic: Negative.  Negative for environmental allergies and food allergies.   Neurological: Negative.  Negative for dizziness, syncope, weakness, light-headedness, numbness and headaches.   Hematological:  Bruises/bleeds easily.   Psychiatric/Behavioral:  Positive for sleep disturbance.        Objective   Vitals:    10/17/23 1237   BP: 120/69   BP Location: Left arm   Patient Position: Sitting   Cuff Size: Adult   Pulse: 101   SpO2: 94%   Weight: 111 kg (243 lb 12.8 oz)   Height: 177.8 cm (70\")      /69 (BP Location: Left arm, Patient Position: Sitting, Cuff Size: Adult)   Pulse 101   Ht 177.8 cm (70\")   Wt 111 kg (243 lb 12.8 oz)   SpO2 94%   BMI 34.98 kg/m²    Lab Results (most recent)       None          Physical Exam  Vitals and nursing note reviewed.   Constitutional:       General: He is not in acute distress.     Appearance: He is well-developed.   HENT:      Head: Normocephalic and atraumatic.   Eyes:      Conjunctiva/sclera: Conjunctivae normal.      Pupils: Pupils are equal, round, and reactive to light.   Neck:      Vascular: No JVD.      Trachea: No tracheal deviation.   Cardiovascular:      Rate and Rhythm: Normal rate and regular rhythm.      Heart sounds: Normal heart sounds.   Pulmonary:      Effort: Pulmonary effort is normal.      Breath sounds: Normal breath sounds.   Abdominal:      General: Bowel sounds are normal. There is no distension.      Palpations: Abdomen is soft. There is no mass.      Tenderness: There is no abdominal tenderness. There is no guarding or rebound.   Musculoskeletal:         General: No tenderness or deformity. Normal range of motion.      Cervical back: Normal range of motion and neck supple.   Skin:     General: Skin is warm and dry.      " Coloration: Skin is not pale.      Findings: No erythema or rash.   Neurological:      Mental Status: He is alert and oriented to person, place, and time.   Psychiatric:         Behavior: Behavior normal.         Thought Content: Thought content normal.         Judgment: Judgment normal.           Procedure     ECG 12 Lead    Date/Time: 10/17/2023 1:00 PM  Performed by: John Barnard PA    Authorized by: John Barnard PA  Comparison: compared with previous ECG from 9/15/2023  Comparison to previous ECG: Sinus rhythm, PACs noted, normal axis, inferior wall MI age-indeterminate, nonspecific ST and T wave changes, RSR prime V1 V2, no acute changes noted.             Assessment & Plan      Diagnosis Plan   1. Coronary artery disease involving native coronary artery of native heart with angina pectoris  CBC & Differential    Comprehensive Metabolic Panel    [facility] Cath      2. Shortness of breath  CBC & Differential    Comprehensive Metabolic Panel    [facility] Cath      3. Precordial pain  CBC & Differential    Comprehensive Metabolic Panel    [facility] Cath      4. Palpitations  CBC & Differential    Comprehensive Metabolic Panel    [facility] Cath      5. Primary hypertension  CBC & Differential    Comprehensive Metabolic Panel    [facility] Cath        1.  The patient presents for review today.  He is very concerned about ongoing symptoms of dyspnea, fatigue, chest tightness, and issues otherwise as above.  He has had progressive exercise intolerance because of the same.  Stress test did not yield scintigraphic evidence of ischemia, but he did have an elevated transient ischemic dilation ratio.  I feel with low level symptoms and abnormal stress test findings, he needs further evaluation.  He is very concerned about his severity of symptoms, particularly as this represents basically the same symptoms as he had prior to previous interventions.  He would like to pursue cath.  He  will be scheduled accordingly.    2.  He will need laboratories in the precath setting.  Those have been scheduled as above.  He will have those through the clinic today.    3.  He will continue Plavix, statin therapy, and medical regimen otherwise as above.  I have asked that he restart aspirin.    4.  We will try to do the catheterization as soon as possible.  For any issues prior to catheterization, he will call immediately.  Further pending findings of catheterization.    5.  Of note, he had abnormal findings on his carotid duplex as above.  His carotid CTA has been scheduled for this Friday.  I have asked that his catheterization be performed at least 2 weeks after that given borderline renal status and contrast load of those studies.               Advance Care Planning   ACP discussion was held with the patient during this visit. Patient does not have an advance directive, declines further assistance.       Electronically signed by:

## 2023-10-18 ENCOUNTER — TELEPHONE (OUTPATIENT)
Dept: CARDIOLOGY | Facility: CLINIC | Age: 68
End: 2023-10-18
Payer: MEDICARE

## 2023-10-18 NOTE — TELEPHONE ENCOUNTER
----- Message from KIKI Barba sent at 10/18/2023  8:38 AM EDT -----  Glucose minimally elevated.  Creatinine at 1.45.  Given his degree of renal insufficiency in the past, I think the patient would benefit from renal protocol in the precath setting.  CBC benign.  ----- Message -----  From: Lab, Background User  Sent: 10/17/2023   4:50 PM EDT  To: KIKI Barba

## 2023-10-31 ENCOUNTER — TELEPHONE (OUTPATIENT)
Dept: CARDIOLOGY | Facility: CLINIC | Age: 68
End: 2023-10-31
Payer: MEDICARE

## 2023-10-31 NOTE — TELEPHONE ENCOUNTER
Patient notified of CT of carotids result's and recommendation's to manage medically and long term monitoring.

## 2023-10-31 NOTE — TELEPHONE ENCOUNTER
----- Message from KIKI Barba sent at 10/30/2023  8:59 AM EDT -----  Nonobstructive disease.  Manage medically and long-term monitoring.  ----- Message -----  From: Lupillo Balbuena Incoming  Sent: 10/24/2023   9:48 AM EDT  To: KIKI Barba

## 2023-11-09 ENCOUNTER — OUTSIDE FACILITY SERVICE (OUTPATIENT)
Dept: CARDIOLOGY | Facility: CLINIC | Age: 68
End: 2023-11-09
Payer: MEDICARE

## 2023-11-09 PROCEDURE — 0715T PR PERCUTANEOUS TRANSLUMINAL CORONARY LITHOTRIPSY: CPT | Performed by: INTERNAL MEDICINE

## 2023-11-09 PROCEDURE — 93455 CORONARY ART/GRFT ANGIO S&I: CPT | Performed by: INTERNAL MEDICINE

## 2023-11-09 PROCEDURE — 92943 PRQ TRLUML REVSC CH OCC ANT: CPT | Performed by: INTERNAL MEDICINE

## 2023-11-09 PROCEDURE — 92978 ENDOLUMINL IVUS OCT C 1ST: CPT | Performed by: INTERNAL MEDICINE

## 2023-11-13 ENCOUNTER — TELEPHONE (OUTPATIENT)
Dept: CARDIOLOGY | Facility: CLINIC | Age: 68
End: 2023-11-13

## 2023-11-13 NOTE — TELEPHONE ENCOUNTER
Caller: bert mckeon    Relationship to patient: Emergency Contact    Best call back number: [phone]    Chief complaint:     Type of visit: HOSPITAL FOLLOW UP    Requested date: THIS WEEK     If rescheduling, when is the original appointment: 12.12.2023     Additional notes:DR. GALARZA TOLD THE PATIENT HE WANTS HIM IN THIS WEEK. HE CANNOT COME IN WEDNESDAY BECAUSE HE HAS A TEST. PLEASE CALL ASAP TO RESCHEDULE

## 2023-11-13 NOTE — TELEPHONE ENCOUNTER
Caller: [facility] [place] [place]    Relationship to patient: Provider    Best call back number: [phone]     Chief complaint: Atherosclerotic heart disease of native coronary artery with unspecified angina pectoris      Type of visit: HOS F/U    Requested date: 2 WEEKS FROM 11/13/23     Additional notes:PT IS NEEDING A 2 WEEK HOS F/U. NEXT AVAILABLE ISN'T UNTIL 2024.

## 2023-11-13 NOTE — TELEPHONE ENCOUNTER
S/W PT'S WIFE SHE IS ON HIPAA. SHE CONFIRMED APPT THURS 11/16/2023 @ 9 AM. SHE IS AWARE TO COME DOWNSTAIRS FOR THE VISIT.

## 2023-11-16 ENCOUNTER — OFFICE VISIT (OUTPATIENT)
Dept: CARDIOLOGY | Facility: CLINIC | Age: 68
End: 2023-11-16
Payer: MEDICARE

## 2023-11-16 VITALS
DIASTOLIC BLOOD PRESSURE: 58 MMHG | OXYGEN SATURATION: 95 % | SYSTOLIC BLOOD PRESSURE: 96 MMHG | WEIGHT: 246 LBS | HEIGHT: 70 IN | HEART RATE: 64 BPM | BODY MASS INDEX: 35.22 KG/M2

## 2023-11-16 DIAGNOSIS — I25.119 CORONARY ARTERY DISEASE INVOLVING NATIVE CORONARY ARTERY OF NATIVE HEART WITH ANGINA PECTORIS: Primary | ICD-10-CM

## 2023-11-16 DIAGNOSIS — R06.02 SHORTNESS OF BREATH: ICD-10-CM

## 2023-11-16 DIAGNOSIS — R00.2 PALPITATIONS: ICD-10-CM

## 2023-11-16 PROCEDURE — 99213 OFFICE O/P EST LOW 20 MIN: CPT | Performed by: PHYSICIAN ASSISTANT

## 2023-11-16 PROCEDURE — 1159F MED LIST DOCD IN RCRD: CPT | Performed by: PHYSICIAN ASSISTANT

## 2023-11-16 PROCEDURE — 3074F SYST BP LT 130 MM HG: CPT | Performed by: PHYSICIAN ASSISTANT

## 2023-11-16 PROCEDURE — 1160F RVW MEDS BY RX/DR IN RCRD: CPT | Performed by: PHYSICIAN ASSISTANT

## 2023-11-16 PROCEDURE — 3078F DIAST BP <80 MM HG: CPT | Performed by: PHYSICIAN ASSISTANT

## 2023-11-16 RX ORDER — ISOSORBIDE MONONITRATE 30 MG/1
30 TABLET, EXTENDED RELEASE ORAL DAILY
COMMUNITY

## 2023-11-16 NOTE — PROGRESS NOTES
Problem list     Kym Neal is a 68 y.o. male     Chief Complaint   Patient presents with    Follow-up     Heart cath with stent per Dr. Pastor    Palpitations   Problem List:     1. Coronary artery disease with history of CABG and multiple stenting procedures otherwise.  1.1 Cath, August 2011 indicated patent DIEGO to LAD, patent stent and vein graft to circumflex, and patent stent to the RCA. Vein graft to the RCA was occluded. Medical management was recommended.  1.2.  Recent cath, March 2015 in the setting of non-ST elevation MI with thrombectomy in the RCA and nonobstructive disease otherwise.   1.3.  Catheterization, 11/2023, because of low level symptoms, and equivocal stress test findings.  The patient had subsequent stenting of severely calcified total occlusion of the ostium of the RCA.  He had shockwave lithotripsy performed with follow-up stenting.  Anatomy otherwise was felt stable when compared to prior study.  2. Hypertension  3. Dyslipidemia  4. Diabetes mellitus  5. Chronic kidney disease      6. COPD    HPI  The patient presents in follow-up post catheterization.  He was scheduled for catheterization because of ongoing low-level symptoms and equivocal stress test findings.  Catheter resulted in shockwave lithotripsy and eventual stenting of chronic total occlusion of the ostial RCA lesion.  He had stable anatomy otherwise.  Just prior to that, carotid duplex that suggested borderline significant disease in carotid systems.  CTA indicated 30 to almost 50% disease involving left common carotid with 50% disease involving the intracranial left vertebral artery.  Nonobstructive disease was noted otherwise.  Medical management was recommended.  Post tenting, the patient has done well.  There was concern because of increasing dysrhythmic symptoms preintervention.  Those are persistent but improved for the most part post intervention of the RCA.  He has no sustained dysrhythmic  activity now.  He reports only rare chest pain now.  Dyspnea and fatigue appears to be far less severe.  He has no failure symptoms.  He has no further complaints and feels that he is doing well.  Of note, cath site is very much stable.    Current Outpatient Medications on File Prior to Visit   Medication Sig Dispense Refill    allopurinol (ZYLOPRIM) 300 MG tablet Take 1.5 tablets by mouth Daily. 1 1/2      ARIPiprazole (ABILIFY) 15 MG tablet Take 0.5 tablets by mouth Daily.      aspirin 81 MG EC tablet Take 1 tablet by mouth Daily.      carvedilol (COREG) 12.5 MG tablet Take 0.5 tablets by mouth 2 (Two) Times a Day.      clopidogrel (PLAVIX) 75 MG tablet Take 1 tablet by mouth Daily.      cyclobenzaprine (FLEXERIL) 10 MG tablet Take 1 tablet by mouth 3 (Three) Times a Day As Needed for Muscle Spasms.      DULoxetine (CYMBALTA) 30 MG capsule Take 1 capsule by mouth Every Morning.      DULoxetine (CYMBALTA) 30 MG capsule Take 2 capsules by mouth Every Night.      furosemide (LASIX) 40 MG tablet Take 1 tablet by mouth Daily.      isosorbide mononitrate (IMDUR) 30 MG 24 hr tablet Take 1 tablet by mouth Daily.      lisinopril (PRINIVIL,ZESTRIL) 5 MG tablet Take 1 tablet by mouth Every Night.      nitroglycerin (NITROSTAT) 0.4 MG SL tablet 1 under the tongue as needed for angina, may repeat q5mins for up three doses 25 tablet 2    omeprazole (priLOSEC) 20 MG capsule Take 1 capsule by mouth Daily.      prazosin (MINIPRESS) 2 MG capsule Take 4 capsules by mouth Every Night.      rosuvastatin (CRESTOR) 40 MG tablet Take 1 tablet by mouth Daily.      traZODone (DESYREL) 100 MG tablet Take 2.5 tablets by mouth Every Night.       No current facility-administered medications on file prior to visit.       Erythromycin    Past Medical History:   Diagnosis Date    Asthma     Chronic kidney disease     Colon cancer     colon cancer    COPD (chronic obstructive pulmonary disease)     Coronary artery disease     Diabetes mellitus      diet controlled    Gout     Heart attack     History of transfusion     after knee surgery and with chemo, no reaction    Hyperlipidemia     Hypertension     Neuropathy     Sleep apnea     wears cpap with 2 liters at night    Stroke     , left sided weakness and periodic muscle jerking       Social History     Socioeconomic History    Marital status:    Tobacco Use    Smoking status: Former     Packs/day: 2.00     Years: 40.00     Additional pack years: 0.00     Total pack years: 80.00     Types: Cigarettes     Quit date:      Years since quittin.8    Smokeless tobacco: Never   Vaping Use    Vaping Use: Never used   Substance and Sexual Activity    Alcohol use: Not Currently     Comment:     Drug use: Not Currently     Types: Marijuana     Comment: last use     Sexual activity: Defer       Family History   Problem Relation Age of Onset    Hypertension Mother     Heart disease Mother     Diabetes Mother     Hyperlipidemia Mother     Hypertension Father     Heart disease Father     Hyperlipidemia Father        Review of Systems   Constitutional: Negative.  Negative for chills, diaphoresis, fatigue and fever.   HENT: Negative.     Eyes: Negative.  Negative for visual disturbance.   Respiratory:  Positive for apnea and cough. Negative for chest tightness, shortness of breath and wheezing.    Cardiovascular:  Positive for palpitations and leg swelling. Negative for chest pain.   Gastrointestinal: Negative.  Negative for abdominal pain and blood in stool.   Endocrine: Negative.    Genitourinary: Negative.  Negative for hematuria.   Musculoskeletal:  Positive for arthralgias. Negative for back pain, myalgias, neck pain and neck stiffness.   Skin: Negative.  Negative for rash and wound.   Allergic/Immunologic: Negative.  Negative for environmental allergies and food allergies.   Neurological:  Positive for light-headedness and numbness (tingling in hands). Negative for dizziness, syncope,  "weakness and headaches.   Hematological:  Bruises/bleeds easily.   Psychiatric/Behavioral:  Positive for sleep disturbance.        Objective   Vitals:    11/16/23 0856   BP: 96/58   BP Location: Left arm   Patient Position: Sitting   Pulse: 64   SpO2: 95%   Weight: 112 kg (246 lb)   Height: 177.8 cm (70\")      BP 96/58 (BP Location: Left arm, Patient Position: Sitting)   Pulse 64   Ht 177.8 cm (70\")   Wt 112 kg (246 lb)   SpO2 95%   BMI 35.30 kg/m²    Lab Results (most recent)       None          Physical Exam  Vitals and nursing note reviewed.   Constitutional:       General: He is not in acute distress.     Appearance: He is well-developed.   HENT:      Head: Normocephalic and atraumatic.   Eyes:      Conjunctiva/sclera: Conjunctivae normal.      Pupils: Pupils are equal, round, and reactive to light.   Neck:      Vascular: No JVD.      Trachea: No tracheal deviation.   Cardiovascular:      Rate and Rhythm: Normal rate and regular rhythm.      Heart sounds: Normal heart sounds.   Pulmonary:      Effort: Pulmonary effort is normal.      Breath sounds: Normal breath sounds.   Abdominal:      General: Bowel sounds are normal. There is no distension.      Palpations: Abdomen is soft. There is no mass.      Tenderness: There is no abdominal tenderness. There is no guarding or rebound.   Musculoskeletal:         General: No tenderness or deformity. Normal range of motion.      Cervical back: Normal range of motion and neck supple.   Skin:     General: Skin is warm and dry.      Coloration: Skin is not pale.      Findings: No erythema or rash.   Neurological:      Mental Status: He is alert and oriented to person, place, and time.   Psychiatric:         Behavior: Behavior normal.         Thought Content: Thought content normal.         Judgment: Judgment normal.           Procedure   Procedures       Assessment & Plan      Diagnosis Plan   1. Coronary artery disease involving native coronary artery of native heart " with angina pectoris        2. Shortness of breath        3. Palpitations          1.  At this time, the patient appears to be doing fairly well.  He is improved post recent shockwave lithotripsy and follow-up intervention for ostial chronic occlusion of the RCA.  Anatomy otherwise was stable with regards to cath findings.  He will be managed medically in that regard.  He has improved postintervention.  He has far less chest pain.  Dyspnea and fatigue appear to be less.  Even palpitations appear to have minimized.    2.  Palpitations been one of his concern as of recent.  Prior monitor indicates PACs, PVCs, and short episodes of SVT, which appear to correlate well to patient's symptoms.  We have discussed further suppressive therapy.  I would hold on that for now as he is overall improved postintervention.  Should that be noted or certainly progress in the future, we will again revisit suppressive therapies.    3.  Otherwise, as the patient is overall improved and feels much better postintervention, we will continue current medical regimen as he has done well on the same.  He will return immediately for any issues.  We will continue to see him in 3 to 6-month intervals, sooner for complications.  He will call for any issues.  I feel he is doing well for now.           Advance Care Planning   ACP discussion was held with the patient during this visit. Patient does not have an advance directive, declines further assistance.       Patient did not bring med list or medicine bottles to appointment, med list has been reviewed and updated based on patient's knowledge of their meds.      Electronically signed by:

## 2024-02-08 ENCOUNTER — TELEPHONE (OUTPATIENT)
Dept: CARDIOLOGY | Facility: CLINIC | Age: 69
End: 2024-02-08
Payer: MEDICARE

## 2024-02-08 NOTE — TELEPHONE ENCOUNTER
Caller: [facility]    Relationship:     Best call back number:[phone]    What form or medical record are you requesting: LAST OFFICE  NOTE AND CATH REQUEST    Who is requesting this form or medical record [facility] you: [facility]    How would you like to receive the form or medical records (pick-up, mail, fax): FAX  If fax, what is the fax number:[phone]  If mail, what is the address:   If pick-up, provide patient with address and location details    Timeframe paperwork needed:     Additional notes:

## 2024-02-08 NOTE — TELEPHONE ENCOUNTER
Called and spoke with Rachel @ [facility] who states pt is seeing a Cardiologist @ [facility] and here.  She said she is going to call the pt and verify which cardiologist she wants to follow his care.  No documentation in this pt's chart indicated a  status.  Verified all pt info with Rachel and requested records faxed for continuum of care

## 2024-02-28 ENCOUNTER — TELEPHONE (OUTPATIENT)
Dept: CARDIOLOGY | Facility: CLINIC | Age: 69
End: 2024-02-28
Payer: MEDICARE

## 2024-02-28 NOTE — TELEPHONE ENCOUNTER
[facility] seen by [facility] today and told he has bladder cancer and possibly prostate cancer. He was told he would need to be seen by John Barnard PA-C to discuss surgery approval and holding medications. Patient has had no change in cardiac symptoms since last seen in November for cath/stenting follow up. Explained [facility] will need to [facility] cardiac clearance request for review. Typically appointment for clearance is not needed if seen recently with no change in symptoms. If follow up is required per John Barnard PA-C, he will be called with appointment.

## 2024-03-05 ENCOUNTER — TELEPHONE (OUTPATIENT)
Dept: CARDIOLOGY | Facility: CLINIC | Age: 69
End: 2024-03-05
Payer: MEDICARE

## 2024-03-05 NOTE — TELEPHONE ENCOUNTER
Received cardiac clearance request from [facility] stating pt has TURP/TURBT scheduled for 03/21/2024 and is requiring a cardiac clearance. Placed cardiac clearance request in Katelyn' inbox to review and address with provider.

## 2024-04-24 ENCOUNTER — TELEPHONE (OUTPATIENT)
Dept: CARDIOLOGY | Facility: CLINIC | Age: 69
End: 2024-04-24
Payer: MEDICARE

## 2024-04-24 NOTE — TELEPHONE ENCOUNTER
Per John Barnard PA may take lasix as needed for 3 day interval's as needed.    Spouse notified of recommendation's and to contact office if swelling does not improve or worsening of symptom's.

## 2024-04-24 NOTE — TELEPHONE ENCOUNTER
Spouse reports that patient has swelling in right hand, arm and leg very swollen, reports shortness of breath, [facility], reports patient took lasix 40 yesterday only 1 dose (was instructed not to take by John Barnard). Had spouse to check arm and hand to see if was warm or cold to touch, states no warmth or coldness noted. Will forward to John SWANSON for recommendation's.

## 2024-08-05 ENCOUNTER — TELEPHONE (OUTPATIENT)
Dept: CARDIOLOGY | Facility: CLINIC | Age: 69
End: 2024-08-05

## 2024-08-05 NOTE — TELEPHONE ENCOUNTER
Caller: bert mckeon    Relationship to patient: Emergency Contact    Best call back number: [phone]     Chief complaint: PT HAD STENT PLACED IN NOV, NEVER F/U DUE TO DX OF CANCER     Type of visit: F/U     Requested date: ASAP     Additional notes: LAST SEEN 11/16/23, PN STATES Return for Next scheduled follow up.

## 2024-08-06 ENCOUNTER — OFFICE VISIT (OUTPATIENT)
Dept: CARDIOLOGY | Facility: CLINIC | Age: 69
End: 2024-08-06
Payer: MEDICARE

## 2024-08-06 VITALS
HEART RATE: 104 BPM | DIASTOLIC BLOOD PRESSURE: 87 MMHG | HEIGHT: 70 IN | WEIGHT: 256 LBS | BODY MASS INDEX: 36.65 KG/M2 | SYSTOLIC BLOOD PRESSURE: 129 MMHG | OXYGEN SATURATION: 94 %

## 2024-08-06 DIAGNOSIS — R60.0 EDEMA LEG: ICD-10-CM

## 2024-08-06 DIAGNOSIS — I25.119 CORONARY ARTERY DISEASE INVOLVING NATIVE CORONARY ARTERY OF NATIVE HEART WITH ANGINA PECTORIS: Primary | ICD-10-CM

## 2024-08-06 DIAGNOSIS — R06.02 SHORTNESS OF BREATH: ICD-10-CM

## 2024-08-06 PROCEDURE — 3074F SYST BP LT 130 MM HG: CPT | Performed by: PHYSICIAN ASSISTANT

## 2024-08-06 PROCEDURE — 99213 OFFICE O/P EST LOW 20 MIN: CPT | Performed by: PHYSICIAN ASSISTANT

## 2024-08-06 PROCEDURE — 1160F RVW MEDS BY RX/DR IN RCRD: CPT | Performed by: PHYSICIAN ASSISTANT

## 2024-08-06 PROCEDURE — 93000 ELECTROCARDIOGRAM COMPLETE: CPT | Performed by: PHYSICIAN ASSISTANT

## 2024-08-06 PROCEDURE — 3079F DIAST BP 80-89 MM HG: CPT | Performed by: PHYSICIAN ASSISTANT

## 2024-08-06 PROCEDURE — 1159F MED LIST DOCD IN RCRD: CPT | Performed by: PHYSICIAN ASSISTANT

## 2024-08-06 RX ORDER — FLUTICASONE FUROATE, UMECLIDINIUM BROMIDE AND VILANTEROL TRIFENATATE 100; 62.5; 25 UG/1; UG/1; UG/1
1 POWDER RESPIRATORY (INHALATION)
COMMUNITY

## 2024-08-06 RX ORDER — INSULIN GLARGINE 100 [IU]/ML
13 INJECTION, SOLUTION SUBCUTANEOUS NIGHTLY
COMMUNITY

## 2024-08-06 RX ORDER — FINASTERIDE 5 MG/1
5 TABLET, FILM COATED ORAL DAILY
COMMUNITY

## 2024-08-06 RX ORDER — GABAPENTIN 100 MG/1
200 CAPSULE ORAL NIGHTLY
COMMUNITY

## 2024-08-06 RX ORDER — DULOXETIN HYDROCHLORIDE 60 MG/1
60 CAPSULE, DELAYED RELEASE ORAL 2 TIMES DAILY
COMMUNITY

## 2024-08-06 RX ORDER — CARVEDILOL 6.25 MG/1
6.25 TABLET ORAL 2 TIMES DAILY WITH MEALS
COMMUNITY

## 2024-08-06 RX ORDER — CLONAZEPAM 0.5 MG/1
0.5 TABLET ORAL DAILY
COMMUNITY

## 2024-08-06 NOTE — PROGRESS NOTES
Problem list     Subjective   Shayne Neal is a 69 y.o. male     Chief Complaint   Patient presents with    Follow-up     Hospital follow up - patient reports was admitted to [facility] in [place] with sepsis,     Edema     Patient has swelling in upper and lower extremities   Problem List:     1. Coronary artery disease with history of CABG and multiple stenting procedures otherwise.  1.1 Cath, August 2011 indicated patent DIEGO to LAD, patent stent and vein graft to circumflex, and patent stent to the RCA. Vein graft to the RCA was occluded. Medical management was recommended.  1.2.  Recent cath, March 2015 in the setting of non-ST elevation MI with thrombectomy in the RCA and nonobstructive disease otherwise.   1.3.  Catheterization, 11/2023, because of low level symptoms, and equivocal stress test findings.  The patient had subsequent stenting of severely calcified total occlusion of the ostium of the RCA.  He had shockwave lithotripsy performed with follow-up stenting.  Anatomy otherwise was felt stable when compared to prior study.  2. Hypertension  3. Dyslipidemia  4. Diabetes mellitus  5. Chronic kidney disease      6. COPD       HPI    The patient presents to the clinic today for follow-up.  This pleasant gentleman was recently hospitalized for numerous ongoing noncardiac issues.  He eventually was diagnosed with sepsis.  He had urologic evaluation where he had previously had a procedure.  He had numerous complications related to the same.  He was treated accordingly and eventually discharged.  There was significant edema, which was addressed accordingly.  From general cardiovascular standpoint, the patient has stable angina.  He denies PND nor orthopnea.  Edema continues to be a recurrent issue, for which he titrates diuretic regimen at home.  He reports that he had an echo during hospitalization and was told that his EF had declined slightly.  I have requested copies of that so I can recommend  further and review otherwise.  He has no dysrhythmic symptoms.  To his knowledge he typically is normotensive, although this fluctuates greatly.  He has no further complaints.  [facility] and his primary care provider otherwise.  Current Outpatient Medications on File Prior to Visit   Medication Sig Dispense Refill    allopurinol (ZYLOPRIM) 300 MG tablet Take 1.5 tablets by mouth Daily. 1 1/2      carvedilol (COREG) 6.25 MG tablet Take 1 tablet by mouth 2 (Two) Times a Day With Meals.      clonazePAM (KlonoPIN) 0.5 MG tablet Take 1 tablet by mouth Daily.      clopidogrel (PLAVIX) 75 MG tablet Take 1 tablet by mouth Daily.      DULoxetine (CYMBALTA) 60 MG capsule Take 1 capsule by mouth 2 (Two) Times a Day.      empagliflozin (JARDIANCE) 10 MG tablet tablet Take 1 tablet by mouth Daily.      finasteride (PROSCAR) 5 MG tablet Take 1 tablet by mouth Daily.      Fluticasone-Umeclidin-Vilant (Trelegy Ellipta) 100-62.5-25 MCG/ACT inhaler Inhale 1 puff Daily.      furosemide (LASIX) 40 MG tablet Take 1 tablet by mouth Daily.      gabapentin (NEURONTIN) 100 MG capsule Take 2 capsules by mouth Every Night.      insulin aspart (novoLOG FLEXPEN) 100 UNIT/ML solution pen-injector sc pen Inject 2 Units under the skin into the appropriate area as directed 3 (Three) Times a Day With Meals.      insulin glargine (LANTUS, SEMGLEE) 100 UNIT/ML injection Inject 13 Units under the skin into the appropriate area as directed Every Night.      nitroglycerin (NITROSTAT) 0.4 MG SL tablet 1 under the tongue as needed for angina, may repeat q5mins for up three doses 25 tablet 2    omeprazole (priLOSEC) 20 MG capsule Take 1 capsule by mouth Daily.      prazosin (MINIPRESS) 2 MG capsule Take 4 capsules by mouth Every Night.      rosuvastatin (CRESTOR) 40 MG tablet Take 1 tablet by mouth Daily.      traZODone (DESYREL) 100 MG tablet Take 2.5 tablets by mouth Every Night.       No current facility-administered  medications on file prior to visit.       Erythromycin    Past Medical History:   Diagnosis Date    Asthma     Bladder cancer 2024    Chronic kidney disease     Colon cancer     colon cancer    COPD (chronic obstructive pulmonary disease)     Coronary artery disease     Diabetes mellitus     diet controlled    Gout     Heart attack     History of transfusion     after knee surgery and with chemo, no reaction    Hyperlipidemia     Hypertension     Neuropathy     Sleep apnea     wears cpap with 2 liters at night    Stroke     , left sided weakness and periodic muscle jerking       Social History     Socioeconomic History    Marital status:    Tobacco Use    Smoking status: Former     Current packs/day: 0.00     Average packs/day: 2.0 packs/day for 40.0 years (80.0 ttl pk-yrs)     Types: Cigarettes     Start date:      Quit date:      Years since quittin.6    Smokeless tobacco: Never   Vaping Use    Vaping status: Never Used   Substance and Sexual Activity    Alcohol use: Not Currently     Comment:     Drug use: Not Currently     Types: Marijuana     Comment: last use     Sexual activity: Defer       Family History   Problem Relation Age of Onset    Hypertension Mother     Heart disease Mother     Diabetes Mother     Hyperlipidemia Mother     Hypertension Father     Heart disease Father     Hyperlipidemia Father        Review of Systems   Constitutional: Negative.  Positive for chills, diaphoresis and fatigue. Negative for fever.   HENT: Negative.     Eyes: Negative.  Negative for visual disturbance.   Respiratory: Negative.  Positive for apnea, cough and shortness of breath. Negative for chest tightness and wheezing.    Cardiovascular: Negative.  Positive for palpitations and leg swelling. Negative for chest pain.   Gastrointestinal: Negative.  Positive for abdominal pain. Negative for blood in stool.   Endocrine: Negative.    Genitourinary: Negative.  Positive for hematuria.  "  Musculoskeletal: Negative.  Negative for arthralgias, back pain, myalgias, neck pain and neck stiffness.   Skin: Negative.  Negative for rash and wound.   Allergic/Immunologic: Negative.  Negative for environmental allergies and food allergies.   Neurological: Negative.  Positive for weakness and light-headedness. Negative for dizziness, syncope, numbness and headaches.   Hematological: Negative.  Bruises/bleeds easily (on plavix).   Psychiatric/Behavioral: Negative.  Positive for sleep disturbance (sleep apnea).        Objective   Vitals:    08/06/24 0921   BP: 129/87   Pulse: 104   SpO2: 94%   Weight: 116 kg (256 lb)   Height: 177.8 cm (70\")      /87   Pulse 104   Ht 177.8 cm (70\")   Wt 116 kg (256 lb)   SpO2 94%   BMI 36.73 kg/m²    Lab Results (most recent)       None          Physical Exam  Vitals and nursing note reviewed.   Constitutional:       General: He is not in acute distress.     Appearance: He is well-developed.   HENT:      Head: Normocephalic and atraumatic.   Eyes:      Conjunctiva/sclera: Conjunctivae normal.      Pupils: Pupils are equal, round, and reactive to light.   Neck:      Vascular: No JVD.      Trachea: No tracheal deviation.   Cardiovascular:      Rate and Rhythm: Normal rate and regular rhythm.      Heart sounds: Normal heart sounds.   Pulmonary:      Effort: Pulmonary effort is normal.      Breath sounds: Normal breath sounds.   Abdominal:      General: Bowel sounds are normal. There is no distension.      Palpations: Abdomen is soft. There is no mass.      Tenderness: There is no abdominal tenderness. There is no guarding or rebound.   Musculoskeletal:         General: No tenderness or deformity. Normal range of motion.      Cervical back: Normal range of motion and neck supple.   Skin:     General: Skin is warm and dry.      Coloration: Skin is not pale.      Findings: No erythema or rash.   Neurological:      Mental Status: He is alert and oriented to person, place, " and time.   Psychiatric:         Behavior: Behavior normal.         Thought Content: Thought content normal.         Judgment: Judgment normal.           Procedure     ECG 12 Lead    Date/Time: 8/6/2024 9:44 AM  Performed by: John Barnard PA    Authorized by: John Barnard PA  Comparison: compared with previous ECG from 10/17/2023             Assessment & Plan      Diagnosis Plan   1. Coronary artery disease involving native coronary artery of native heart with angina pectoris        2. Shortness of breath        3. Edema leg          1.  At this time, the patient appears to be doing mostly well from general cardiovascular standpoint.  He reports stable angina, mostly much improved after intervention in November.  His dyspnea remains at baseline, although limiting at moderate levels.  He has no further cardiovascular symptoms or issues.    2.  Lower extremity edema and edematous issues otherwise continue to be somewhat of an issue for him.  We again have discussed titration of diuretic regimen and lifestyle changes.  He is very knowledgeable with the same.  I would make no adjustments as he does respond reasonably well to diuretic therapy when titrated.    3.  We will continue medical regimen otherwise without change.    4.  I have requested a copy of his recent echo from his recent hospitalization.  We can review and respond accordingly as we know results.  He will return for any issues.  As he appears overall stable, we will make no adjustments and follow him long-term.           Advance Care Planning   ACP discussion was held with the patient during this visit. Patient does not have an advance directive, declines further assistance.           Electronically signed by:

## 2025-04-30 ENCOUNTER — TELEPHONE (OUTPATIENT)
Dept: CARDIOLOGY | Facility: CLINIC | Age: 70
End: 2025-04-30
Payer: MEDICARE

## 2025-04-30 NOTE — TELEPHONE ENCOUNTER
Called to sonja cancelled appt and pt stated he would call back to reschedule after appt with surgeon

## 2025-06-20 ENCOUNTER — PRE-ADMISSION TESTING (OUTPATIENT)
Dept: PREADMISSION TESTING | Facility: HOSPITAL | Age: 70
End: 2025-06-20
Payer: MEDICARE

## 2025-06-20 VITALS — WEIGHT: 249.45 LBS | HEIGHT: 70 IN | BODY MASS INDEX: 35.71 KG/M2

## 2025-06-20 LAB
ALBUMIN SERPL-MCNC: 3.9 G/DL (ref 3.5–5.2)
ALBUMIN/GLOB SERPL: 1.6 G/DL
ALP SERPL-CCNC: 62 U/L (ref 39–117)
ALT SERPL W P-5'-P-CCNC: 13 U/L (ref 1–41)
ANION GAP SERPL CALCULATED.3IONS-SCNC: 11 MMOL/L (ref 5–15)
AST SERPL-CCNC: 14 U/L (ref 1–40)
BILIRUB SERPL-MCNC: 0.4 MG/DL (ref 0–1.2)
BUN SERPL-MCNC: 17.2 MG/DL (ref 8–23)
BUN/CREAT SERPL: 11 (ref 7–25)
CALCIUM SPEC-SCNC: 8.9 MG/DL (ref 8.6–10.5)
CHLORIDE SERPL-SCNC: 104 MMOL/L (ref 98–107)
CO2 SERPL-SCNC: 25 MMOL/L (ref 22–29)
CREAT SERPL-MCNC: 1.56 MG/DL (ref 0.76–1.27)
EGFRCR SERPLBLD CKD-EPI 2021: 46.9 ML/MIN/1.73
GLOBULIN UR ELPH-MCNC: 2.5 GM/DL
GLUCOSE SERPL-MCNC: 99 MG/DL (ref 65–99)
HBA1C MFR BLD: 7.23 % (ref 4.8–5.6)
POTASSIUM SERPL-SCNC: 4.5 MMOL/L (ref 3.5–5.2)
PROT SERPL-MCNC: 6.4 G/DL (ref 6–8.5)
SODIUM SERPL-SCNC: 140 MMOL/L (ref 136–145)

## 2025-06-20 PROCEDURE — 36415 COLL VENOUS BLD VENIPUNCTURE: CPT

## 2025-06-20 PROCEDURE — 80053 COMPREHEN METABOLIC PANEL: CPT

## 2025-06-20 PROCEDURE — 83036 HEMOGLOBIN GLYCOSYLATED A1C: CPT

## 2025-06-20 RX ORDER — BUSPIRONE HYDROCHLORIDE 10 MG/1
10 TABLET ORAL DAILY
COMMUNITY

## 2025-06-20 RX ORDER — HYDROXYZINE HYDROCHLORIDE 25 MG/1
25 TABLET, FILM COATED ORAL DAILY PRN
COMMUNITY

## 2025-06-20 RX ORDER — EZETIMIBE 10 MG/1
10 TABLET ORAL DAILY
COMMUNITY

## 2025-06-20 RX ORDER — METOCLOPRAMIDE 10 MG/1
10 TABLET ORAL
COMMUNITY

## 2025-06-20 RX ORDER — BUMETANIDE 1 MG/1
1-4 TABLET ORAL DAILY PRN
COMMUNITY

## 2025-06-20 NOTE — PAT
An arrival time for procedure was not provided during PAT visit. If patient had any questions or concerns about their arrival time, they were instructed to contact their surgeon/physician.  Additionally, if the patient referred to an arrival time that was acquired from their my chart account, patient was encouraged to verify that time with their surgeon/physician. Arrival times are NOT provided in Pre Admission Testing Department.    Patient viewed general PAT education video as instructed in their preoperative information received from their surgeon.  Patient stated the general PAT education video was viewed in its entirety and survey completed.  Copies of PAT general education handouts (Incentive Spirometry, Meds to Beds Program, Patient Belongings, Pre-op skin preparation instructions, Blood Glucose testing, Visitor policy, Surgery FAQ, Code H) distributed to patient if not printed. Education related to the PAT pass and skin preparation for surgery (if applicable) completed in PAT as a reinforcement to PAT education video. Patient instructed to return PAT pass provided today as well as completed skin preparation sheet (if applicable) on the day of procedure.     Additionally if patient had not viewed video yet but intended to view it at home or in our waiting area, then referred them to the handout with QR code/link provided during PAT visit.  Instructed patient to complete survey after viewing the video in its entirety.  Encouraged patient/family to read PAT general education handouts thoroughly and notify PAT staff with any questions or concerns. Patient verbalized understanding of all information and priority content.    Patient's surgeon called in a prescription for Benzol Peroxide 5% wash and Mupirocin nasal ointment to [facility].  Patient instructed to  from [facility] pharmacy that was submitted electronically.  Verbal and written instructions given regarding proper use of the Benzoyl Peroxide  wash and Mupirocin nasal ointment were provided to patient and/or alysalily during PAT visit. Patient/family also instructed to complete Benzol Peroxide and Mupirocin checklists and return them to Pre-op on the day of surgery.  Patient and/or family verbalized understanding.      Additionally, reinforced with patient to acquire this prescription from the [facility] retail pharmacy before leaving the hospital after PAT visit due to the potential unavailability at local pharmacies.    Patient instructed to drink 20 ounces of Gatorade or Gatorlyte (if diabetic) and it needs to be completed 1 hour (for Main OR patients) or 2 hours (scheduled  section & [facility] patients) before given arrival time for procedure (NO RED Gatorade and NO Gatorade Zero).    Patient verbalized understanding.    [employer] (by [employer]) pain pump patient informational handout given to patient.  Instructed patient to watch [employer] Patient Education Video regarding Peripheral Nerve Catheter that will be in place for upcoming surgery unless contraindicated. The video can be accessed using QR code noted on handout.  Patient agreed to watch video.  Stressed to patient to call [facility] Nursing Hotline  if patient has any questions or concerns after discharge.     Post-Surgery Information Instruction Sheet given to patient during Pre-Admission Testing Visit with verbal instructions to patient to return with PAT PASS on the day of surgery. Additionally, encouraged patient to review the information provided.    Verified patient previously completed cardiology visit for cardiac risk assessment in preparation for upcoming procedure, completion of 12-lead ECG within six months, and risk assessment letter reviewed. No further interventions required.     Requested copy of EKG from 25 to be faxed to PAT department.     Pt reported a wound on his left elbow area from fall ~6 days ago. Pt stated he went to ER and wound was cleaned and dressed. Wound  covered by ace wrap in PAT; unable to assess. Pt stated wound is healing and denies being prescribed antibiotics. LM with Nanda Díaz,  for Dr. Yin, to notify of above.

## 2025-07-02 ENCOUNTER — ANESTHESIA EVENT (OUTPATIENT)
Dept: PERIOP | Facility: HOSPITAL | Age: 70
End: 2025-07-02
Payer: MEDICARE

## 2025-07-02 RX ORDER — SODIUM CHLORIDE 0.9 % (FLUSH) 0.9 %
10 SYRINGE (ML) INJECTION AS NEEDED
Status: CANCELLED | OUTPATIENT
Start: 2025-07-02

## 2025-07-02 RX ORDER — SODIUM CHLORIDE 0.9 % (FLUSH) 0.9 %
10 SYRINGE (ML) INJECTION EVERY 12 HOURS SCHEDULED
Status: CANCELLED | OUTPATIENT
Start: 2025-07-02

## 2025-07-02 RX ORDER — FAMOTIDINE 10 MG/ML
20 INJECTION, SOLUTION INTRAVENOUS ONCE
Status: CANCELLED | OUTPATIENT
Start: 2025-07-02 | End: 2025-07-02

## 2025-07-02 NOTE — ANESTHESIA PREPROCEDURE EVALUATION
Anesthesia Evaluation     Patient summary reviewed and Nursing notes reviewed   no history of anesthetic complications:   NPO Solid Status: > 8 hours  NPO Liquid Status: > 2 hours           Airway   Mallampati: III  TM distance: >3 FB  Neck ROM: full  Possible difficult intubation and Large neck circumference  Dental    (+) lower dentures and upper dentures    Pulmonary - normal exam   (+) a smoker Former, asthma,home oxygen (recc ATC 3L, does not use), sleep apnea  Cardiovascular - normal exam  Exercise tolerance: poor (<4 METS)    ECG reviewed    (+) hypertension, CAD, CABG, cardiac stents , hyperlipidemia    ROS comment: -CABG 1994 3v;   -August 2011 indicated patent DIEGO to LAD, patent stent and vein graft to circumflex, and patent stent to the RCA. Vein graft to the RCA was occluded. Medical management was recommended.  -March 2015 in the setting of non-ST elevation MI with thrombectomy in the RCA and nonobstructive disease otherwise.   -11/2023, stenting of severely calcified total occlusion of the ostium of the RCA.  He had shockwave lithotripsy performed with follow-up stenting.  Anatomy otherwise was felt stable when compared to prior study.      9/1/23- lvef 50-55, mild mod AL HoK, gr1dd, nl rvsf/sz, no asd, trivial mr/tr, rvsp 30  6/6/24-lvef 55-60, no wma, mild rv dilation, nl rvsf, rvsp 25-30, mild mr, trace tr, , no as/ai, mild pi, no effusion  10/2019-probably normal nuc stress         4/23/25-NSR; [facility] - may hold plavix 5d prior to R shoulder surgery; r/s on the day after surgery; htn well controlled, no further cardiac workup indicated prior to surgery    Neuro/Psych  (+) CVA (3 y ago, L>R sided weakness), psychiatric history  GI/Hepatic/Renal/Endo    (+) obesity, renal disease- CRI, diabetes mellitus    Musculoskeletal     Abdominal    Substance History - negative use     OB/GYN          Other      history of cancer (bladder; colon ca)    ROS/Med Hx Other: K 4.5   Ozempic 5 d  ago  [id number]  Plavix 6/28                Anesthesia Plan    ASA 4     general with block   Rapid sequence  (Risks and benefits of general anesthesia discussed with patient (including MI, CVA, death, recall, aspiration, oropharyngeal/dental damage), questions answered, agreeable to proceed.  Benefits and risks of nerve block/catheter discussed with patient ((including failed block, damage to nerve/nearby structures, intravascular injection)); questions answered, agreeable to proceed.      Discussed cardiac hx and associated elevated risk; patient acknowledged risk and desires to proceed.  ASA now. MAP 80+ at all times.     VL ready   RSI for recent GLP1 and +bloating)  intravenous induction     Anesthetic plan, risks, benefits, and alternatives have been provided, discussed and informed consent has been obtained with: patient.    Plan discussed with CRNA.    CODE STATUS:

## 2025-07-03 ENCOUNTER — APPOINTMENT (OUTPATIENT)
Dept: GENERAL RADIOLOGY | Facility: HOSPITAL | Age: 70
End: 2025-07-03
Payer: MEDICARE

## 2025-07-03 ENCOUNTER — ANESTHESIA EVENT CONVERTED (OUTPATIENT)
Dept: ANESTHESIOLOGY | Facility: HOSPITAL | Age: 70
End: 2025-07-03
Payer: MEDICARE

## 2025-07-03 ENCOUNTER — HOSPITAL ENCOUNTER (INPATIENT)
Facility: HOSPITAL | Age: 70
LOS: 1 days | Discharge: HOME | End: 2025-07-04
Attending: ORTHOPAEDIC SURGERY | Admitting: ORTHOPAEDIC SURGERY
Payer: MEDICARE

## 2025-07-03 ENCOUNTER — ANESTHESIA (OUTPATIENT)
Dept: PERIOP | Facility: HOSPITAL | Age: 70
End: 2025-07-03
Payer: MEDICARE

## 2025-07-03 DIAGNOSIS — T84.019A FAILURE OF ARTHROPLASTY, INITIAL ENCOUNTER: Primary | ICD-10-CM

## 2025-07-03 PROBLEM — Z96.611 S/P REVERSE TOTAL SHOULDER ARTHROPLASTY, RIGHT: Status: ACTIVE | Noted: 2025-07-03

## 2025-07-03 PROBLEM — N18.9 CKD (CHRONIC KIDNEY DISEASE): Status: ACTIVE | Noted: 2025-07-03

## 2025-07-03 PROBLEM — E78.5 HLD (HYPERLIPIDEMIA): Status: ACTIVE | Noted: 2025-07-03

## 2025-07-03 LAB
APTT PPP: 25.6 SECONDS (ref 22–39)
GLUCOSE BLDC GLUCOMTR-MCNC: 179 MG/DL (ref 70–130)
GLUCOSE BLDC GLUCOMTR-MCNC: 190 MG/DL (ref 70–130)
GLUCOSE BLDC GLUCOMTR-MCNC: 279 MG/DL (ref 70–130)
GLUCOSE BLDC GLUCOMTR-MCNC: 279 MG/DL (ref 70–130)
GLUCOSE BLDC GLUCOMTR-MCNC: 296 MG/DL (ref 70–130)
INR PPP: 0.94 (ref 0.89–1.12)
POTASSIUM SERPL-SCNC: 4.3 MMOL/L (ref 3.5–5.2)
PROTHROMBIN TIME: 13.1 SECONDS (ref 12.2–15.3)

## 2025-07-03 PROCEDURE — 63710000001 INSULIN LISPRO (HUMAN) PER 5 UNITS: Performed by: NURSE PRACTITIONER

## 2025-07-03 PROCEDURE — 25010000002 ONDANSETRON PER 1 MG: Performed by: ANESTHESIOLOGY

## 2025-07-03 PROCEDURE — 97535 SELF CARE MNGMENT TRAINING: CPT | Performed by: OCCUPATIONAL THERAPIST

## 2025-07-03 PROCEDURE — 25010000002 VASOPRESSIN 20 UNIT/ML SOLUTION

## 2025-07-03 PROCEDURE — 85730 THROMBOPLASTIN TIME PARTIAL: CPT

## 2025-07-03 PROCEDURE — 94799 UNLISTED PULMONARY SVC/PX: CPT

## 2025-07-03 PROCEDURE — 97110 THERAPEUTIC EXERCISES: CPT | Performed by: OCCUPATIONAL THERAPIST

## 2025-07-03 PROCEDURE — 85610 PROTHROMBIN TIME: CPT

## 2025-07-03 PROCEDURE — 25010000002 ROPIVACAINE HCL-NACL 0.2-0.9 % SOLUTION: Performed by: NURSE ANESTHETIST, CERTIFIED REGISTERED

## 2025-07-03 PROCEDURE — C1713 ANCHOR/SCREW BN/BN,TIS/BN: HCPCS | Performed by: ORTHOPAEDIC SURGERY

## 2025-07-03 PROCEDURE — 25010000002 CEFAZOLIN PER 500 MG: Performed by: ORTHOPAEDIC SURGERY

## 2025-07-03 PROCEDURE — 97165 OT EVAL LOW COMPLEX 30 MIN: CPT | Performed by: OCCUPATIONAL THERAPIST

## 2025-07-03 PROCEDURE — 94640 AIRWAY INHALATION TREATMENT: CPT

## 2025-07-03 PROCEDURE — 25010000002 ALBUMIN HUMAN 5% PER 50 ML: Performed by: ANESTHESIOLOGY

## 2025-07-03 PROCEDURE — C1776 JOINT DEVICE (IMPLANTABLE): HCPCS | Performed by: ORTHOPAEDIC SURGERY

## 2025-07-03 PROCEDURE — 25010000002 ONDANSETRON PER 1 MG

## 2025-07-03 PROCEDURE — 36415 COLL VENOUS BLD VENIPUNCTURE: CPT

## 2025-07-03 PROCEDURE — 25010000002 LIDOCAINE PF 1% 1 % SOLUTION: Performed by: ANESTHESIOLOGY

## 2025-07-03 PROCEDURE — 73020 X-RAY EXAM OF SHOULDER: CPT

## 2025-07-03 PROCEDURE — 82948 REAGENT STRIP/BLOOD GLUCOSE: CPT

## 2025-07-03 PROCEDURE — 25010000002 DEXAMETHASONE SODIUM PHOSPHATE 10 MG/ML SOLUTION: Performed by: NURSE ANESTHETIST, CERTIFIED REGISTERED

## 2025-07-03 PROCEDURE — 97551 CAREGIVER TRAING EA ADDL 15: CPT | Performed by: OCCUPATIONAL THERAPIST

## 2025-07-03 PROCEDURE — 25010000002 DEXAMETHASONE PER 1 MG: Performed by: ANESTHESIOLOGY

## 2025-07-03 PROCEDURE — 94664 DEMO&/EVAL PT USE INHALER: CPT

## 2025-07-03 PROCEDURE — 25010000002 PHENYLEPHRINE 10 MG/ML SOLUTION 1 ML VIAL

## 2025-07-03 PROCEDURE — 25010000002 GLYCOPYRROLATE 1 MG/5ML SOLUTION: Performed by: ANESTHESIOLOGY

## 2025-07-03 PROCEDURE — 25010000002 SUGAMMADEX 200 MG/2ML SOLUTION: Performed by: ANESTHESIOLOGY

## 2025-07-03 PROCEDURE — 25010000002 PROPOFOL 10 MG/ML EMULSION: Performed by: ANESTHESIOLOGY

## 2025-07-03 PROCEDURE — 0RRJ00Z REPLACEMENT OF RIGHT SHOULDER JOINT WITH REVERSE BALL AND SOCKET SYNTHETIC SUBSTITUTE, OPEN APPROACH: ICD-10-PCS | Performed by: ORTHOPAEDIC SURGERY

## 2025-07-03 PROCEDURE — 0RPJ0JZ REMOVAL OF SYNTHETIC SUBSTITUTE FROM RIGHT SHOULDER JOINT, OPEN APPROACH: ICD-10-PCS | Performed by: ORTHOPAEDIC SURGERY

## 2025-07-03 PROCEDURE — 25010000002 FENTANYL CITRATE (PF) 50 MCG/ML SOLUTION

## 2025-07-03 PROCEDURE — 84132 ASSAY OF SERUM POTASSIUM: CPT | Performed by: ANESTHESIOLOGY

## 2025-07-03 PROCEDURE — 25010000002 HALOPERIDOL LACTATE PER 5 MG

## 2025-07-03 PROCEDURE — 25010000002 FENTANYL CITRATE (PF) 50 MCG/ML SOLUTION: Performed by: NURSE ANESTHETIST, CERTIFIED REGISTERED

## 2025-07-03 PROCEDURE — 97162 PT EVAL MOD COMPLEX 30 MIN: CPT

## 2025-07-03 PROCEDURE — 97550 CAREGIVER TRAING 1ST 30 MIN: CPT | Performed by: OCCUPATIONAL THERAPIST

## 2025-07-03 PROCEDURE — 25810000003 LACTATED RINGERS PER 1000 ML: Performed by: ANESTHESIOLOGY

## 2025-07-03 PROCEDURE — 25010000002 BUPIVACAINE (PF) 0.25 % SOLUTION: Performed by: NURSE ANESTHETIST, CERTIFIED REGISTERED

## 2025-07-03 PROCEDURE — P9041 ALBUMIN (HUMAN),5%, 50ML: HCPCS | Performed by: ANESTHESIOLOGY

## 2025-07-03 PROCEDURE — 25010000002 VANCOMYCIN 1 G RECONSTITUTED SOLUTION: Performed by: ORTHOPAEDIC SURGERY

## 2025-07-03 DEVICE — CUP SUT UNIVERS REVERS 39 PLS2 RT: Type: IMPLANTABLE DEVICE | Site: SHOULDER | Status: FUNCTIONAL

## 2025-07-03 DEVICE — ABSORBABLE HEMOSTAT (OXIDIZED REGENERATED CELLULOSE)
Type: IMPLANTABLE DEVICE | Site: SHOULDER | Status: FUNCTIONAL
Brand: SURGICEL

## 2025-07-03 DEVICE — GLENOSPHERE UNIVERS REVERS MODULAR L/24 39PLS4: Type: IMPLANTABLE DEVICE | Site: SHOULDER | Status: FUNCTIONAL

## 2025-07-03 DEVICE — POST MOD UNIVERSPRVERS GLEN 25MM: Type: IMPLANTABLE DEVICE | Site: SHOULDER | Status: FUNCTIONAL

## 2025-07-03 DEVICE — MATRX TISS INTERFYL FLO 1ML: Type: IMPLANTABLE DEVICE | Site: SHOULDER | Status: FUNCTIONAL

## 2025-07-03 DEVICE — SCRW NL GLEN UNIVERS REVERS PERIPH 4.5X44MM: Type: IMPLANTABLE DEVICE | Site: SHOULDER | Status: FUNCTIONAL

## 2025-07-03 DEVICE — CP SHLDR TOTL REV W/AUG: Type: IMPLANTABLE DEVICE | Site: SHOULDER | Status: FUNCTIONAL

## 2025-07-03 DEVICE — STEM HUM/SHLDR UNIVERS REVERS SZ9: Type: IMPLANTABLE DEVICE | Site: SHOULDER | Status: FUNCTIONAL

## 2025-07-03 DEVICE — LINER HUM/SHLDR UNIVERSREVERS CNSTR MD/39 PLS6MM: Type: IMPLANTABLE DEVICE | Site: SHOULDER | Status: FUNCTIONAL

## 2025-07-03 DEVICE — IMPLANTABLE DEVICE: Type: IMPLANTABLE DEVICE | Site: SHOULDER | Status: FUNCTIONAL

## 2025-07-03 DEVICE — SCRW LK GLEN UNIVERS REVERS PERIPH 5.5X40MM: Type: IMPLANTABLE DEVICE | Site: SHOULDER | Status: FUNCTIONAL

## 2025-07-03 DEVICE — SCRW NL GLEN UNIVERS REVERS PERIPH 4.5X28MM: Type: IMPLANTABLE DEVICE | Site: SHOULDER | Status: FUNCTIONAL

## 2025-07-03 DEVICE — SCRW LK GLEN UNIVERS REVERS PERIPH 5.5X28MM: Type: IMPLANTABLE DEVICE | Site: SHOULDER | Status: FUNCTIONAL

## 2025-07-03 RX ORDER — ONDANSETRON 2 MG/ML
INJECTION INTRAMUSCULAR; INTRAVENOUS
Status: COMPLETED
Start: 2025-07-03 | End: 2025-07-03

## 2025-07-03 RX ORDER — LABETALOL HYDROCHLORIDE 5 MG/ML
10 INJECTION, SOLUTION INTRAVENOUS EVERY 4 HOURS PRN
Status: DISCONTINUED | OUTPATIENT
Start: 2025-07-03 | End: 2025-07-04 | Stop reason: HOSPADM

## 2025-07-03 RX ORDER — BUSPIRONE HYDROCHLORIDE 10 MG/1
10 TABLET ORAL DAILY
Status: DISCONTINUED | OUTPATIENT
Start: 2025-07-03 | End: 2025-07-04 | Stop reason: HOSPADM

## 2025-07-03 RX ORDER — ROPIVACAINE HYDROCHLORIDE 2 MG/ML
INJECTION, SOLUTION EPIDURAL; INFILTRATION; PERINEURAL CONTINUOUS
Status: DISCONTINUED | OUTPATIENT
Start: 2025-07-03 | End: 2025-07-03

## 2025-07-03 RX ORDER — HALOPERIDOL 5 MG/ML
INJECTION INTRAMUSCULAR
Status: COMPLETED
Start: 2025-07-03 | End: 2025-07-03

## 2025-07-03 RX ORDER — DEXAMETHASONE SODIUM PHOSPHATE 4 MG/ML
INJECTION, SOLUTION INTRA-ARTICULAR; INTRALESIONAL; INTRAMUSCULAR; INTRAVENOUS; SOFT TISSUE AS NEEDED
Status: DISCONTINUED | OUTPATIENT
Start: 2025-07-03 | End: 2025-07-03 | Stop reason: SURG

## 2025-07-03 RX ORDER — ONDANSETRON 2 MG/ML
4 INJECTION INTRAMUSCULAR; INTRAVENOUS ONCE AS NEEDED
Status: COMPLETED | OUTPATIENT
Start: 2025-07-03 | End: 2025-07-03

## 2025-07-03 RX ORDER — TRANEXAMIC ACID 10 MG/ML
1000 INJECTION, SOLUTION INTRAVENOUS ONCE
Status: CANCELLED | OUTPATIENT
Start: 2025-07-03 | End: 2025-07-03

## 2025-07-03 RX ORDER — SODIUM CHLORIDE 450 MG/100ML
50 INJECTION, SOLUTION INTRAVENOUS CONTINUOUS
Status: DISCONTINUED | OUTPATIENT
Start: 2025-07-03 | End: 2025-07-04 | Stop reason: HOSPADM

## 2025-07-03 RX ORDER — OXYCODONE HYDROCHLORIDE 5 MG/1
5 TABLET ORAL EVERY 4 HOURS PRN
Qty: 40 TABLET | Refills: 0 | Status: SHIPPED | OUTPATIENT
Start: 2025-07-03 | End: 2025-07-13

## 2025-07-03 RX ORDER — IBUPROFEN 600 MG/1
1 TABLET ORAL
Status: DISCONTINUED | OUTPATIENT
Start: 2025-07-03 | End: 2025-07-04 | Stop reason: HOSPADM

## 2025-07-03 RX ORDER — FENTANYL CITRATE 50 UG/ML
50 INJECTION, SOLUTION INTRAMUSCULAR; INTRAVENOUS
Status: DISCONTINUED | OUTPATIENT
Start: 2025-07-03 | End: 2025-07-03 | Stop reason: HOSPADM

## 2025-07-03 RX ORDER — EPHEDRINE SULFATE 50 MG/ML
INJECTION INTRAVENOUS AS NEEDED
Status: DISCONTINUED | OUTPATIENT
Start: 2025-07-03 | End: 2025-07-03 | Stop reason: SURG

## 2025-07-03 RX ORDER — GLYCOPYRROLATE 0.2 MG/ML
INJECTION INTRAMUSCULAR; INTRAVENOUS AS NEEDED
Status: DISCONTINUED | OUTPATIENT
Start: 2025-07-03 | End: 2025-07-03 | Stop reason: SURG

## 2025-07-03 RX ORDER — FENTANYL CITRATE 50 UG/ML
INJECTION, SOLUTION INTRAMUSCULAR; INTRAVENOUS
Status: COMPLETED | OUTPATIENT
Start: 2025-07-03 | End: 2025-07-03

## 2025-07-03 RX ORDER — NALOXONE HCL 0.4 MG/ML
0.1 VIAL (ML) INJECTION
Status: DISCONTINUED | OUTPATIENT
Start: 2025-07-03 | End: 2025-07-04 | Stop reason: HOSPADM

## 2025-07-03 RX ORDER — OXYCODONE HYDROCHLORIDE 5 MG/1
5 TABLET ORAL EVERY 4 HOURS PRN
Status: DISCONTINUED | OUTPATIENT
Start: 2025-07-03 | End: 2025-07-04 | Stop reason: HOSPADM

## 2025-07-03 RX ORDER — ASPIRIN 81 MG/1
81 TABLET ORAL ONCE
Status: COMPLETED | OUTPATIENT
Start: 2025-07-03 | End: 2025-07-03

## 2025-07-03 RX ORDER — LIDOCAINE HYDROCHLORIDE 10 MG/ML
INJECTION, SOLUTION EPIDURAL; INFILTRATION; INTRACAUDAL; PERINEURAL AS NEEDED
Status: DISCONTINUED | OUTPATIENT
Start: 2025-07-03 | End: 2025-07-03 | Stop reason: SURG

## 2025-07-03 RX ORDER — DEXTROSE MONOHYDRATE 25 G/50ML
25 INJECTION, SOLUTION INTRAVENOUS
Status: DISCONTINUED | OUTPATIENT
Start: 2025-07-03 | End: 2025-07-04 | Stop reason: HOSPADM

## 2025-07-03 RX ORDER — BUPIVACAINE HCL/0.9 % NACL/PF 0.125 %
PLASTIC BAG, INJECTION (ML) EPIDURAL AS NEEDED
Status: DISCONTINUED | OUTPATIENT
Start: 2025-07-03 | End: 2025-07-03 | Stop reason: SURG

## 2025-07-03 RX ORDER — FAMOTIDINE 20 MG/1
20 TABLET, FILM COATED ORAL ONCE
Status: COMPLETED | OUTPATIENT
Start: 2025-07-03 | End: 2025-07-03

## 2025-07-03 RX ORDER — ROPIVACAINE HYDROCHLORIDE 2 MG/ML
INJECTION, SOLUTION EPIDURAL; INFILTRATION; PERINEURAL CONTINUOUS
Status: DISCONTINUED | OUTPATIENT
Start: 2025-07-03 | End: 2025-07-04 | Stop reason: HOSPADM

## 2025-07-03 RX ORDER — METOCLOPRAMIDE 5 MG/1
10 TABLET ORAL
Status: DISCONTINUED | OUTPATIENT
Start: 2025-07-03 | End: 2025-07-04 | Stop reason: HOSPADM

## 2025-07-03 RX ORDER — TRANEXAMIC ACID 10 MG/ML
1000 INJECTION, SOLUTION INTRAVENOUS ONCE
Status: COMPLETED | OUTPATIENT
Start: 2025-07-03 | End: 2025-07-03

## 2025-07-03 RX ORDER — GABAPENTIN 100 MG/1
100 CAPSULE ORAL NIGHTLY
Status: DISCONTINUED | OUTPATIENT
Start: 2025-07-03 | End: 2025-07-04 | Stop reason: HOSPADM

## 2025-07-03 RX ORDER — HYDROMORPHONE HYDROCHLORIDE 1 MG/ML
0.5 INJECTION, SOLUTION INTRAMUSCULAR; INTRAVENOUS; SUBCUTANEOUS
Status: DISCONTINUED | OUTPATIENT
Start: 2025-07-03 | End: 2025-07-04 | Stop reason: HOSPADM

## 2025-07-03 RX ORDER — PREGABALIN 75 MG/1
75 CAPSULE ORAL ONCE
Status: COMPLETED | OUTPATIENT
Start: 2025-07-03 | End: 2025-07-03

## 2025-07-03 RX ORDER — VANCOMYCIN HYDROCHLORIDE 1 G/20ML
INJECTION, POWDER, LYOPHILIZED, FOR SOLUTION INTRAVENOUS AS NEEDED
Status: DISCONTINUED | OUTPATIENT
Start: 2025-07-03 | End: 2025-07-03 | Stop reason: HOSPADM

## 2025-07-03 RX ORDER — NICOTINE POLACRILEX 4 MG
15 LOZENGE BUCCAL
Status: DISCONTINUED | OUTPATIENT
Start: 2025-07-03 | End: 2025-07-04 | Stop reason: HOSPADM

## 2025-07-03 RX ORDER — TRAZODONE HYDROCHLORIDE 100 MG/1
300 TABLET ORAL NIGHTLY
Status: DISCONTINUED | OUTPATIENT
Start: 2025-07-03 | End: 2025-07-04 | Stop reason: HOSPADM

## 2025-07-03 RX ORDER — SODIUM CHLORIDE, SODIUM LACTATE, POTASSIUM CHLORIDE, CALCIUM CHLORIDE 600; 310; 30; 20 MG/100ML; MG/100ML; MG/100ML; MG/100ML
9 INJECTION, SOLUTION INTRAVENOUS CONTINUOUS
Status: DISCONTINUED | OUTPATIENT
Start: 2025-07-04 | End: 2025-07-03

## 2025-07-03 RX ORDER — PANTOPRAZOLE SODIUM 40 MG/1
40 TABLET, DELAYED RELEASE ORAL
Status: DISCONTINUED | OUTPATIENT
Start: 2025-07-04 | End: 2025-07-04 | Stop reason: HOSPADM

## 2025-07-03 RX ORDER — FENTANYL CITRATE 50 UG/ML
INJECTION, SOLUTION INTRAMUSCULAR; INTRAVENOUS
Status: COMPLETED
Start: 2025-07-03 | End: 2025-07-03

## 2025-07-03 RX ORDER — HALOPERIDOL 5 MG/ML
0.5 INJECTION INTRAMUSCULAR ONCE
Status: COMPLETED | OUTPATIENT
Start: 2025-07-03 | End: 2025-07-03

## 2025-07-03 RX ORDER — INSULIN LISPRO 100 [IU]/ML
2-7 INJECTION, SOLUTION INTRAVENOUS; SUBCUTANEOUS
Status: DISCONTINUED | OUTPATIENT
Start: 2025-07-03 | End: 2025-07-04 | Stop reason: HOSPADM

## 2025-07-03 RX ORDER — ROPIVACAINE HYDROCHLORIDE 2 MG/ML
1 INJECTION, SOLUTION EPIDURAL; INFILTRATION; PERINEURAL CONTINUOUS
Start: 2025-07-03

## 2025-07-03 RX ORDER — VASOPRESSIN 20 [USP'U]/ML
INJECTION, SOLUTION INTRAVENOUS AS NEEDED
Status: DISCONTINUED | OUTPATIENT
Start: 2025-07-03 | End: 2025-07-03 | Stop reason: SURG

## 2025-07-03 RX ORDER — ONDANSETRON 2 MG/ML
INJECTION INTRAMUSCULAR; INTRAVENOUS AS NEEDED
Status: DISCONTINUED | OUTPATIENT
Start: 2025-07-03 | End: 2025-07-03 | Stop reason: SURG

## 2025-07-03 RX ORDER — BUPIVACAINE HYDROCHLORIDE 2.5 MG/ML
INJECTION, SOLUTION EPIDURAL; INFILTRATION; INTRACAUDAL; PERINEURAL
Status: COMPLETED | OUTPATIENT
Start: 2025-07-03 | End: 2025-07-03

## 2025-07-03 RX ORDER — ALBUMIN HUMAN 50 G/1000ML
SOLUTION INTRAVENOUS CONTINUOUS PRN
Status: DISCONTINUED | OUTPATIENT
Start: 2025-07-03 | End: 2025-07-03 | Stop reason: SURG

## 2025-07-03 RX ORDER — ACETAMINOPHEN 650 MG/1
650 SUPPOSITORY RECTAL EVERY 4 HOURS PRN
Status: DISCONTINUED | OUTPATIENT
Start: 2025-07-03 | End: 2025-07-04 | Stop reason: HOSPADM

## 2025-07-03 RX ORDER — DULOXETIN HYDROCHLORIDE 60 MG/1
60 CAPSULE, DELAYED RELEASE ORAL 2 TIMES DAILY
Status: DISCONTINUED | OUTPATIENT
Start: 2025-07-03 | End: 2025-07-04 | Stop reason: HOSPADM

## 2025-07-03 RX ORDER — ROCURONIUM BROMIDE 10 MG/ML
INJECTION, SOLUTION INTRAVENOUS AS NEEDED
Status: DISCONTINUED | OUTPATIENT
Start: 2025-07-03 | End: 2025-07-03 | Stop reason: SURG

## 2025-07-03 RX ORDER — BUDESONIDE AND FORMOTEROL FUMARATE DIHYDRATE 160; 4.5 UG/1; UG/1
2 AEROSOL RESPIRATORY (INHALATION)
Status: DISCONTINUED | OUTPATIENT
Start: 2025-07-03 | End: 2025-07-04 | Stop reason: HOSPADM

## 2025-07-03 RX ORDER — CARVEDILOL 6.25 MG/1
6.25 TABLET ORAL 2 TIMES DAILY WITH MEALS
Status: DISCONTINUED | OUTPATIENT
Start: 2025-07-03 | End: 2025-07-04 | Stop reason: HOSPADM

## 2025-07-03 RX ORDER — MIDAZOLAM HYDROCHLORIDE 1 MG/ML
0.5 INJECTION, SOLUTION INTRAMUSCULAR; INTRAVENOUS
Status: DISCONTINUED | OUTPATIENT
Start: 2025-07-03 | End: 2025-07-03 | Stop reason: HOSPADM

## 2025-07-03 RX ORDER — HYDROMORPHONE HYDROCHLORIDE 1 MG/ML
0.5 INJECTION, SOLUTION INTRAMUSCULAR; INTRAVENOUS; SUBCUTANEOUS
Status: DISCONTINUED | OUTPATIENT
Start: 2025-07-03 | End: 2025-07-03 | Stop reason: HOSPADM

## 2025-07-03 RX ORDER — PRAZOSIN HYDROCHLORIDE 1 MG/1
10 CAPSULE ORAL NIGHTLY
Status: DISCONTINUED | OUTPATIENT
Start: 2025-07-03 | End: 2025-07-04 | Stop reason: HOSPADM

## 2025-07-03 RX ORDER — ACETAMINOPHEN 325 MG/1
650 TABLET ORAL EVERY 4 HOURS PRN
Status: DISCONTINUED | OUTPATIENT
Start: 2025-07-03 | End: 2025-07-04 | Stop reason: HOSPADM

## 2025-07-03 RX ORDER — ONDANSETRON 4 MG/1
4 TABLET, ORALLY DISINTEGRATING ORAL EVERY 6 HOURS PRN
Status: DISCONTINUED | OUTPATIENT
Start: 2025-07-03 | End: 2025-07-04 | Stop reason: HOSPADM

## 2025-07-03 RX ORDER — DEXAMETHASONE SODIUM PHOSPHATE 10 MG/ML
INJECTION, SOLUTION INTRAMUSCULAR; INTRAVENOUS
Status: COMPLETED | OUTPATIENT
Start: 2025-07-03 | End: 2025-07-03

## 2025-07-03 RX ORDER — PROPOFOL 10 MG/ML
VIAL (ML) INTRAVENOUS AS NEEDED
Status: DISCONTINUED | OUTPATIENT
Start: 2025-07-03 | End: 2025-07-03 | Stop reason: SURG

## 2025-07-03 RX ORDER — LIDOCAINE HYDROCHLORIDE 10 MG/ML
0.5 INJECTION, SOLUTION EPIDURAL; INFILTRATION; INTRACAUDAL; PERINEURAL ONCE AS NEEDED
Status: DISCONTINUED | OUTPATIENT
Start: 2025-07-03 | End: 2025-07-03 | Stop reason: HOSPADM

## 2025-07-03 RX ORDER — CLONAZEPAM 0.5 MG/1
0.5 TABLET ORAL 2 TIMES DAILY PRN
Status: DISCONTINUED | OUTPATIENT
Start: 2025-07-03 | End: 2025-07-04 | Stop reason: HOSPADM

## 2025-07-03 RX ORDER — FINASTERIDE 5 MG/1
5 TABLET, FILM COATED ORAL DAILY
Status: DISCONTINUED | OUTPATIENT
Start: 2025-07-04 | End: 2025-07-04 | Stop reason: HOSPADM

## 2025-07-03 RX ORDER — ACETAMINOPHEN 500 MG
1000 TABLET ORAL ONCE
Status: COMPLETED | OUTPATIENT
Start: 2025-07-03 | End: 2025-07-03

## 2025-07-03 RX ORDER — ONDANSETRON 2 MG/ML
4 INJECTION INTRAMUSCULAR; INTRAVENOUS EVERY 6 HOURS PRN
Status: DISCONTINUED | OUTPATIENT
Start: 2025-07-03 | End: 2025-07-04 | Stop reason: HOSPADM

## 2025-07-03 RX ORDER — ROSUVASTATIN CALCIUM 20 MG/1
40 TABLET, COATED ORAL NIGHTLY
Status: DISCONTINUED | OUTPATIENT
Start: 2025-07-03 | End: 2025-07-04 | Stop reason: HOSPADM

## 2025-07-03 RX ORDER — DOCUSATE SODIUM 100 MG/1
100 CAPSULE, LIQUID FILLED ORAL 2 TIMES DAILY
Qty: 60 CAPSULE | Refills: 0 | Status: SHIPPED | OUTPATIENT
Start: 2025-07-03

## 2025-07-03 RX ADMIN — DEXAMETHASONE SODIUM PHOSPHATE 4 MG: 4 INJECTION, SOLUTION INTRA-ARTICULAR; INTRALESIONAL; INTRAMUSCULAR; INTRAVENOUS; SOFT TISSUE at 07:41

## 2025-07-03 RX ADMIN — GABAPENTIN 100 MG: 100 CAPSULE ORAL at 20:02

## 2025-07-03 RX ADMIN — BUPIVACAINE HYDROCHLORIDE 30 ML: 2.5 INJECTION, SOLUTION EPIDURAL; INFILTRATION; INTRACAUDAL; PERINEURAL at 07:08

## 2025-07-03 RX ADMIN — ONDANSETRON 4 MG: 2 INJECTION, SOLUTION INTRAMUSCULAR; INTRAVENOUS at 09:55

## 2025-07-03 RX ADMIN — BUDESONIDE AND FORMOTEROL FUMARATE DIHYDRATE 2 PUFF: 160; 4.5 AEROSOL RESPIRATORY (INHALATION) at 20:39

## 2025-07-03 RX ADMIN — SODIUM CHLORIDE, POTASSIUM CHLORIDE, SODIUM LACTATE AND CALCIUM CHLORIDE 9 ML/HR: 600; 310; 30; 20 INJECTION, SOLUTION INTRAVENOUS at 06:23

## 2025-07-03 RX ADMIN — ROSUVASTATIN 40 MG: 20 TABLET, FILM COATED ORAL at 20:02

## 2025-07-03 RX ADMIN — VASOPRESSIN 2 UNITS: 20 INJECTION INTRAVENOUS at 08:04

## 2025-07-03 RX ADMIN — EPHEDRINE SULFATE 10 MG: 50 INJECTION INTRAVENOUS at 07:50

## 2025-07-03 RX ADMIN — FENTANYL CITRATE 50 MCG: 50 INJECTION, SOLUTION INTRAMUSCULAR; INTRAVENOUS at 10:25

## 2025-07-03 RX ADMIN — VASOPRESSIN 1 UNITS: 20 INJECTION INTRAVENOUS at 08:31

## 2025-07-03 RX ADMIN — VASOPRESSIN 2 UNITS: 20 INJECTION INTRAVENOUS at 08:49

## 2025-07-03 RX ADMIN — EPHEDRINE SULFATE 20 MG: 50 INJECTION INTRAVENOUS at 07:58

## 2025-07-03 RX ADMIN — Medication 1000 MG: at 09:52

## 2025-07-03 RX ADMIN — PROPOFOL 200 MG: 10 INJECTION, EMULSION INTRAVENOUS at 07:36

## 2025-07-03 RX ADMIN — BUPIVACAINE HYDROCHLORIDE 6 ML: 2.5 INJECTION, SOLUTION EPIDURAL; INFILTRATION; INTRACAUDAL; PERINEURAL at 06:54

## 2025-07-03 RX ADMIN — TRANEXAMIC ACID 1000 MG: 10 INJECTION, SOLUTION INTRAVENOUS at 09:02

## 2025-07-03 RX ADMIN — PHENYLEPHRINE HYDROCHLORIDE 1 MCG/KG/MIN: 10 INJECTION INTRAVENOUS at 07:45

## 2025-07-03 RX ADMIN — SODIUM CHLORIDE 2000 MG: 900 INJECTION INTRAVENOUS at 15:40

## 2025-07-03 RX ADMIN — ONDANSETRON 4 MG: 2 INJECTION, SOLUTION INTRAMUSCULAR; INTRAVENOUS at 09:04

## 2025-07-03 RX ADMIN — INSULIN LISPRO 4 UNITS: 100 INJECTION, SOLUTION INTRAVENOUS; SUBCUTANEOUS at 17:46

## 2025-07-03 RX ADMIN — LIDOCAINE HYDROCHLORIDE 10 MG: 10 INJECTION, SOLUTION EPIDURAL; INFILTRATION; INTRACAUDAL; PERINEURAL at 07:36

## 2025-07-03 RX ADMIN — OXYCODONE 5 MG: 5 TABLET ORAL at 15:38

## 2025-07-03 RX ADMIN — TRANEXAMIC ACID 1000 MG: 10 INJECTION, SOLUTION INTRAVENOUS at 07:50

## 2025-07-03 RX ADMIN — TRAZODONE HYDROCHLORIDE 300 MG: 100 TABLET ORAL at 20:05

## 2025-07-03 RX ADMIN — INSULIN LISPRO 4 UNITS: 100 INJECTION, SOLUTION INTRAVENOUS; SUBCUTANEOUS at 20:03

## 2025-07-03 RX ADMIN — ALBUMIN (HUMAN): 12.5 INJECTION, SOLUTION INTRAVENOUS at 08:54

## 2025-07-03 RX ADMIN — Medication 300 MCG: at 07:47

## 2025-07-03 RX ADMIN — ROCURONIUM BROMIDE 60 MG: 10 INJECTION INTRAVENOUS at 07:36

## 2025-07-03 RX ADMIN — BUSPIRONE HYDROCHLORIDE 10 MG: 10 TABLET ORAL at 17:46

## 2025-07-03 RX ADMIN — SUGAMMADEX 200 MG: 100 INJECTION, SOLUTION INTRAVENOUS at 09:15

## 2025-07-03 RX ADMIN — ONDANSETRON 4 MG: 2 INJECTION INTRAMUSCULAR; INTRAVENOUS at 09:55

## 2025-07-03 RX ADMIN — Medication 200 MCG: at 07:50

## 2025-07-03 RX ADMIN — PREGABALIN 75 MG: 75 CAPSULE ORAL at 06:13

## 2025-07-03 RX ADMIN — HALOPERIDOL LACTATE 0.5 MG: 5 INJECTION, SOLUTION INTRAMUSCULAR at 11:13

## 2025-07-03 RX ADMIN — HALOPERIDOL 0.5 MG: 5 INJECTION INTRAMUSCULAR at 11:13

## 2025-07-03 RX ADMIN — EPHEDRINE SULFATE 20 MG: 50 INJECTION INTRAVENOUS at 07:52

## 2025-07-03 RX ADMIN — SODIUM CHLORIDE 2000 MG: 900 INJECTION INTRAVENOUS at 07:40

## 2025-07-03 RX ADMIN — VASOPRESSIN 1 UNITS: 20 INJECTION INTRAVENOUS at 08:44

## 2025-07-03 RX ADMIN — ACETAMINOPHEN 1000 MG: 500 TABLET, FILM COATED ORAL at 06:14

## 2025-07-03 RX ADMIN — FENTANYL CITRATE 100 MCG: 50 INJECTION, SOLUTION INTRAMUSCULAR; INTRAVENOUS at 06:54

## 2025-07-03 RX ADMIN — ASPIRIN 81 MG: 81 TABLET, COATED ORAL at 07:23

## 2025-07-03 RX ADMIN — OXYCODONE 5 MG: 5 TABLET ORAL at 20:02

## 2025-07-03 RX ADMIN — SODIUM CHLORIDE 50 ML/HR: 450 INJECTION, SOLUTION INTRAVENOUS at 15:46

## 2025-07-03 RX ADMIN — DULOXETINE 60 MG: 60 CAPSULE, DELAYED RELEASE ORAL at 20:02

## 2025-07-03 RX ADMIN — METOCLOPRAMIDE 10 MG: 5 TABLET ORAL at 17:46

## 2025-07-03 RX ADMIN — CARVEDILOL 6.25 MG: 6.25 TABLET, FILM COATED ORAL at 17:46

## 2025-07-03 RX ADMIN — DEXAMETHASONE SODIUM PHOSPHATE 2 MG: 10 INJECTION INTRAMUSCULAR; INTRAVENOUS at 07:08

## 2025-07-03 RX ADMIN — Medication 500 MCG: at 07:52

## 2025-07-03 RX ADMIN — GLYCOPYRROLATE 0.2 MCG: 0.2 INJECTION, SOLUTION INTRAMUSCULAR; INTRAVENOUS at 08:08

## 2025-07-03 RX ADMIN — FAMOTIDINE 20 MG: 20 TABLET, FILM COATED ORAL at 06:14

## 2025-07-03 NOTE — H&P
Patient Name: Shayne Neal  MRN: [id number]  : 1955  DOS: 7/3/2025    Attending: Jose Raul Yin MD    Primary Care Provider: Provider, No Known      Chief complaint:  Right shoulder pain    Subjective   Patient is a pleasant 70 y.o. male presented for scheduled surgery by Dr. Yin.  He underwent right revision reverse total shoulder arthroplasty under general anesthesia.  He tolerated surgery well and was admitted for further further medical management.  He known to us from previous admission  on 11/3/2022 for right shoulder arthroplasty and a short time after that surgery he injured the right shoulder.  He has continued to have pain and limited range of motion since.  He reports right hand numbness, tingling and difficulty with .  He states he was going to have the problem repaired at the time of the injury, but had a new cancer diagnosis in the meantime.    When seen postop he is doing well.  His pain is well-controlled.  He denies nausea, shortness of breath or chest pain.  No history of DVT or PE.    [facility] and had preop cardiac clearance.    Allergies:  Allergies   Allergen Reactions    Erythromycin Rash     Redness         Meds:  Medications Prior to Admission   Medication Sig Dispense Refill Last Dose/Taking    benzoyl peroxide 5 % external wash Use as directed by Dr. Yin 148 mL 0 7/3/2025 Morning    busPIRone (BUSPAR) 10 MG tablet Take 1 tablet by mouth Daily.   Past Week    carvedilol (COREG) 6.25 MG tablet Take 1 tablet by mouth 2 (Two) Times a Day With Meals.   7/3/2025 at  3:30 AM    clonazePAM (KlonoPIN) 0.5 MG tablet Take 1 tablet by mouth 2 (Two) Times a Day.   7/3/2025 at  3:00 AM    DULoxetine (CYMBALTA) 60 MG capsule Take 1 capsule by mouth 2 (Two) Times a Day.   7/3/2025 at  3:00 AM    empagliflozin (JARDIANCE) 10 MG tablet tablet Take 1 tablet by mouth Daily.   2025 at  8:00 AM    finasteride (PROSCAR) 5 MG tablet Take 1 tablet  by mouth Daily.   7/3/2025 at  3:00 AM    Fluticasone-Umeclidin-Vilant (Trelegy Ellipta) 100-62.5-25 MCG/ACT inhaler Inhale 1 puff Daily.   7/2/2025 at  8:00 AM    gabapentin (NEURONTIN) 100 MG capsule Take 2 capsules by mouth Every Night.   7/2/2025 at  8:00 PM    hydrOXYzine (ATARAX) 25 MG tablet Take 1 tablet by mouth Daily As Needed for Anxiety.   Past Week Morning    mupirocin (BACTROBAN) 2 % ointment Administer 1 application into the nostril(s) as directed by provider 2 (Two) Times a Day beginning 5 days before surgery 22 g 0 7/3/2025 at  3:00 AM    omeprazole (priLOSEC) 20 MG capsule Take 1 capsule by mouth Daily.   7/3/2025 at  3:00 AM    prazosin (MINIPRESS) 5 MG capsule Take 2 capsules by mouth Every Night.   7/2/2025 at  8:00 PM    rosuvastatin (CRESTOR) 40 MG tablet Take 1 tablet by mouth Daily.   7/2/2025 at  8:00 PM    traZODone (DESYREL) 100 MG tablet Take 3 tablets by mouth Every Night.   7/2/2025 at  8:00 PM    bumetanide (BUMEX) 1 MG tablet Take 1-4 tablets by mouth Daily As Needed (SWELLING/WEIGHT GAIN).   7/1/2025 at  8:00 AM    clopidogrel (PLAVIX) 75 MG tablet Take 1 tablet by mouth Daily. Pt to hold 5 days prior to procedure per Dr. Hightower   6/28/2025 Morning    ezetimibe (ZETIA) 10 MG tablet Take 1 tablet by mouth Daily.   6/26/2025 Morning    metoclopramide (REGLAN) 10 MG tablet Take 1 tablet by mouth 3 (Three) Times a Day Before Meals.   More than a month    nitroglycerin (NITROSTAT) 0.4 MG SL tablet 1 under the tongue as needed for angina, may repeat q5mins for up three doses 25 tablet 2 More than a month    ondansetron (ZOFRAN) 4 MG tablet Take 1 tablet by mouth Every 8 (Eight) Hours As Needed for post-op nausea 30 tablet 0 More than a month    Semaglutide (OZEMPIC, 0.25 OR 0.5 MG/DOSE, SC) Inject  under the skin into the appropriate area as directed 1 (One) Time Per Week.   6/21/2025 Morning         History:   Past Medical History:   Diagnosis Date    Asthma     Bladder cancer 03/2024     Chronic kidney disease     Colon cancer     colon cancer    COPD (chronic obstructive pulmonary disease)     Coronary artery disease     Diabetes mellitus     diet controlled    Elevated cholesterol     Gout     Heart attack     History of cancer chemotherapy     for colon cancer    History of transfusion     after knee surgery and with chemo, no reaction    Hyperlipidemia     Hypertension     Neuropathy     Sleep apnea     wears cpap with 2 liters at night    Stroke     , left sided weakness and periodic muscle jerking     Past Surgical History:   Procedure Laterality Date    CARDIAC CATHETERIZATION      [facility] by Dr. Pastor / [facility]     CARDIAC CATHETERIZATION  2023    dr. pastor with 1 stent    CATARACT EXTRACTION Bilateral     CHOLECYSTECTOMY      COLON RESECTION      COLONOSCOPY      CORONARY ARTERY BYPASS GRAFT      [facility]     CORONARY STENT PLACEMENT      CYSTOSCOPY      INCISIONAL HERNIA REPAIR      abdomen    TOTAL KNEE ARTHROPLASTY Left     TOTAL SHOULDER ARTHROPLASTY Right 2022    Procedure: TOTAL SHOULDER ARTHROPLASTY - RIGHT;  Surgeon: Jose Raul Yin MD;  Location: [facility];  Service: Orthopedics;  Laterality: Right;    VENOUS ACCESS DEVICE (PORT) INSERTION      VENOUS ACCESS DEVICE (PORT) REMOVAL       Family History   Problem Relation Age of Onset    Hypertension Mother     Heart disease Mother     Diabetes Mother     Hyperlipidemia Mother     Hypertension Father     Heart disease Father     Hyperlipidemia Father      Social History     Tobacco Use    Smoking status: Former     Current packs/day: 0.00     Average packs/day: 2.0 packs/day for 40.0 years (80.0 ttl pk-yrs)     Types: Cigarettes     Start date:      Quit date:      Years since quittin.5    Smokeless tobacco: Never   Vaping Use    Vaping status: Never Used   Substance Use Topics    Alcohol use: Not Currently     Comment: 2006    Drug use: Not Currently      Types: Marijuana     Comment: last use 1994   He is  with 2 children and 2 stepchildren.  He is retired from a land[employer] company    Review of Systems  All systems were reviewed and negative except for:  Respiratory: positive for  shortness of air  Cardiovascular: positive for  chest pressure / pain, on exertion  Gastrointestinal: positive for  constipation and diarrhea    Vital Signs  /68 (BP Location: Left arm, Patient Position: Lying)   Pulse 87   Temp 97.6 °F (36.4 °C) (Oral)   Resp 16   SpO2 93%     Physical Exam:    General Appearance:    Alert, cooperative, in no acute distress   Head:    Normocephalic, without obvious abnormality, atraumatic   Eyes:            Lids and lashes normal, conjunctivae and sclerae normal, no   icterus, no pallor, corneas clear,    Ears:    Ears appear intact with no abnormalities noted   Throat:   No oral lesions, no thrush, oral mucosa moist   Neck:   No adenopathy, supple, trachea midline, no thyromegaly    Lungs:     Clear to auscultation,respirations regular, even and unlabored    Heart:    Regular rhythm and normal rate, normal S1 and S2   Abdomen:     Normal bowel sounds, no masses, no organomegaly, soft nontender, nondistended, no guarding, no rebound  tenderness   Genitalia:    Deferred   Extremities: RUE sling, nerve block present.  Aquacel CDI.  Good movement cap refill right digits   Pulses:   Pulses palpable and equal bilaterally   Skin:   No bleeding, bruising or rash   Neurologic:   Cranial nerves 2 - 12 grossly intact.         I reviewed the patient's new clinical results.     Results from last 7 days   Lab Units 07/03/25  0633   POTASSIUM mmol/L 4.3     Lab Results   Component Value Date    HGBA1C 7.23 (H) 06/20/2025      Latest Reference Range & Units 06/20/25 13:19   Sodium 136 - 145 mmol/L 140   Potassium 3.5 - 5.2 mmol/L 4.5   Chloride 98 - 107 mmol/L 104   CO2 22.0 - 29.0 mmol/L 25.0   Anion Gap 5.0 - 15.0 mmol/L 11.0   BUN 8.0 - 23.0 mg/dL  17.2   Creatinine 0.76 - 1.27 mg/dL 1.56 (H)   BUN/Creatinine Ratio 7.0 - 25.0  11.0   eGFR >60.0 mL/min/1.73 46.9 (L)   Glucose 65 - 99 mg/dL 99   Calcium 8.6 - 10.5 mg/dL 8.9   Alkaline Phosphatase 39 - 117 U/L 62   Total Protein 6.0 - 8.5 g/dL 6.4   Albumin 3.5 - 5.2 g/dL 3.9   Globulin gm/dL 2.5   A/G Ratio g/dL 1.6   AST (SGOT) 1 - 40 U/L 14   ALT (SGPT) 1 - 41 U/L 13   Total Bilirubin 0.0 - 1.2 mg/dL 0.4   (H): Data is abnormally high  (L): Data is abnormally low    Assessment and Plan:     S/P revision reverse total shoulder arthroplasty, right    Failed arthroplasty    Asthma    COPD (chronic obstructive pulmonary disease)    CAD (coronary artery disease)    HTN (hypertension)    NYDIA (obstructive sleep apnea)    Chronic systolic heart failure    HLD (hyperlipidemia)    CKD (chronic kidney disease)      Plan  1. PT/OT- VIANCA whitlock, CATINAB  2. Pain control-prns, interscalene block  3. IS-encourage  4. DVT proph- Mechs.  Resume Plavix when okay with Dr. Yin  5. Bowel regimen  6. Resume home medications as appropriate  7. Monitor post-op labs  8. DC planning for home    HTN, Hyperlipidemia, CAD, CHF  - Continue home Coreg and statin  - Monitor BP   - Holding parameters for BP meds  - Labetalol PRN for SBP>170    COPD, asthma, NYDIA  - Monitor O2 sat  - Supplemental oxygen as needed  - Continue home inhalers  - CPAP at night    DM  - hgb A1c on 6/20/25 7.2  - Hold OHA while inpt  - Accu-Chek AC and HS with low dose SSI    CKD  - Avoid nephrotoxic agents      AMPARO Peters  07/03/25  17:12 EDT

## 2025-07-03 NOTE — PLAN OF CARE
Goal Outcome Evaluation:  Plan of Care Reviewed With: patient, spouse        Progress: no change  Outcome Evaluation: PT initial eval completed. Pt presents below his functional baseline with NWB/immobilization of RUE, decreased endurance, and acute pain. Pt ambulated 200' with CGA and UE support. Further IPPT is warrented. PT rec d/c home with 24/7 assist when medically appropriate.    Anticipated Discharge Disposition (PT): home with 24/7 care

## 2025-07-03 NOTE — ANESTHESIA PROCEDURE NOTES
Interscalene cath      Patient reassessed immediately prior to procedure    Patient location during procedure: pre-op  Start time: 7/3/2025 6:54 AM  Reason for block: at surgeon's request and post-op pain management  Performed by  CRNA/CAA: Kenny Reese, CRNA  Assisted by: Antoinette Elena RN  Preanesthetic Checklist  Completed: patient identified, IV checked, site marked, risks and benefits discussed, surgical consent, monitors and equipment checked, pre-op evaluation and timeout performed  Prep:  Pt Position: left lateral decubitus  Sterile barriers:cap, gloves, mask and washed/disinfected hands  Prep: ChloraPrep  Patient monitoring: blood pressure monitoring, continuous pulse oximetry and EKG  Procedure    Sedation: yes  Performed under: local infiltration  Guidance:ultrasound guided    ULTRASOUND INTERPRETATION.  Using ultrasound guidance a 20 G gauge needle was placed in close proximity to the nerve, at which point, under ultrasound guidance anesthetic was injected in the area of the nerve and spread of the anesthesia was seen on ultrasound in close proximity thereto.  There were no abnormalities seen on ultrasound; a digital image was taken; and the patient tolerated the procedure with no complications. Images:still images obtained, printed/placed on chart    Laterality:right  Block Type:interscalene  Injection Technique:catheter  Needle Type:Tuohy and echogenic  Needle Gauge:18 G  Resistance on Injection: none  Catheter Size:20 G (20g)  Cath Depth at skin: 9 cm    Medications Used: fentaNYL citrate (PF) (SUBLIMAZE) injection - Intravenous   100 mcg - 7/3/2025 6:54:00 AM  bupivacaine PF (MARCAINE) 0.25 % injection - Injection   6 mL - 7/3/2025 6:54:00 AM      Post Assessment  Injection Assessment: negative aspiration for heme, no paresthesia on injection and incremental injection  Patient Tolerance:comfortable throughout block  Complications:no  Additional Notes  CATHETER  A high-frequency linear transducer,  "with sterile cover, was placed in the supraclavicular fossa to identify the subclavian artery and trunks and divisions of the brachial plexus. The transducer was then moved in a cephalad orientation with a slight rotation to continue visualization of the brachial plexus from the trunks and divisions, on to the C5-C7 roots. The insertion site was prepped and draped in sterile fashion. Skin and cutaneous tissue was infiltrated with 2-5 ml of 1% Lidocaine. Using ultrasound-guidance, an 18-gauge Contiplex Ultra 360 Touhy needle was advanced in plane from lateral to medial. Preservative-free normal saline was utilized for hydro-dissection of tissue, advancement of Touhy, and to confirm final needle placement at the fascial plane between the middle scalene muscle and sheath of the brachial plexus (C5-C7). A 20-gauge Contiplex Echo catheter was placed through the needle and advance out the tip of the Touhy 3-5 cm with the \"Barrso Flip\". The Touhy needle was then removed, and final catheter position verified lateral to the brachial plexus with local anesthetic (LA) and ultrasound visualization. The catheter was secured in the usual fashion with skin glue, benzoin, steri-strips, CHG tegaderm and label noting \"Nerve Block Catheter\". Jerk tape applied at yellow connector and catheter connection. All LA was injected in increments of 3-5 ml after catheter secured. Aspiration every 5 ml to prevent intravascular injection. Injection was completed with negative aspiration of blood and negative intravascular injection. Injection pressures were normal with minimal resistance.   Performed by: David Schmidt, CRNA          "

## 2025-07-03 NOTE — H&P
"Pre-Op H&P  Shayne Neal  [id number]  1955    Chief complaint: \" I am here to have my right shoulder replaced again.\"    HPI:    Patient is a 70 y.o.male who presents with right shoulder pain.  He presents today for scheduled surgery and anticipates a removal anatomic right shoulder arthroplasty with replacement of reverse total shoulder arthroplasty-RIGHT.  Patient reports he takes Plavix and states his last dose was on 6/28/2025.  He denies any symptomatic changes or recent illnesses since last office visit.  Patient reports he has an approximately 1 cm lesion on his right shoulder.  He states that it will occasionally bleed, scab over, and then subsequentially bleed again.  He denies any fever, chills, nausea, vomiting.    Review of Systems:  General ROS: Positive for right arm skin lesion. Negative for chills, fever.  No changes since last office visit.  Neg for recent sick exposure  Cardiovascular ROS: Positive for dyspnea on exertion at baseline.   Respiratory ROS: no cough, shortness of breath, or wheezing    Allergies: Denies allergy to latex or contrast dye.  Allergies   Allergen Reactions    Erythromycin Rash     Redness         Home Meds:    No current facility-administered medications on file prior to encounter.     Current Outpatient Medications on File Prior to Encounter   Medication Sig Dispense Refill    carvedilol (COREG) 6.25 MG tablet Take 1 tablet by mouth 2 (Two) Times a Day With Meals.      clonazePAM (KlonoPIN) 0.5 MG tablet Take 1 tablet by mouth 2 (Two) Times a Day.      DULoxetine (CYMBALTA) 60 MG capsule Take 1 capsule by mouth 2 (Two) Times a Day.      empagliflozin (JARDIANCE) 10 MG tablet tablet Take 1 tablet by mouth Daily.      finasteride (PROSCAR) 5 MG tablet Take 1 tablet by mouth Daily.      Fluticasone-Umeclidin-Vilant (Trelegy Ellipta) 100-62.5-25 MCG/ACT inhaler Inhale 1 puff Daily.      gabapentin (NEURONTIN) 100 MG capsule Take 2 capsules by mouth Every Night.      " omeprazole (priLOSEC) 20 MG capsule Take 1 capsule by mouth Daily.      prazosin (MINIPRESS) 5 MG capsule Take 2 capsules by mouth Every Night.      rosuvastatin (CRESTOR) 40 MG tablet Take 1 tablet by mouth Daily.      traZODone (DESYREL) 100 MG tablet Take 3 tablets by mouth Every Night.      clopidogrel (PLAVIX) 75 MG tablet Take 1 tablet by mouth Daily. Pt to hold 5 days prior to procedure per Dr. Hightower      nitroglycerin (NITROSTAT) 0.4 MG SL tablet 1 under the tongue as needed for angina, may repeat q5mins for up three doses 25 tablet 2       PMH:   Past Medical History:   Diagnosis Date    Asthma     Bladder cancer 03/2024    Chronic kidney disease     Colon cancer     colon cancer    COPD (chronic obstructive pulmonary disease)     Coronary artery disease     Diabetes mellitus     diet controlled    Elevated cholesterol     Gout     Heart attack     History of cancer chemotherapy     for colon cancer    History of transfusion     after knee surgery and with chemo, no reaction    Hyperlipidemia     Hypertension     Neuropathy     Sleep apnea     wears cpap with 2 liters at night    Stroke     , left sided weakness and periodic muscle jerking     PSH:    Past Surgical History:   Procedure Laterality Date    CARDIAC CATHETERIZATION      [facility] by Dr[facility] Pastor / [facility]     CARDIAC CATHETERIZATION  11/09/2023    dr. pastor with 1 stent    CATARACT EXTRACTION Bilateral     CHOLECYSTECTOMY      COLON RESECTION      COLONOSCOPY      CORONARY ARTERY BYPASS GRAFT      [facility] 1994    CORONARY STENT PLACEMENT      CYSTOSCOPY      INCISIONAL HERNIA REPAIR      abdomen    TOTAL KNEE ARTHROPLASTY Left     TOTAL SHOULDER ARTHROPLASTY Right 11/03/2022    Procedure: TOTAL SHOULDER ARTHROPLASTY - RIGHT;  Surgeon: Jose Raul Yin MD;  [facility];  Service: Orthopedics;  Laterality: Right;    VENOUS ACCESS DEVICE (PORT) INSERTION      VENOUS ACCESS DEVICE (PORT)  REMOVAL         Immunization History:  Influenza: Yes  Pneumococcal: Yes  Tetanus: Yes    Social History:   Tobacco:   Social History     Tobacco Use   Smoking Status Former    Current packs/day: 0.00    Average packs/day: 2.0 packs/day for 40.0 years (80.0 ttl pk-yrs)    Types: Cigarettes    Start date:     Quit date:     Years since quittin.5   Smokeless Tobacco Never      Alcohol:     Social History     Substance and Sexual Activity   Alcohol Use Not Currently    Comment:        Vitals:           /96 (BP Location: Left arm, Patient Position: Lying)   Pulse 76   Temp 98.2 °F (36.8 °C) (Temporal)   Resp 16   SpO2 95%     Physical Exam:  General Appearance:    Alert, cooperative, no distress, appears stated age   Head:    Normocephalic, without obvious abnormality, atraumatic   Lungs:     Clear to auscultation bilaterally, respirations unlabored    Heart:   Regular rate and rhythm, S1 and S2 normal, no murmur, rub    or gallop    Abdomen:    Soft, nontender.  +bowel sounds   Breast Exam:    deferred   Genitalia:    deferred   Extremities:   Extremities normal, atraumatic, no cyanosis or edema   Skin: Approximately 1 cm skin lesion on the right upper extremity.  No signs of infection.   Neurologic:   Grossly intact   Results Review  LABS:  Lab Results   Component Value Date    WBC 7.38 10/17/2023    HGB 14.2 10/17/2023    HCT 45.5 10/17/2023    .9 (H) 10/17/2023     10/17/2023    NEUTROABS 4.83 10/17/2023    GLUCOSE 99 2025    BUN 17.2 2025    CREATININE 1.56 (H) 2025     2025    K 4.3 2025     2025    CO2 25.0 2025    CALCIUM 8.9 2025    ALBUMIN 3.9 2025    AST 14 2025    ALT 13 2025    BILITOT 0.4 2025    PTT 30.5 2022    INR 1.00 2022       RADIOLOGY:  No radiology results for the last 3 days     I reviewed the patient's new clinical results.    Cancer Staging (if  applicable)  Cancer Patient: __ yes __no __unknown; If yes, clinical stage T:__ N:__M:__, stage group or __N/A    Impression: Patient presents with right shoulder pain.    Plan: Dr. Yin will perform removal of anatomic right shoulder arthroplasty with placement of reverse total shoulder arthroplasty-RIGHT.       Markus Ortega PA-C   07/03/25   6:46 AM EDT

## 2025-07-03 NOTE — THERAPY EVALUATION
Patient Name: Shayne Neal  : 1955    MRN: [id number]                              Today's Date: 7/3/2025       Admit Date: 7/3/2025    Visit Dx: No diagnosis found.  Patient Active Problem List   Diagnosis    Glenohumeral arthritis, right    Asthma    COPD (chronic obstructive pulmonary disease)    Gout    CAD (coronary artery disease)    HTN (hypertension)    NYDIA (obstructive sleep apnea)    Status post total replacement of right shoulder    Chronic systolic heart failure    Failed arthroplasty     Past Medical History:   Diagnosis Date    Asthma     Bladder cancer 2024    Chronic kidney disease     Colon cancer     colon cancer    COPD (chronic obstructive pulmonary disease)     Coronary artery disease     Diabetes mellitus     diet controlled    Elevated cholesterol     Gout     Heart attack     History of cancer chemotherapy     for colon cancer    History of transfusion     after knee surgery and with chemo, no reaction    Hyperlipidemia     Hypertension     Neuropathy     Sleep apnea     wears cpap with 2 liters at night    Stroke     , left sided weakness and periodic muscle jerking     Past Surgical History:   Procedure Laterality Date    CARDIAC CATHETERIZATION      [facility] by Dr. Pastor / [facility]     CARDIAC CATHETERIZATION  2023    dr. pastor with 1 stent    CATARACT EXTRACTION Bilateral     CHOLECYSTECTOMY      COLON RESECTION      COLONOSCOPY      CORONARY ARTERY BYPASS GRAFT      [facility]     CORONARY STENT PLACEMENT      CYSTOSCOPY      INCISIONAL HERNIA REPAIR      abdomen    TOTAL KNEE ARTHROPLASTY Left     TOTAL SHOULDER ARTHROPLASTY Right 2022    Procedure: TOTAL SHOULDER ARTHROPLASTY - RIGHT;  Surgeon: Jose Raul Yin MD;  Location: [facility];  Service: Orthopedics;  Laterality: Right;    VENOUS ACCESS DEVICE (PORT) INSERTION      VENOUS ACCESS DEVICE (PORT) REMOVAL        General Information       Row Name  07/03/25 1521          OT Time and Intention    Document Type evaluation  -AR     Mode of Treatment individual therapy;occupational therapy  -AR       Row Name 07/03/25 1521          General Information    Patient Profile Reviewed yes  -AR     Prior Level of Function independent:;all household mobility;community mobility;gait;transfer;ADL's  uses cane or walker rarely, mostly furniture walks, h/o recent falls  -AR     Existing Precautions/Restrictions fall;non-weight bearing;shoulder;other (see comments)  Breg sling with ABD bar, interscalene nerve catheter, residual L-sided weakness from CVA, healing L elbow woound from fall  -AR     Barriers to Rehab medically complex;previous functional deficit  -AR       Row Name 07/03/25 1521          Occupational Profile    Reason for Services/Referral (Occupational Profile) Pt caregiver training was provided face-to-face on strategies and techniques related to activities of daily living, transfers, mobility, home safety, durable medical equipment, and brace management for 40 minutes without the patient present.  -AR       Row Name 07/03/25 1521          Living Environment    Current Living Arrangements home  -AR     People in Home spouse  -AR       Row Name 07/03/25 1521          Home Main Entrance    Number of Stairs, Main Entrance one  -AR     Stair Railings, Main Entrance none  -AR       Row Name 07/03/25 1521          Stairs Within Home, Primary    Number of Stairs, Within Home, Primary none  -AR       Row Name 07/03/25 1521          Cognition    Orientation Status (Cognition) oriented x 4  -AR       Row Name 07/03/25 1521          Safety Issues/Impairments Affecting Functional Mobility    Safety Issues Affecting Function (Mobility) safety precaution awareness;safety precautions follow-through/compliance  -AR     Impairments Affecting Function (Mobility) endurance/activity tolerance;balance;strength;pain;range of motion (ROM);coordination;motor control;sensation/sensory  awareness  -AR               User Key  (r) = Recorded By, (t) = Taken By, (c) = Cosigned By      Initials Name Provider Type    Stephanie Lawson, OT Occupational Therapist                     Mobility/ADL's       Row Name 07/03/25 1558          Bed Mobility    Bed Mobility scooting/bridging;supine-sit  -AR     Scooting/Bridging Luna (Bed Mobility) contact guard  -AR     Sit-Supine Luna (Bed Mobility) minimum assist (75% patient effort);verbal cues  -AR     Assistive Device (Bed Mobility) head of bed elevated  -AR     Comment, (Bed Mobility) OT educated pt and spouse on importance of maintaining NWB and safe sleeping position.  -AR       Row Name 07/03/25 1558          Transfers    Transfers sit-stand transfer  -AR     Comment, (Transfers) Educated pt on importance of attending to interscalene nerve catheter to avoid dislodgement. Pt with strong posterior lean with initial standing, deferred ambulation and notified PT of need for eval.  -AR       Row Name 07/03/25 1558          Sit-Stand Transfer    Sit-Stand Luna (Transfers) minimum assist (75% patient effort);verbal cues;2 person assist  -AR     Assistive Device (Sit-Stand Transfers) other (see comments)  -AR       Row Name 07/03/25 1558          Functional Mobility    Functional Mobility- Ind. Level minimum assist (75% patient effort);2 person assist required;verbal cues required  -AR     Functional Mobility- Device other (see comments)  -AR       Row Name 07/03/25 1558          Activities of Daily Living    BADL Assessment/Intervention upper body dressing;bathing  -AR       Row Name 07/03/25 1558          Mobility    Extremity Weight-bearing Status right upper extremity  -AR     Right Upper Extremity (Weight-bearing Status) non weight-bearing (NWB)  -AR       Row Name 07/03/25 1558          Upper Body Dressing Assessment/Training    Luna Level (Upper Body Dressing) doff;don;dependent (less than 25% patient effort)  sling  -AR      Position (Upper Body Dressing) edge of bed sitting  -AR     Comment, (Upper Body Dressing) Educated pt and spouse on shoulder precautions, ADL retraining to maintain, sling management and care of interscalene nerve catheter during ADLs to avoid dislodgement.  -AR       Row Name 07/03/25 3988          Bathing Assessment/Intervention    Comment, (Bathing) Educated pt and spouse on shoulder precautions and axilla care to maintain, reviewed that nerve catheter cannot get wet.  -AR               User Key  (r) = Recorded By, (t) = Taken By, (c) = Cosigned By      Initials Name Provider Type    Stephanie Lawson, OT Occupational Therapist                   Obj/Interventions       Row Name 07/03/25 1601          Sensory Assessment (Somatosensory)    Sensory Assessment (Somatosensory) right UE  -AR     Sensory Subjective Reports numbness  -AR       Row Name 07/03/25 1601          Vision Assessment/Intervention    Visual Impairment/Limitations corrective lenses for reading  -AR       Row Name 07/03/25 1601          Range of Motion Comprehensive    Comment, General Range of Motion LUE WNL, R elbow/wrist/hand WFL  -AR       Row Name 07/03/25 1601          Strength Comprehensive (MMT)    Comment, General Manual Muscle Testing (MMT) Assessment LUE WFL, R elbow/wrist/hand WFL  -AR       Row Name 07/03/25 1601          Elbow/Forearm (Therapeutic Exercise)    Elbow/Forearm (Therapeutic Exercise) AAROM (active assistive range of motion)  -AR     Elbow/Forearm AAROM (Therapeutic Exercise) right;flexion;extension;supination;pronation;sitting;10 repetitions  -AR       Row Name 07/03/25 1601          Wrist (Therapeutic Exercise)    Wrist (Therapeutic Exercise) AAROM (active assistive range of motion)  -AR     Wrist AAROM (Therapeutic Exercise) right;flexion;extension;10 repetitions  -AR       Row Name 07/03/25 1601          Hand (Therapeutic Exercise)    Hand (Therapeutic Exercise) AROM (active range of motion)  -AR     Hand  AROM/AAROM (Therapeutic Exercise) right;AROM (active range of motion);finger flexion;finger extension;10 repetitions  -AR       Row Name 07/03/25 1601          Motor Skills    Therapeutic Exercise elbow/forearm;wrist;hand  -AR       Row Name 07/03/25 1601          Balance    Balance Assessment sitting static balance;sitting dynamic balance;standing static balance;standing dynamic balance  -AR     Static Sitting Balance supervision  -AR     Dynamic Sitting Balance supervision  -AR     Position, Sitting Balance unsupported;sitting edge of bed  -AR     Static Standing Balance minimal assist  -AR     Dynamic Standing Balance minimal assist;2-person assist  -AR     Position/Device Used, Standing Balance supported  -AR               User Key  (r) = Recorded By, (t) = Taken By, (c) = Cosigned By      Initials Name Provider Type    Stephanie Lawson, PARAG Occupational Therapist                   Goals/Plan       Row Name 07/03/25 1609          Transfer Goal 1 (OT)    Activity/Assistive Device (Transfer Goal 1, OT) sit-to-stand/stand-to-sit;toilet  -AR     [place]ed Level/Cues Needed (Transfer Goal 1, OT) supervision required;verbal cues required  -AR     Time Frame (Transfer Goal 1, OT) long term goal (LTG);5 days  -AR     Progress/Outcome (Transfer Goal 1, OT) goal ongoing  -AR       Row Name 07/03/25 1609          Dressing Goal 1 (OT)    Time Frame (Dressing Goal 1, OT) short term goal (STG);2 days  -AR     Strategies/Barriers (Dressing Goal 1, OT) Pt and/or family will don/doff sling with supervision  -AR     Progress/Outcome (Dressing Goal 1, OT) goal ongoing  -AR       Row Name 07/03/25 1609          ROM Goal 1 (OT)    Time Frame (ROM Goal 1, OT) short term goal (STG);2 days  -AR     Strategies/Barriers (ROM Goal 1, OT) Pt and/or family will complete UE HEP within physician parameters with supervison  -AR     Progress/Outcome (ROM Goal 1, OT) goal ongoing  -AR       Row Name 07/03/25 1609          Therapy  Assessment/Plan (OT)    Planned Therapy Interventions (OT) BADL retraining;edema control/reduction;IADL retraining;occupation/activity based interventions;functional balance retraining;orthotic fabrication/fitting/training;patient/caregiver education/training;ROM/therapeutic exercise;transfer/mobility retraining  -AR               User Key  (r) = Recorded By, (t) = Taken By, (c) = Cosigned By      Initials Name Provider Type    AR Stephanie Powell, OT Occupational Therapist                   Clinical Impression       Row Name 07/03/25 1606          Pain Assessment    Pretreatment Pain Rating 3/10  -AR     Posttreatment Pain Rating 4/10  -AR     Pain Location shoulder  -AR     Pain Side/Orientation right;anterior  -AR     Pain Management Interventions activity modification encouraged;breathing exercises utilized;cold applied;exercise or physical activity utilized;nursing notified;positioning techniques utilized  -AR     Response to Pain Interventions activity participation with decreased pain  -AR       Row Name 07/03/25 1606          Plan of Care Review    Plan of Care Reviewed With patient;spouse  -AR     Outcome Evaluation OT educated pt and family on shoulder precautions, ADL retraining to maintain, sling management and HEP. Pt unsteady with static standing, notified PT of need for eval. Anticipate DC home with initial 24/7 assist pending successful completion of mobility with PT.  -AR       Row Name 07/03/25 1606          Therapy Assessment/Plan (OT)    Rehab Potential (OT) good  -AR     Criteria for Skilled Therapeutic Interventions Met (OT) yes  -AR     Therapy Frequency (OT) daily  -AR       Row Name 07/03/25 1606          Therapy Plan Review/Discharge Plan (OT)    Anticipated Discharge Disposition (OT) home with 24/7 care  -AR       Row Name 07/03/25 1606          Vital Signs    Pre SpO2 (%) 94  -AR     O2 Delivery Pre Treatment supplemental O2  -AR     Intra SpO2 (%) 89  -AR     O2 Delivery Intra  Treatment room air  -AR     Post SpO2 (%) 93  -AR     O2 Delivery Post Treatment room air  -AR     Pre Patient Position Supine  -AR     Intra Patient Position Standing  -AR     Post Patient Position Supine  -AR       Row Name 07/03/25 1606          Positioning and Restraints    Pre-Treatment Position in bed  -AR     Post Treatment Position bed  -AR     In Bed notified nsg;sitting EOB;with family/caregiver;with brace  cold pack applied, in PACU  -AR               User Key  (r) = Recorded By, (t) = Taken By, (c) = Cosigned By      Initials Name Provider Type    Stephanie Lawson, OT Occupational Therapist                   Outcome Measures       Row Name 07/03/25 1610          How much help from another is currently needed...    Putting on and taking off regular lower body clothing? 2  -AR     Bathing (including washing, rinsing, and drying) 2  -AR     Toileting (which includes using toilet bed pan or urinal) 2  -AR     Putting on and taking off regular upper body clothing 2  -AR     Taking care of personal grooming (such as brushing teeth) 3  -AR     Eating meals 3  -AR     AM-PAC 6 Clicks Score (OT) 14  -AR       Row Name 07/03/25 1430 07/03/25 1405       How much help from another person do you currently need...    Turning from your back to your side while in flat bed without using bedrails? 3  -MD 3  -AB    Moving from lying on back to sitting on the side of a flat bed without bedrails? 3  -MD 3  -AB    Moving to and from a bed to a chair (including a wheelchair)? 3  -MD 3  -AB    Standing up from a chair using your arms (e.g., wheelchair, bedside chair)? 3  -MD 3  -AB    Climbing 3-5 steps with a railing? 2  -MD 2  -AB    To walk in hospital room? 3  -MD 3  -AB    AM-PAC 6 Clicks Score (PT) 17  -MD 17  -AB    Highest Level of Mobility Goal Stand (1 or More Minutes)-5  -MD Stand (1 or More Minutes)-5  -AB      Row Name 07/03/25 1610 07/03/25 1405       Functional Assessment    Outcome Measure Options AM-PAC 6  Clicks Daily Activity (OT)  -AR AM-PAC 6 Clicks Basic Mobility (PT)  -AB              User Key  (r) = Recorded By, (t) = Taken By, (c) = Cosigned By      Initials Name Provider Type    AR Stephanie Powell OT Occupational Therapist    Florence Nichols, PT Physical Therapist    Erna Yin, RN Registered Nurse                    Occupational Therapy Education       Title: PT OT SLP Therapies (In Progress)       Topic: Occupational Therapy (Done)       Point: ADL training (Done)       Learning Progress Summary            Patient Eager, E,TB,D,H, VU by AR at 7/3/2025 1610   Family Eager, E,TB,D,H, VU by AR at 7/3/2025 1610                      Point: Home exercise program (Done)       Learning Progress Summary            Patient Eager, E,TB,D,H, VU by AR at 7/3/2025 1610   Family Eager, E,TB,D,H, VU by AR at 7/3/2025 1610                      Point: Precautions (Done)       Learning Progress Summary            Patient Eager, E,TB,D,H, VU by AR at 7/3/2025 1610   Family Eager, E,TB,D,H, VU by AR at 7/3/2025 1610                      Point: Body mechanics (Done)       Learning Progress Summary            Patient Eager, E,TB,D,H, VU by AR at 7/3/2025 1610   Family Eager, E,TB,D,H, VU by AR at 7/3/2025 1610                                      User Key       Initials Effective Dates Name Provider Type Discipline    AR 07/11/23 -  Stephanie Powell OT Occupational Therapist OT                  OT Recommendation and Plan  Planned Therapy Interventions (OT): BADL retraining, edema control/reduction, IADL retraining, occupation/activity based interventions, functional balance retraining, orthotic fabrication/fitting/training, patient/caregiver education/training, ROM/therapeutic exercise, transfer/mobility retraining  Therapy Frequency (OT): daily  Plan of Care Review  Plan of Care Reviewed With: patient, spouse  Outcome Evaluation: OT educated pt and family on shoulder precautions, ADL retraining to maintain,  sling management and HEP. Pt unsteady with static standing, notified PT of need for eval. Anticipate DC home with initial 24/7 assist pending successful completion of mobility with PT.     Time Calculation:   Evaluation Complexity (OT)  Review Occupational Profile/Medical/Therapy History Complexity: brief/low complexity  Assessment, Occupational Performance/Identification of Deficit Complexity: 1-3 performance deficits  Clinical Decision Making Complexity (OT): problem focused assessment/low complexity  Overall Complexity of Evaluation (OT): low complexity     Time Calculation- OT       Row Name 07/03/25 1611             Time Calculation- OT    OT Start Time 1220  -AR      OT Received On 07/03/25  -AR      OT Goal Re-Cert Due Date 07/13/25  -AR         Timed Charges    15146 - OT Therapeutic Exercise Minutes 14  -AR      58845 - OT Self Care/Mgmt Minutes 16  -AR         Untimed Charges    OT Eval/Re-eval Minutes 51  -AR         Total Minutes    Timed Charges Total Minutes 30  -AR      Untimed Charges Total Minutes 51  -AR       Total Minutes 81  -AR                User Key  (r) = Recorded By, (t) = Taken By, (c) = Cosigned By      Initials Name Provider Type    AR Stephanie Powell, OT Occupational Therapist                  Therapy Charges for Today       Code Description Service Date Service Provider Modifiers Qty    49642071445 HC OT SELF CARE/MGMT/TRAIN EA 15 MIN 7/3/2025 Stephanie Powell, OT GO 1    93816226972 HC OT THER PROC EA 15 MIN 7/3/2025 Stephanie Powell OT GO 1    82686293067 HC OT EVAL LOW COMPLEXITY 4 7/3/2025 Stephanie Powell OT GO 1    34851117494 HC CAREGIVER TRAINING STRATEGIES & TQ 1ST 30 MINUTES 7/3/2025 Stephanie Powell OT  1    30164678755 HC CAREGIVER TRAINING STRATEGIES &TQ EA ADDL 15 MIN 7/3/2025 Stephanie Powell OT  1                 Stephanie Powell OT  7/3/2025

## 2025-07-03 NOTE — ANESTHESIA PROCEDURE NOTES
Peripheral Block    Pre-sedation assessment completed: 7/3/2025 6:39 AM    Patient reassessed immediately prior to procedure    Patient location during procedure: OR  Start time: 7/3/2025 7:08 AM  Reason for block: at surgeon's request and post-op pain management  Performed by  CRNA/CAA: Kenny Reese, CRNA  Assisted by: Antoinette Elena RN  Preanesthetic Checklist  Completed: patient identified, IV checked, site marked, risks and benefits discussed, surgical consent, monitors and equipment checked, pre-op evaluation and timeout performed  Prep:  Pt Position: supine  Sterile barriers:cap, gloves, mask and washed/disinfected hands  Prep: ChloraPrep  Patient monitoring: blood pressure monitoring, continuous pulse oximetry and EKG  Procedure  Performed under: local infiltration  Guidance:ultrasound guided and landmark technique  Images:still images obtained, printed/placed on chart    Laterality:right  Block Type:PECS I and PECS II  Injection Technique:single-shot  Needle Type:short-bevel  Needle Gauge:20 G  Resistance on Injection: none    Medications Used: dexamethasone sodium phosphate injection - Injection   2 mg - 7/3/2025 7:08:00 AM  bupivacaine PF (MARCAINE) 0.25 % injection - Injection   30 mL - 7/3/2025 7:08:00 AM      Medications  Preservative Free Saline:10ml  Comment:Block Injection:  Total volume of LA divided between Right and Left sided blocks         Post Assessment  Injection Assessment: negative aspiration for heme, incremental injection and no paresthesia on injection  Patient Tolerance:comfortable throughout block  Complications:no  Additional Notes  Interpectoral-Pectoserratus Plane   A high-frequency linear transducer, with sterile cover, was placed medial to the coracoid process in the paramedian sagittal plane. The transducer was moved caudally to the 4th rib and rotated slightly to allow an in-plane needle trajectory from medial to lateral. Pectoralis Major Muscle (PMM), Pectoralis Minor  "Muscle (PmM), Thoracoacromial Artery, Ribs, and Pleura were identified under ultrasound. The insertion site was prepped in sterile fashion and then localized with 2-5 ml of 1% Lidocaine. Using ultrasound-guidance, a 20-gauge B-Moyer 4\" Ultraplex 360 non-stimulating echogenic needle was advanced in plane until the tip of the needle was in the fascial plane between the PMM and PmM, lateral to the Thoracoacromial Artery. 1-3ml of preservative free normal saline was used to hydro-dissect the fascial planes. After the fascial plane was verified, 10ml local anesthetic (LA) was injected for Interpectoral fascial plane block. The needle was continued along the same path to the level of the 4th rib below PmM.  Initially preservative free normal saline was used to confirm needle position and then 20 ml of LA was injected for Pectoserratus fascial plane block. Aspiration every 5 ml to prevent intravascular injection. Injection was completed with negative aspiration of blood and negative intravascular injection. Injection pressures were normal with minimal resistance.     Performed by: Kenny Reese CRNA            "

## 2025-07-03 NOTE — ANESTHESIA PROCEDURE NOTES
"Pecs 1,2      Patient reassessed immediately prior to procedure    Patient location during procedure: pre-op  Reason for block: at surgeon's request and post-op pain management  Preanesthetic Checklist  Completed: patient identified, IV checked, site marked, risks and benefits discussed, surgical consent, monitors and equipment checked, pre-op evaluation and timeout performed  Prep:  Pt Position: supine  Sterile barriers:cap, gloves, mask and washed/disinfected hands  Prep: ChloraPrep  Patient monitoring: blood pressure monitoring, continuous pulse oximetry and EKG  Procedure  Performed under: local infiltration  Guidance:ultrasound guided and landmark technique  Images:still images obtained, printed/placed on chart    Laterality:right  Block Type:PECS I and PECS II  Injection Technique:single-shot  Needle Type:short-bevel  Needle Gauge:20 G  Resistance on Injection: none          Medications  Preservative Free Saline:10ml    Post Assessment  Injection Assessment: negative aspiration for heme, incremental injection and no paresthesia on injection  Patient Tolerance:comfortable throughout block  Complications:no  Additional Notes  Interpectoral-Pectoserratus Plane   A high-frequency linear transducer, with sterile cover, was placed medial to the coracoid process in the paramedian sagittal plane. The transducer was moved caudally to the 4th rib and rotated slightly to allow an in-plane needle trajectory from medial to lateral. Pectoralis Major Muscle (PMM), Pectoralis Minor Muscle (PmM), Thoracoacromial Artery, Ribs, and Pleura were identified under ultrasound. The insertion site was prepped in sterile fashion and then localized with 2-5 ml of 1% Lidocaine. Using ultrasound-guidance, a 20-gauge B-Moyer 4\" Ultraplex 360 non-stimulating echogenic needle was advanced in plane until the tip of the needle was in the fascial plane between the PMM and PmM, lateral to the Thoracoacromial Artery. 1-3ml of preservative free " normal saline was used to hydro-dissect the fascial planes. After the fascial plane was verified, 10ml local anesthetic (LA) was injected for Interpectoral fascial plane block. The needle was continued along the same path to the level of the 4th rib below PmM.  Initially preservative free normal saline was used to confirm needle position and then 20 ml of LA was injected for Pectoserratus fascial plane block. Aspiration every 5 ml to prevent intravascular injection. Injection was completed with negative aspiration of blood and negative intravascular injection. Injection pressures were normal with minimal resistance.     Performed by: David Schmidt CRNA

## 2025-07-03 NOTE — ANESTHESIA PROCEDURE NOTES
Airway  Reason: elective    Date/Time: 7/3/2025 7:39 AM  Airway not difficult    General Information and Staff    Patient location during procedure: OR  SRNA: Cassidy Lala SRNA  Indications and Patient Condition  Indications for airway management: airway protection    Preoxygenated: yes  MILS not maintained throughout    Mask difficulty assessment: 2 - vent by mask + OA or adjuvant +/- NMBA    Final Airway Details    Final airway type: endotracheal airway      Successful airway: ETT  Cuffed: yes   Successful intubation technique: video laryngoscopy  Adjuncts used in placement: intubating stylet  Endotracheal tube insertion site: oral  Blade: Quinn  Blade size: 4  ETT size (mm): 7.5  Cormack-Lehane Classification: grade I - full view of glottis  Placement verified by: chest auscultation and capnometry   Cuff volume (mL): 6  Measured from: gums  ETT/EBT to gums (cm): 23  Number of attempts at approach: 1  Assessment: lips, teeth, and gum same as pre-op and atraumatic intubation    Additional Comments  Negative epigastric sounds, Breath sound equal bilaterally with symmetric chest rise and fall

## 2025-07-03 NOTE — OP NOTE
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 right revision reverse total shoulder arthroplasty.(humeral and glenoid components)   2. right shoulder removal of total shoulder  arthroplasty (humeral and glenoid components)         SURGEON: Jose Raul Yin MD        Assistant:Markus Ortega  ** Please note the physician assistant was medically necessary to assist with positioning retraction, arm positioning, care of soft tissues and closure     ANESTHESIA: General plus block.       ESTIMATED BLOOD LOSS:150mL.       COMPLICATIONS: none   DISPOSITION: Recovery room in stable condition.      IMPLANTS:  Arthrex  Humeral Stem: size 9 long  Humeral Tray: offset 39 at 135 degrees  Polyethylene Liner:39 +6 C   Baseplate: 24+2, 10 deg augment,25mm post  Glenosphere: 39 +4             INDICATIONS: This is a 70-year-old male with right shoulder pain and limited function and motion secondary to above diagnosis. They have failed conservative treatment and after a discussion of risks, benefits, and alternatives, wished to proceed with shoulder arthroplasty.     DESCRIPTION OF PROCEDURE: On the day of surgery, the patient identified the right shoulder as the correct operative extremity. This was initialed by the surgeon with the patients's acknowledgment. The patient underwent placement of an interscalene block and was taken to the operating room and placed in the supine position. Upon induction of adequate anesthesia, the patient was brought up to the beach chair position and the shoulder and upper extremity were prepped and draped in the usual sterile fashion. Timeout confirmed the correct patient and operative extremity as well as that antibiotics were on board. A standard deltopectoral approach to the shoulder was  carried out. It was carried sharply through the skin and subcutaneous tissue. Medial and lateral flaps were developed over the deltopectoral fascia. The cephalic vein was identified and mobilized laterally with the deltoid. The subdeltoid and subpectoral spaces were mobilized and a blunt retractor was placed deep to this. The clavipectoral fascia was opened on the lateral edge of the conjoined tendon and the retractor was moved deep to this.    The inferior capsule was released directly off the humerus to allow greater than 90° of external rotation.      The humeral head component was extracted without difficulty.  A combination of lexii, saw and flexible osteotomes were used to free the  stem.  We then disimpacted with a footed tamp.     Stem was extracted without complication noted        We then turned our attention to the glenoid. The glenoid exposed We extracted the glenoid component without difficulty.A centering hole was drilled with the appropriate version, translational position, and inclination.  The glenoid was gently reamed and then the  central hole for the baseplate was drilled.            The glenoid baseplate  was inserted.  Screws were then placed through the baseplate  The glenosphere was then inserted and locked into place with a set screw.      The canal was then entered, reamed, and broached. Trialing was accomplished and final sizes selected.      The final stem impacted in in approximately 25° of retroversion.    The humerus was carefully subluxed back anteriorly.  Trial Polyethylene and tray was placed and trialing was carried out. The appropriate final sizes were chosen and locked into place.  The shoulder was then reduced.  This allowed nearly full passive range of motion with no instability.  The joint was copiously irrigated with orthopedic irrigation mixed with Betadine after the final implants were assembled and locked into place.       vancomycin powder was placed in the wound       The  deltopectoral interval was approximated with 0 Vicryl, the subcutaneous tissue with 2-0 Vicryl, and the skin with nylon. A sterile dressing was placed. Anesthesia was reversed and the patient was taken to the recovery room in stable condition. All instrument, needle, and sponge counts were correct.       Jose Raul Yin MD, MS

## 2025-07-03 NOTE — PLAN OF CARE
Goal Outcome Evaluation:  Plan of Care Reviewed With: patient, spouse           Outcome Evaluation: OT educated pt and family on shoulder precautions, ADL retraining to maintain, sling management and HEP. Pt unsteady with static standing, notified PT of need for eval. Anticipate DC home with initial 24/7 assist pending successful completion of mobility with PT.    Anticipated Discharge Disposition (OT): home with 24/7 care

## 2025-07-03 NOTE — ANESTHESIA POSTPROCEDURE EVALUATION
Patient: Shayne Neal    Procedure Summary       Date: 07/03/25 Room / Location:  [facility] [address] [place] [address] [facility]    Anesthesia Start: 0730 Anesthesia Stop: 0948    Procedure: REMOVAL OF ANATOMIC RIGHT TOTAL SHOULDER ARTHROPLASTY WITH PLACEMENT OF REVERSE TOTAL SHOULDER ARTHROPLASTY (Right: Shoulder) Diagnosis:       Failed arthroplasty      (failed shoulder arthroplasty)    Surgeons: Jose Raul Yin MD Provider: Florence Rosado DO    Anesthesia Type: general with block ASA Status: 4            Anesthesia Type: general with block    Vitals  Vitals Value Taken Time   BP     Temp     Pulse 73 07/03/25 09:48   Resp     SpO2 92 % 07/03/25 09:48   Vitals shown include unfiled device data.        Post Anesthesia Care and Evaluation    Patient location during evaluation: PACU  Patient participation: complete - patient participated  Level of consciousness: awake and alert  Pain management: adequate    Airway patency: patent  Anesthetic complications: No anesthetic complications  PONV Status: none  Cardiovascular status: hemodynamically stable and acceptable  Respiratory status: nonlabored ventilation, acceptable and nasal cannula  Hydration status: acceptable

## 2025-07-03 NOTE — THERAPY EVALUATION
Patient Name: Shayne Neal  : 1955    MRN: [id number]                              Today's Date: 7/3/2025       Admit Date: 7/3/2025    Visit Dx: No diagnosis found.  Patient Active Problem List   Diagnosis    Glenohumeral arthritis, right    Asthma    COPD (chronic obstructive pulmonary disease)    Gout    CAD (coronary artery disease)    HTN (hypertension)    NYDIA (obstructive sleep apnea)    Status post total replacement of right shoulder    Chronic systolic heart failure    Failed arthroplasty     Past Medical History:   Diagnosis Date    Asthma     Bladder cancer 2024    Chronic kidney disease     Colon cancer     colon cancer    COPD (chronic obstructive pulmonary disease)     Coronary artery disease     Diabetes mellitus     diet controlled    Elevated cholesterol     Gout     Heart attack     History of cancer chemotherapy     for colon cancer    History of transfusion     after knee surgery and with chemo, no reaction    Hyperlipidemia     Hypertension     Neuropathy     Sleep apnea     wears cpap with 2 liters at night    Stroke     , left sided weakness and periodic muscle jerking     Past Surgical History:   Procedure Laterality Date    CARDIAC CATHETERIZATION      [facility] by Dr. Pastor / [facility]     CARDIAC CATHETERIZATION  2023    dr. pastor with 1 stent    CATARACT EXTRACTION Bilateral     CHOLECYSTECTOMY      COLON RESECTION      COLONOSCOPY      CORONARY ARTERY BYPASS GRAFT      [facility]     CORONARY STENT PLACEMENT      CYSTOSCOPY      INCISIONAL HERNIA REPAIR      abdomen    TOTAL KNEE ARTHROPLASTY Left     TOTAL SHOULDER ARTHROPLASTY Right 2022    Procedure: TOTAL SHOULDER ARTHROPLASTY - RIGHT;  Surgeon: Jose Raul Yin MD;  Location: [facility];  Service: Orthopedics;  Laterality: Right;    VENOUS ACCESS DEVICE (PORT) INSERTION      VENOUS ACCESS DEVICE (PORT) REMOVAL        General Information       Row Name  07/03/25 [facility]5          Physical Therapy Time and Intention    Document Type evaluation  -AB     Mode of Treatment physical therapy  -AB       Row Name 07/03/25 [facility]5          General Information    Patient Profile Reviewed yes  -AB     Prior Level of Function independent:;all household mobility;community mobility;gait;transfer;bed mobility;min assist:;ADL's;using stairs  Hx of recent falls. Using RW at time.  -AB     Existing Precautions/Restrictions fall;other (see comments);right;shoulder;non-weight bearing  RUE NWB in sling, residual L sided weakness, IS nerve cath  -AB     Barriers to Rehab medically complex;previous functional deficit  -AB       Row Name 07/03/25 1355          Living Environment    Current Living Arrangements home  -AB     People in Home spouse  -AB       Row Name 07/03/25 [facility]5          Home Main Entrance    Number of Stairs, Main Entrance one  -AB     Stair Railings, Main Entrance none  -AB       Row Name 07/03/25 [facility]5          Stairs Within Home, Primary    Number of Stairs, Within Home, Primary none  -AB       Row Name 07/03/25 [facility]5          Cognition    Orientation Status (Cognition) oriented x 4  -AB       Row Name 07/03/25 [facility]5          Safety Issues/Impairments Affecting Functional Mobility    Safety Issues Affecting Function (Mobility) awareness of need for assistance;insight into deficits/self-awareness;safety precautions follow-through/compliance;sequencing abilities;safety precaution awareness  -AB     Impairments Affecting Function (Mobility) endurance/activity tolerance;balance;strength;pain;range of motion (ROM)  -AB               User Key  (r) = Recorded By, (t) = Taken By, (c) = Cosigned By      Initials Name Provider Type    AB Florence Newman PT Physical Therapist                   Mobility       Row Name 07/03/25 [facility]6          Bed Mobility    Bed Mobility scooting/bridging;sit-supine  -AB     Scooting/Bridging [place] (Bed Mobility) contact guard  -AB     Sit-Supine  [place] (Bed Mobility) moderate assist (50% patient effort);2 person assist;verbal cues  -AB     Comment, (Bed Mobility) received sitting UIC. Assist to swing legs into bed w/ transition to supine.  -AB       Row Name 07/03/25 1356          Transfers    Comment, (Transfers) Cues for hand placement and sequencing.  -AB       Row Name 07/03/25 1356          Sit-Stand Transfer    Sit-Stand [place] (Transfers) contact guard;2 person assist;verbal cues  -AB     Assistive Device (Sit-Stand Transfers) other (see comments)  HHA  -AB       Row Name 07/03/25 1356          Gait/Stairs (Locomotion)    [place] Level (Gait) contact guard;2 person assist;verbal cues  -AB     Assistive Device (Gait) other (see comments)  HHA  -AB     Patient was able to Ambulate yes  -AB     Distance in Feet (Gait) 200  -AB     Deviations/Abnormal Patterns (Gait) bilateral deviations;oliverio decreased;gait speed decreased;stride length decreased  -AB     [place] Level (Stairs) unable to assess  -AB     Comment, (Gait/Stairs) Pt ambulated with step through gait pattern at a slowed pace. Cues provided for PLB and increased heel strike. No overt LOB or knee buckling. At end of ambulation pt reported feeling of significant weakness in BLE but able to safely return to bed.  -AB       Row Name 07/03/25 1356          Mobility    Extremity Weight-bearing Status right upper extremity  -AB     Right Upper Extremity (Weight-bearing Status) non weight-bearing (NWB)  -AB               User Key  (r) = Recorded By, (t) = Taken By, (c) = Cosigned By      Initials Name Provider Type    Florence Ncihols, PT Physical Therapist                   Obj/Interventions       Row Name 07/03/25 1401          Range of Motion Comprehensive    General Range of Motion bilateral lower extremity ROM WFL  -AB       Row Name 07/03/25 1401          Strength Comprehensive (MMT)    General Manual Muscle Testing (MMT) Assessment lower extremity strength deficits  identified  -AB     Comment, General Manual Muscle Testing (MMT) Assessment RLE grossly 4-/5; LLE grossly 3/5  -AB       Row Name 07/03/25 1401          Balance    Balance Assessment sitting static balance;sitting dynamic balance;standing static balance;standing dynamic balance  -AB     Static Sitting Balance standby assist  -AB     Dynamic Sitting Balance standby assist  -AB     Position, Sitting Balance unsupported;sitting edge of bed  -AB     Static Standing Balance contact guard  -AB     Dynamic Standing Balance contact guard;2-person assist;verbal cues  -AB     Position/Device Used, Standing Balance supported;walker, front-wheeled  -AB     Balance Interventions sitting;standing;sit to stand;supported;static;dynamic;occupation based/functional task  -AB     Comment, Balance No overt LOB.  -AB       Row Name 07/03/25 1401          Sensory Assessment (Somatosensory)    Sensory Assessment (Somatosensory) LE sensation intact  -AB               User Key  (r) = Recorded By, (t) = Taken By, (c) = Cosigned By      Initials Name Provider Type    AB Florence Newman, PT Physical Therapist                   Goals/Plan       Row Name 07/03/25 1404          Bed Mobility Goal 1 (PT)    Activity/Assistive Device (Bed Mobility Goal 1, PT) sit to supine;supine to sit  -AB     [place] Level/Cues Needed (Bed Mobility Goal 1, PT) independent  -AB     Time Frame (Bed Mobility Goal 1, PT) short term goal (STG);5 days  -AB       Row Name 07/03/25 1404          Transfer Goal 1 (PT)    Activity/Assistive Device (Transfer Goal 1, PT) sit-to-stand/stand-to-sit;bed-to-chair/chair-to-bed;cane, straight  -AB     [place] Level/Cues Needed (Transfer Goal 1, PT) standby assist  -AB     Time Frame (Transfer Goal 1, PT) long term goal (LTG);10 days  -AB       Row Name 07/03/25 1404          Gait Training Goal 1 (PT)    Activity/Assistive Device (Gait Training Goal 1, PT) gait (walking locomotion);assistive device use;cane, straight  -AB      [place] Level (Gait Training Goal 1, PT) contact guard required  -AB     Distance (Gait Training Goal 1, PT) 400  -AB     Time Frame (Gait Training Goal 1, PT) long term goal (LTG);10 days  -AB       Row Name 07/03/25 1404          Stairs Goal 1 (PT)    Activity/Assistive Device (Stairs Goal 1, PT) ascending stairs;descending stairs  -AB     [place] Level/Cues Needed (Stairs Goal 1, PT) contact guard required  -AB     Number of Stairs (Stairs Goal 1, PT) 1  -AB     Time Frame (Stairs Goal 1, PT) long term goal (LTG);10 days  -AB       Row Name 07/03/25 1404          Therapy Assessment/Plan (PT)    Planned Therapy Interventions (PT) bed mobility training;balance training;gait training;home exercise program;postural re-education;patient/family education;transfer training;stretching;strengthening;stair training;ROM (range of motion)  -AB               User Key  (r) = Recorded By, (t) = Taken By, (c) = Cosigned By      Initials Name Provider Type    AB Florence Newman, PT Physical Therapist                   Clinical Impression       Row Name 07/03/25 1402          Pain    Pretreatment Pain Rating 0/10 - no pain  -AB     Posttreatment Pain Rating 0/10 - no pain  -AB     Pain Location extremity  -AB     Pain Side/Orientation right;upper  -AB     Pain Management Interventions activity modification encouraged;exercise or physical activity utilized;positioning techniques utilized;cold applied  -AB     Response to Pain Interventions activity participation with tolerable pain  -AB       Row Name 07/03/25 1402          Plan of Care Review    Plan of Care Reviewed With patient;spouse  -AB     Progress no change  -AB     Outcome Evaluation PT initial eval completed. Pt presents below his functional baseline with NWB/immobilization of RUE, decreased endurance, and acute pain. Pt ambulated 200' with CGA and UE support. Further IPPT is warrented. PT rec d/c home with 24/7 assist when medically appropriate.  -AB        Row Name 07/03/25 1402          Therapy Assessment/Plan (PT)    Patient/Family Therapy Goals Statement (PT) go home  -AB     Rehab Potential (PT) good  -AB     Criteria for Skilled Interventions Met (PT) yes;skilled treatment is necessary;meets criteria  -AB     Therapy Frequency (PT) daily  -AB     Predicted Duration of Therapy Intervention (PT) 10 days  -AB       Row Name 07/03/25 1402          Vital Signs    Pre Systolic BP Rehab 106  -AB     Pre Treatment Diastolic BP 75  -AB     Post Systolic BP Rehab 109  -AB     Post Treatment Diastolic BP 73  -AB     O2 Delivery Pre Treatment room air  -AB     O2 Delivery Intra Treatment room air  -AB     Post SpO2 (%) 90  -AB     O2 Delivery Post Treatment room air  -AB     Pre Patient Position Sitting  -AB     Intra Patient Position Standing  -AB     Post Patient Position Supine  -AB       Row Name 07/03/25 1402          Positioning and Restraints    Pre-Treatment Position in bed  -AB     Post Treatment Position bed  -AB     In Bed notified nsg;supine;with family/caregiver;patient within staff view  in PACU w/ RN at bedside.  -AB               User Key  (r) = Recorded By, (t) = Taken By, (c) = Cosigned By      Initials Name Provider Type    AB Florence Newman, PT Physical Therapist                   Outcome Measures       Row Name 07/03/25 1400          How much help from another person do you currently need...    Turning from your back to your side while in flat bed without using bedrails? 3  -AB     Moving from lying on back to sitting on the side of a flat bed without bedrails? 3  -AB     Moving to and from a bed to a chair (including a wheelchair)? 3  -AB     Standing up from a chair using your arms (e.g., wheelchair, bedside chair)? 3  -AB     Climbing 3-5 steps with a railing? 2  -AB     To walk in hospital room? 3  -AB     AM-PAC 6 Clicks Score (PT) 17  -AB     Highest Level of Mobility Goal Stand (1 or More Minutes)-5  -AB       Row Name 07/03/25 5173           Functional Assessment    Outcome Measure Options AM-PAC 6 Clicks Basic Mobility (PT)  -AB               User Key  (r) = Recorded By, (t) = Taken By, (c) = Cosigned By      Initials Name Provider Type    AB Florence Newman PT Physical Therapist                                 Physical Therapy Education       Title: PT OT SLP Therapies (In Progress)       Topic: Physical Therapy (In Progress)       Point: Mobility training (Done)       Learning Progress Summary            Patient Acceptance, E,D, VU,NR by AB at 7/3/2025 1407                      Point: Home exercise program (Not Started)       Learner Progress:  Not documented in this visit.              Point: Body mechanics (Done)       Learning Progress Summary            Patient Acceptance, E,D, VU,NR by AB at 7/3/2025 1407                      Point: Precautions (Done)       Learning Progress Summary            Patient Acceptance, E,D, VU,NR by AB at 7/3/2025 1407                                      User Key       Initials Effective Dates Name Provider Type Discipline    AB 09/22/22 -  Florence Newman PT Physical Therapist PT                  PT Recommendation and Plan  Planned Therapy Interventions (PT): bed mobility training, balance training, gait training, home exercise program, postural re-education, patient/family education, transfer training, stretching, strengthening, stair training, ROM (range of motion)  Progress: no change  Outcome Evaluation: PT initial eval completed. Pt presents below his functional baseline with NWB/immobilization of RUE, decreased endurance, and acute pain. Pt ambulated 200' with CGA and UE support. Further IPPT is warrented. PT rec d/c home with 24/7 assist when medically appropriate.     Time Calculation:   PT Evaluation Complexity  History, PT Evaluation Complexity: 1-2 personal factors and/or comorbidities  Examination of Body Systems (PT Eval Complexity): total of 3 or more elements  Clinical Presentation (PT Evaluation  Complexity): evolving  Clinical Decision Making (PT Evaluation Complexity): moderate complexity  Overall Complexity (PT Evaluation Complexity): moderate complexity     PT Charges       Row Name 07/03/25 1408             Time Calculation    Start Time 1337  -AB      PT Received On 07/03/25  -AB      PT Goal Re-Cert Due Date 07/13/25  -AB         Untimed Charges    PT Eval/Re-eval Minutes 50  -AB         Total Minutes    Untimed Charges Total Minutes 50  -AB       Total Minutes 50  -AB                User Key  (r) = Recorded By, (t) = Taken By, (c) = Cosigned By      Initials Name Provider Type    AB Florence Newman, PT Physical Therapist                  Therapy Charges for Today       Code Description Service Date Service Provider Modifiers Qty    21452415176 HC PT EVAL MOD COMPLEXITY 4 7/3/2025 Florence Newman, PT GP 1    88471609587 HC PT THER SUPP EA 15 MIN 7/3/2025 Florence Newman, PT GP 3            PT G-Codes  Outcome Measure Options: AM-PAC 6 Clicks Basic Mobility (PT)  AM-PAC 6 Clicks Score (PT): 17  PT Discharge Summary  Anticipated Discharge Disposition (PT): home with 24/7 care    Florence Newman, PT  7/3/2025

## 2025-07-04 VITALS
RESPIRATION RATE: 18 BRPM | HEART RATE: 78 BPM | OXYGEN SATURATION: 98 % | SYSTOLIC BLOOD PRESSURE: 121 MMHG | TEMPERATURE: 98.2 F | DIASTOLIC BLOOD PRESSURE: 77 MMHG

## 2025-07-04 LAB
ANION GAP SERPL CALCULATED.3IONS-SCNC: 12 MMOL/L (ref 5–15)
BASOPHILS # BLD AUTO: 0.02 10*3/MM3 (ref 0–0.2)
BASOPHILS NFR BLD AUTO: 0.2 % (ref 0–1.5)
BUN SERPL-MCNC: 18 MG/DL (ref 8–23)
BUN/CREAT SERPL: 13.1 (ref 7–25)
CALCIUM SPEC-SCNC: 8.3 MG/DL (ref 8.6–10.5)
CHLORIDE SERPL-SCNC: 107 MMOL/L (ref 98–107)
CO2 SERPL-SCNC: 22 MMOL/L (ref 22–29)
CREAT SERPL-MCNC: 1.37 MG/DL (ref 0.76–1.27)
DEPRECATED RDW RBC AUTO: 46.7 FL (ref 37–54)
EGFRCR SERPLBLD CKD-EPI 2021: 54.8 ML/MIN/1.73
EOSINOPHIL # BLD AUTO: 0 10*3/MM3 (ref 0–0.4)
EOSINOPHIL NFR BLD AUTO: 0 % (ref 0.3–6.2)
ERYTHROCYTE [DISTWIDTH] IN BLOOD BY AUTOMATED COUNT: 13.6 % (ref 12.3–15.4)
GLUCOSE BLDC GLUCOMTR-MCNC: 190 MG/DL (ref 70–130)
GLUCOSE BLDC GLUCOMTR-MCNC: 192 MG/DL (ref 70–130)
GLUCOSE SERPL-MCNC: 186 MG/DL (ref 65–99)
HCT VFR BLD AUTO: 34.5 % (ref 37.5–51)
HGB BLD-MCNC: 11.2 G/DL (ref 13–17.7)
IMM GRANULOCYTES # BLD AUTO: 0.05 10*3/MM3 (ref 0–0.05)
IMM GRANULOCYTES NFR BLD AUTO: 0.5 % (ref 0–0.5)
LYMPHOCYTES # BLD AUTO: 1.13 10*3/MM3 (ref 0.7–3.1)
LYMPHOCYTES NFR BLD AUTO: 11.5 % (ref 19.6–45.3)
MCH RBC QN AUTO: 30.4 PG (ref 26.6–33)
MCHC RBC AUTO-ENTMCNC: 32.5 G/DL (ref 31.5–35.7)
MCV RBC AUTO: 93.5 FL (ref 79–97)
MONOCYTES # BLD AUTO: 1.11 10*3/MM3 (ref 0.1–0.9)
MONOCYTES NFR BLD AUTO: 11.3 % (ref 5–12)
NEUTROPHILS NFR BLD AUTO: 7.51 10*3/MM3 (ref 1.7–7)
NEUTROPHILS NFR BLD AUTO: 76.5 % (ref 42.7–76)
NRBC BLD AUTO-RTO: 0 /100 WBC (ref 0–0.2)
PLATELET # BLD AUTO: 119 10*3/MM3 (ref 140–450)
PMV BLD AUTO: 10.1 FL (ref 6–12)
POTASSIUM SERPL-SCNC: 4.4 MMOL/L (ref 3.5–5.2)
RBC # BLD AUTO: 3.69 10*6/MM3 (ref 4.14–5.8)
SODIUM SERPL-SCNC: 141 MMOL/L (ref 136–145)
WBC NRBC COR # BLD AUTO: 9.82 10*3/MM3 (ref 3.4–10.8)

## 2025-07-04 PROCEDURE — 63710000001 REVEFENACIN 175 MCG/3ML SOLUTION: Performed by: NURSE PRACTITIONER

## 2025-07-04 PROCEDURE — 97110 THERAPEUTIC EXERCISES: CPT | Performed by: OCCUPATIONAL THERAPIST

## 2025-07-04 PROCEDURE — 97530 THERAPEUTIC ACTIVITIES: CPT

## 2025-07-04 PROCEDURE — 82948 REAGENT STRIP/BLOOD GLUCOSE: CPT

## 2025-07-04 PROCEDURE — 94799 UNLISTED PULMONARY SVC/PX: CPT

## 2025-07-04 PROCEDURE — 94761 N-INVAS EAR/PLS OXIMETRY MLT: CPT

## 2025-07-04 PROCEDURE — 97535 SELF CARE MNGMENT TRAINING: CPT | Performed by: OCCUPATIONAL THERAPIST

## 2025-07-04 PROCEDURE — 80048 BASIC METABOLIC PNL TOTAL CA: CPT | Performed by: ORTHOPAEDIC SURGERY

## 2025-07-04 PROCEDURE — 85025 COMPLETE CBC W/AUTO DIFF WBC: CPT | Performed by: ORTHOPAEDIC SURGERY

## 2025-07-04 PROCEDURE — 63710000001 INSULIN LISPRO (HUMAN) PER 5 UNITS: Performed by: NURSE PRACTITIONER

## 2025-07-04 PROCEDURE — 25010000002 CEFAZOLIN PER 500 MG: Performed by: ORTHOPAEDIC SURGERY

## 2025-07-04 RX ADMIN — DULOXETINE 60 MG: 60 CAPSULE, DELAYED RELEASE ORAL at 09:50

## 2025-07-04 RX ADMIN — BUSPIRONE HYDROCHLORIDE 10 MG: 10 TABLET ORAL at 09:51

## 2025-07-04 RX ADMIN — PANTOPRAZOLE SODIUM 40 MG: 40 TABLET, DELAYED RELEASE ORAL at 04:56

## 2025-07-04 RX ADMIN — REVEFENACIN 175 MCG: 175 SOLUTION RESPIRATORY (INHALATION) at 06:43

## 2025-07-04 RX ADMIN — SODIUM CHLORIDE 2000 MG: 900 INJECTION INTRAVENOUS at 00:20

## 2025-07-04 RX ADMIN — METOCLOPRAMIDE 10 MG: 5 TABLET ORAL at 09:50

## 2025-07-04 RX ADMIN — OXYCODONE 5 MG: 5 TABLET ORAL at 04:57

## 2025-07-04 RX ADMIN — FINASTERIDE 5 MG: 5 TABLET, FILM COATED ORAL at 09:50

## 2025-07-04 RX ADMIN — OXYCODONE 5 MG: 5 TABLET ORAL at 09:57

## 2025-07-04 RX ADMIN — BUDESONIDE AND FORMOTEROL FUMARATE DIHYDRATE 2 PUFF: 160; 4.5 AEROSOL RESPIRATORY (INHALATION) at 06:43

## 2025-07-04 RX ADMIN — CARVEDILOL 6.25 MG: 6.25 TABLET, FILM COATED ORAL at 09:50

## 2025-07-04 RX ADMIN — INSULIN LISPRO 2 UNITS: 100 INJECTION, SOLUTION INTRAVENOUS; SUBCUTANEOUS at 12:47

## 2025-07-04 RX ADMIN — OXYCODONE 5 MG: 5 TABLET ORAL at 13:41

## 2025-07-04 RX ADMIN — INSULIN LISPRO 2 UNITS: 100 INJECTION, SOLUTION INTRAVENOUS; SUBCUTANEOUS at 10:00

## 2025-07-04 RX ADMIN — OXYCODONE 5 MG: 5 TABLET ORAL at 00:50

## 2025-07-04 RX ADMIN — METOCLOPRAMIDE 10 MG: 5 TABLET ORAL at 12:45

## 2025-07-04 NOTE — PROGRESS NOTES
"  Orthopedic Progress Note      Patient: Shayne Neal  YOB: 1955     Date of Admission: 7/3/2025  5:11 AM Medical Record Number: [id number]     Attending Physician: Jose Raul Yin MD    Status Post:  Procedure(s):  REMOVAL OF ANATOMIC RIGHT TOTAL SHOULDER ARTHROPLASTY WITH PLACEMENT OF REVERSE TOTAL SHOULDER ARTHROPLASTY Post Operative Day Number: 1    Subjective : No new orthopaedic complaints     Pain Relief: some relief with present medication.     Systemic Complaints: No Complaints  Vitals:    07/03/25 2325 07/04/25 0645 07/04/25 0751 07/04/25 0950   BP: 117/75  133/76 121/75   BP Location: Left arm  Left arm    Patient Position: Lying  Lying    Pulse:  92 76 76   Resp: 17  18    Temp: 97.7 °F (36.5 °C)  98.4 °F (36.9 °C)    TempSrc: Oral  Oral    SpO2:  (!) 89% 96%        Physical Exam: 70 y.o. male    General Appearance:       Alert, cooperative, in no acute distress                  Extremities:    Dressing Clean, Dry and Intact             No clinical sign of DVT        Able to do good movements of digits    Pulses:   Pulses palpable and equal bilaterally           Diagnostic Tests:     Results from last 7 days   Lab Units 07/04/25  0703   WBC 10*3/mm3 9.82   HEMOGLOBIN g/dL 11.2*   HEMATOCRIT % 34.5*   PLATELETS 10*3/mm3 119*     Results from last 7 days   Lab Units 07/04/25  0703 07/03/25  0633   SODIUM mmol/L 141  --    POTASSIUM mmol/L 4.4 4.3   CHLORIDE mmol/L 107  --    CO2 mmol/L 22.0  --    BUN mg/dL 18.0  --    CREATININE mg/dL 1.37*  --    GLUCOSE mg/dL 186*  --    CALCIUM mg/dL 8.3*  --      Results from last 7 days   Lab Units 07/03/25  0633   INR  0.94   APTT seconds 25.6     No results found for: \"URICACID\"  No results found for: \"CRYSTAL\"  Microbiology Results (last 10 days)       ** No results found for the last 240 hours. **          XR Shoulder 1 View Right  Result Date: 7/3/2025  Impression: Expected postoperative appearance of conversion of total shoulder " arthroplasty to reverse total shoulder arthroplasty. Electronically Signed: Jose Raul Landis MD  7/3/2025 10:11 AM EDT  Workstation ID: [id number]        Current Medications:  Scheduled Meds:budesonide-formoterol, 2 puff, Inhalation, BID - RT   And  revefenacin, 175 mcg, Nebulization, Daily - RT  busPIRone, 10 mg, Oral, Daily  carvedilol, 6.25 mg, Oral, BID With Meals  DULoxetine, 60 mg, Oral, BID  finasteride, 5 mg, Oral, Daily  gabapentin, 100 mg, Oral, Nightly  insulin lispro, 2-7 Units, Subcutaneous, 4x Daily AC & at Bedtime  metoclopramide, 10 mg, Oral, TID AC  pantoprazole, 40 mg, Oral, Q AM  prazosin, 10 mg, Oral, Nightly  rosuvastatin, 40 mg, Oral, Nightly  traZODone, 300 mg, Oral, Nightly      Continuous Infusions:ropivacaine,   sodium chloride, 50 mL/hr, Last Rate: 50 mL/hr (07/03/25 1546)      PRN Meds:.  acetaminophen **OR** acetaminophen    clonazePAM    dextrose    dextrose    glucagon (human recombinant)    HYDROmorphone **AND** naloxone    labetalol    ondansetron ODT **OR** ondansetron    oxyCODONE    sodium chloride    Assessment: Status post  HI TIKI SHOULDER ARTHRPLSTY HUMERAL&GLENOID COMPNT [20161] (REMOVAL OF ANATOMIC RIGHT TOTAL SHOULDER ARTHROPLASTY WITH PLACEMENT OF REVERSE TOTAL SHOULDER ARTHROPLASTY)    Patient Active Problem List   Diagnosis    Glenohumeral arthritis, right    Asthma    COPD (chronic obstructive pulmonary disease)    Gout    CAD (coronary artery disease)    HTN (hypertension)    NYDIA (obstructive sleep apnea)    Status post total replacement of right shoulder    Chronic systolic heart failure    Failed arthroplasty    S/P revision reverse total shoulder arthroplasty, right    HLD (hyperlipidemia)    CKD (chronic kidney disease)       PLAN:   Continues current post-op course  Mobilize with PT as tolerated per protocol  Sling except for elbow wrist and hand range of motion  Dressing in place until follow-up    Weight Bearing: NWB  Discharge Plan: OK to plan for discharge in   today to home  from orthopaedic perspective.    Juan Jose Mejia MD    Date: 7/4/2025    Time: 11:12 EDT

## 2025-07-04 NOTE — THERAPY TREATMENT NOTE
Patient Name: Shayne Neal  : 1955    MRN: [id number]                              Today's Date: 2025       Admit Date: 7/3/2025    Visit Dx:     ICD-10-CM ICD-9-CM   1. Failure of arthroplasty, initial encounter  T84.019A 996.47     V43.60     Patient Active Problem List   Diagnosis    Glenohumeral arthritis, right    Asthma    COPD (chronic obstructive pulmonary disease)    Gout    CAD (coronary artery disease)    HTN (hypertension)    NYDIA (obstructive sleep apnea)    Status post total replacement of right shoulder    Chronic systolic heart failure    Failed arthroplasty    S/P revision reverse total shoulder arthroplasty, right    HLD (hyperlipidemia)    CKD (chronic kidney disease)     Past Medical History:   Diagnosis Date    Asthma     Bladder cancer 2024    Chronic kidney disease     Colon cancer     colon cancer    COPD (chronic obstructive pulmonary disease)     Coronary artery disease     Diabetes mellitus     diet controlled    Elevated cholesterol     Gout     Heart attack     History of cancer chemotherapy     for colon cancer    History of transfusion     after knee surgery and with chemo, no reaction    Hyperlipidemia     Hypertension     Neuropathy     Sleep apnea     wears cpap with 2 liters at night    Stroke     , left sided weakness and periodic muscle jerking     Past Surgical History:   Procedure Laterality Date    CARDIAC CATHETERIZATION      [facility] by Dr. Pastor / [facility]     CARDIAC CATHETERIZATION  2023    dr. pastor with 1 stent    CATARACT EXTRACTION Bilateral     CHOLECYSTECTOMY      COLON RESECTION      COLONOSCOPY      CORONARY ARTERY BYPASS GRAFT      [facility]     CORONARY STENT PLACEMENT      CYSTOSCOPY      INCISIONAL HERNIA REPAIR      abdomen    TOTAL KNEE ARTHROPLASTY Left     TOTAL SHOULDER ARTHROPLASTY Right 2022    Procedure: TOTAL SHOULDER ARTHROPLASTY - RIGHT;  Surgeon: Jose Raul Yin  Patient is a 62year old woman with hx of dystonia, idiopathic peripheral neuropathy, ulnar neuropathy, copper deficiency, anxiety presenting for evaluation of possible Fede Disease  Patient multiple complaints but neurologically has generalized tingling sensation since at least 2016, mostly in lower extremities that has worsened since 2021  Patient has poor sleep, sleeping 4-5 hours and has had sleep study done in 2004 which states according to patient that she sleeps in 2 phases  According to patient, Ophthalmology saw patient and saw no Kayer-Fleischer Ring  Family history of sleep problems including narcolepsy  Exam: No focal numbness, weakness, visual deficits, ambulatory dysfunction    Pertinent labs as of 2023  - Copper level 12/2/2022 109 normal  - Ceruloplasmin level 8/24/2022 31 normal  - Urinary copper excretion 8/24/2022 <5  - B12 7/1/2022: 361  - A1c 7/1/2022: 5 3%    EMG right upper extremity: upper extremity no median nor ulnar neuropathy    Current medications: None    Impression: Ceruloplasmin and copper level normal range with <40 urinary excretion with no Glasgow Cap Ring according to Ophthalmology thus unlikely Fede's Disease  Overall tingling sensation throughout with general joint pain will consider rheumatologic conditions especially in setting of abnormal MARYELLEN in the past     Plan:  Patient is unlikely to have had Leslie Kenneth disease  Recommend patient seen Rheumatology- sees patient in 10/10/2023  Ordered Autoimmune labs concerning patient's last MARYELLEN being positive 2021 with 1:320 gradient including (MARYELLEN, ANCA, Sjogren's)    Also ordered B6, MMA (with Vitamin B12), SPEP to complete workup for possible neuropathy  Ordered iron panel due to greater nighttime tingling sensation causing RLS   -If iron deficient consider iron supplementation  Will hold on EMG 2 lower limb at this time as patient had tear of left knee and will consider again in next visit  Patient to followup with sleep MD David;  Location: [facility];  Service: Orthopedics;  Laterality: Right;    VENOUS ACCESS DEVICE (PORT) INSERTION      VENOUS ACCESS DEVICE (PORT) REMOVAL        General Information       Row Name 07/04/25 1233          OT Time and Intention    Document Type therapy note (daily note)  -AR     Mode of Treatment individual therapy;occupational therapy  -AR       Row Name 07/04/25 1233          General Information    Existing Precautions/Restrictions fall;non-weight bearing;shoulder;other (see comments)  Donjoy III w/pillow, interscalene, L weakness from CVA, L elbow healing wound  -AR       Row Name 07/04/25 1233          Cognition    Orientation Status (Cognition) oriented x 4  -AR       Row Name 07/04/25 1233          Safety Issues/Impairments Affecting Functional Mobility    Impairments Affecting Function (Mobility) strength;pain;range of motion (ROM);coordination;motor control;sensation/sensory awareness  -AR               User Key  (r) = Recorded By, (t) = Taken By, (c) = Cosigned By      Initials Name Provider Type    AR Stephanie Powell, OT Occupational Therapist                     Mobility/ADL's       Row Name 07/04/25 1234          Transfers    Transfers sit-stand transfer;stand-sit transfer  -AR     Comment, (Transfers) Educated pt on importance of attending to interscalene nerve catheter to avoid dislodgement.  -AR       Row Name 07/04/25 1234          Sit-Stand Transfer    Sit-Stand Banks (Transfers) supervision  -AR       Row Name 07/04/25 1234          Stand-Sit Transfer    Stand-Sit Banks (Transfers) supervision  -AR       Row Name 07/04/25 1234          Activities of Daily Living    BADL Assessment/Intervention upper body dressing;bathing;lower body dressing;feeding  -AR       Row Name 07/04/25 1234          Mobility    Extremity Weight-bearing Status right upper extremity  -AR     Right Upper Extremity (Weight-bearing Status) non weight-bearing (NWB)  -AR       Row Name 07/04/25  medicine concerning insomnia  Patient to followup in 4 months 1234          Upper Body Dressing Assessment/Training    [place] Level (Upper Body Dressing) doff;front opening garment;moderate assist (50% patient effort);don;pull-over garment;maximum assist (25% patient effort)  -AR     Position (Upper Body Dressing) edge of bed sitting  -AR     Comment, (Upper Body Dressing) Reviewed shoulder precautions, ADL retraining to maintain, sling management and care of interscalene nerve catheter during ADLs to avoid dislodgement. OT adjusted Breg sling once yesterday and twice today d/t stabilizer bar flipping outwards with resultant elbow pain. Despite adjustements to sling, bar continues to rotate outward. Dr. Mejia roundgonzalo and gave permission for OT to place him in Donjoy III with pillow. Pt reports improved comfort. Post teaching, pt and spouse able to don/doff sling with supervision.  -AR       Row Name 07/04/25 1234          Bathing Assessment/Intervention    Comment, (Bathing) Educated pt on shoulder precautions and axilla care to maintain, reviewed that nerve catheter cannot get wet. Issued LH sponge to assist.  -AR       Row Name 07/04/25 1234          Lower Body Dressing Assessment/Training    [place] Level (Lower Body Dressing) don;pants/bottoms;moderate assist (50% patient effort)  -AR     Position (Lower Body Dressing) unsupported sitting;unsupported standing  -AR     Comment, (Lower Body Dressing) Issued reacher and shoe horn to assist.  -AR               User Key  (r) = Recorded By, (t) = Taken By, (c) = Cosigned By      Initials Name Provider Type    Stephanie Lawson, OT Occupational Therapist                   Obj/Interventions       Row Name 07/04/25 1237          Sensory Assessment (Somatosensory)    Sensory Assessment (Somatosensory) right UE  -AR     Sensory Subjective Reports numbness  -AR       Row Name 07/04/25 1237          Elbow/Forearm (Therapeutic Exercise)    Elbow/Forearm AAROM (Therapeutic Exercise)  right;flexion;extension;supination;pronation;sitting;10 repetitions  -AR       Row Name 07/04/25 1237          Wrist (Therapeutic Exercise)    Wrist (Therapeutic Exercise) AROM (active range of motion)  -AR     Wrist AROM (Therapeutic Exercise) right;flexion;extension;10 repetitions  -AR       Row Name 07/04/25 1237          Hand (Therapeutic Exercise)    Hand (Therapeutic Exercise) AROM (active range of motion)  -AR     Hand AROM/AAROM (Therapeutic Exercise) right;AROM (active range of motion);finger flexion;finger extension;10 repetitions  -AR       Row Name 07/04/25 1237          Motor Skills    Therapeutic Exercise elbow/forearm;wrist;hand  -AR       Row Name 07/04/25 1237          Balance    Balance Assessment sitting static balance;sitting dynamic balance;standing static balance;standing dynamic balance  -AR     Static Sitting Balance supervision  -AR     Dynamic Sitting Balance supervision  -AR     Position, Sitting Balance unsupported;sitting in chair  -AR     Static Standing Balance supervision  -AR     Dynamic Standing Balance supervision  -AR     Position/Device Used, Standing Balance supported  -AR               User Key  (r) = Recorded By, (t) = Taken By, (c) = Cosigned By      Initials Name Provider Type    Stephanie Lawson OT Occupational Therapist                   Goals/Plan    No documentation.                  Clinical Impression       Row Name 07/04/25 1238          Pain Assessment    Pretreatment Pain Rating 5/10  -AR     Posttreatment Pain Rating 4/10  -AR     Pain Location elbow  -AR     Pain Side/Orientation left  -AR     Pain Management Interventions activity modification encouraged;cold applied;exercise or physical activity utilized;MD notified;movement retraining implemented;premedicated for activity;positioning techniques utilized;other (see comments)  Issued/fit pt with alternate sling  -AR     Response to Pain Interventions activity participation with decreased pain  -AR        Row Name 07/04/25 1238          Plan of Care Review    Plan of Care Reviewed With patient;spouse  -AR     Progress improving  -AR     Outcome Evaluation OT educated pt and family on shoulder precautions, ADL retraining to maintain, sling management and HEP. Pt and spouse able to manage UB dressing, sling application and HEP post teaching. Issued/fit pt with Donjoy III d/t inability to seat elbow and keep ABD bar from outward rotation with resulting pain and poor positioning. Recommend DC home with initial 24/7 assist and pt and spouse feel comfortable with planned DC home.  -AR       Row Name 07/04/25 1238          Therapy Plan Review/Discharge Plan (OT)    Anticipated Discharge Disposition (OT) home with 24/7 care  -AR       Row Name 07/04/25 1238          Vital Signs    Pre SpO2 (%) 95  -AR     O2 Delivery Pre Treatment room air  -AR     Intra SpO2 (%) 96  -AR     O2 Delivery Intra Treatment room air  -AR     Pre Patient Position Sitting  -AR     Intra Patient Position Standing  -AR     Post Patient Position Sitting  -AR       Row Name 07/04/25 1238          Positioning and Restraints    Pre-Treatment Position sitting in chair/recliner  -AR     Post Treatment Position chair  -AR     In Chair notified nsg;reclined;call light within reach;encouraged to call for assist;exit alarm on;with family/caregiver;waffle cushion;with brace;legs elevated  cold pack applied over incision  -AR               User Key  (r) = Recorded By, (t) = Taken By, (c) = Cosigned By      Initials Name Provider Type    Stephanie Lawson, OT Occupational Therapist                   Outcome Measures       Row Name 07/04/25 1241          How much help from another is currently needed...    Putting on and taking off regular lower body clothing? 2  -AR     Bathing (including washing, rinsing, and drying) 2  -AR     Toileting (which includes using toilet bed pan or urinal) 3  -AR     Putting on and taking off regular upper body clothing 2  -AR      Taking care of personal grooming (such as brushing teeth) 3  -AR     Eating meals 3  -AR     AM-PAC 6 Clicks Score (OT) 15  -AR       Row Name 07/04/25 0957          How much help from another person do you currently need...    Turning from your back to your side while in flat bed without using bedrails? 3  -AN     Moving from lying on back to sitting on the side of a flat bed without bedrails? 3  -AN     Moving to and from a bed to a chair (including a wheelchair)? 3  -AN     Standing up from a chair using your arms (e.g., wheelchair, bedside chair)? 3  -AN     Climbing 3-5 steps with a railing? 3  -AN     To walk in hospital room? 3  -AN     AM-PAC 6 Clicks Score (PT) 18  -AN     Highest Level of Mobility Goal Walk 10 Steps or More-6  -AN       Row Name 07/04/25 1241          Functional Assessment    Outcome Measure Options AM-PAC 6 Clicks Daily Activity (OT)  -AR               User Key  (r) = Recorded By, (t) = Taken By, (c) = Cosigned By      Initials Name Provider Type    Stephanie Lawson OT Occupational Therapist    Denisa Ibarra RN Registered Nurse                    Occupational Therapy Education       Title: PT OT SLP Therapies (In Progress)       Topic: Occupational Therapy (Done)       Point: ADL training (Done)       Learning Progress Summary            Patient Eager, E,TB,D,H, DU,VU by AR at 7/4/2025 1242    Eager, E,TB,D,H, VU by AR at 7/3/2025 1610   Family Eager, E,TB,D,H, DU,VU by AR at 7/4/2025 1242    Eager, E,TB,D,H, VU by AR at 7/3/2025 1610                      Point: Home exercise program (Done)       Learning Progress Summary            Patient Eager, E,TB,D,H, DU,VU by AR at 7/4/2025 1242    Eager, E,TB,D,H, VU by AR at 7/3/2025 1610   Family Eager, E,TB,D,H, DU,VU by AR at 7/4/2025 1242    Eager, E,TB,D,H, VU by AR at 7/3/2025 1610                      Point: Precautions (Done)       Learning Progress Summary            Patient Brenda, E,BENI,D,H, DU,VU by AR at 7/4/2025 4016     Eager, E,TB,D,H, VU by AR at 7/3/2025 1610   Family Eager, E,TB,D,H, DU,VU by AR at 7/4/2025 1242    Eager, E,TB,D,H, VU by AR at 7/3/2025 1610                      Point: Body mechanics (Done)       Learning Progress Summary            Patient Eager, E,TB,D,H, DU,VU by AR at 7/4/2025 1242    Eager, E,TB,D,H, VU by AR at 7/3/2025 1610   Family Eager, E,TB,D,H, DU,VU by AR at 7/4/2025 1242    Eager, E,TB,D,H, VU by AR at 7/3/2025 1610                                      User Key       Initials Effective Dates Name Provider Type Discipline    AR 07/11/23 -  Stephanie Powell, OT Occupational Therapist OT                  OT Recommendation and Plan  Planned Therapy Interventions (OT): BADL retraining, edema control/reduction, IADL retraining, occupation/activity based interventions, functional balance retraining, orthotic fabrication/fitting/training, patient/caregiver education/training, ROM/therapeutic exercise, transfer/mobility retraining  Therapy Frequency (OT): daily  Plan of Care Review  Plan of Care Reviewed With: patient, spouse  Progress: improving  Outcome Evaluation: OT educated pt and family on shoulder precautions, ADL retraining to maintain, sling management and HEP. Pt and spouse able to manage UB dressing, sling application and HEP post teaching. Issued/fit pt with Donjoy III d/t inability to seat elbow and keep ABD bar from outward rotation with resulting pain and poor positioning. Recommend DC home with initial 24/7 assist and pt and spouse feel comfortable with planned DC home.     Time Calculation:   Evaluation Complexity (OT)  Review Occupational Profile/Medical/Therapy History Complexity: brief/low complexity  Assessment, Occupational Performance/Identification of Deficit Complexity: 1-3 performance deficits  Clinical Decision Making Complexity (OT): problem focused assessment/low complexity  Overall Complexity of Evaluation (OT): low complexity     Time Calculation- OT       Row Name 07/04/25  1242             Time Calculation- OT    OT Start Time 1057  -AR      OT Received On 07/04/25  -AR      OT Goal Re-Cert Due Date 07/13/25  -AR         Timed Charges    45334 - OT Therapeutic Exercise Minutes 10  -AR      31029 - OT Self Care/Mgmt Minutes 32  -AR         Total Minutes    Timed Charges Total Minutes 42  -AR       Total Minutes 42  -AR                User Key  (r) = Recorded By, (t) = Taken By, (c) = Cosigned By      Initials Name Provider Type    AR Stephanie Powell OT Occupational Therapist                  Therapy Charges for Today       Code Description Service Date Service Provider Modifiers Qty    90282472227 HC OT SELF CARE/MGMT/TRAIN EA 15 MIN 7/3/2025 Stephanie Powell, OT GO 1    35773056680 HC OT THER PROC EA 15 MIN 7/3/2025 Stephanie Powell, OT GO 1    25207515604 HC OT EVAL LOW COMPLEXITY 4 7/3/2025 Stephanie Powell, OT GO 1    15922989191 HC CAREGIVER TRAINING STRATEGIES & TQ 1ST 30 MINUTES 7/3/2025 Stephanie Powell OT  1    03472652984 HC CAREGIVER TRAINING STRATEGIES &TQ EA ADDL 15 MIN 7/3/2025 Stephanie Powell, OT  1    05939455609 HC OT SELF CARE/MGMT/TRAIN EA 15 MIN 7/4/2025 Stephanie Powell, OT GO 2    14673359424 HC OT THER PROC EA 15 MIN 7/4/2025 Stephanie Powell, OT GO 1                 Stephanie Powell OT  7/4/2025

## 2025-07-04 NOTE — PLAN OF CARE
Goal Outcome Evaluation:  Plan of Care Reviewed With: patient, spouse        Progress: improving  Outcome Evaluation: OT educated pt and family on shoulder precautions, ADL retraining to maintain, sling management and HEP. Pt and spouse able to manage UB dressing, sling application and HEP post teaching. Issued/fit pt with Donjoy III d/t inability to seat elbow and keep ABD bar from outward rotation with resulting pain and poor positioning. Recommend DC home with initial 24/7 assist and pt and spouse feel comfortable with planned DC home.    Anticipated Discharge Disposition (OT): home with 24/7 care

## 2025-07-04 NOTE — DISCHARGE SUMMARY
Patient Name: Shayne Neal  MRN: [id number]  : 1955  DOS: 2025    Attending: Jose Raul Yin MD    Primary Care Provider: Provider, No Known    Date of Admission:.7/3/2025  5:11 AM    Date of Discharge:  2025    Discharge Diagnosis:   S/P revision reverse total shoulder arthroplasty, right    Failed arthroplasty    Asthma    COPD (chronic obstructive pulmonary disease)    CAD (coronary artery disease)    HTN (hypertension)    NYDIA (obstructive sleep apnea)    Chronic systolic heart failure    HLD (hyperlipidemia)    CKD (chronic kidney disease)      Hospital Course    At admit:    Patient is a pleasant 70 y.o. male presented for scheduled surgery by Dr. Yin.  He underwent right revision reverse total shoulder arthroplasty under general anesthesia.  He tolerated surgery well and was admitted for further further medical management.  He known to us from previous admission  on 11/3/2022 for right shoulder arthroplasty and a short time after that surgery he injured the right shoulder.  He has continued to have pain and limited range of motion since.  He reports right hand numbness, tingling and difficulty with .  He states he was going to have the problem repaired at the time of the injury, but had a new cancer diagnosis in the meantime.     When seen postop he is doing well.  His pain is well-controlled.  He denies nausea, shortness of breath or chest pain.  No history of DVT or PE.     [facility] cardiac [facility].    After admit:    Patient was provided pain medications as needed for pain control, along with interscalene nerve block infusion of Ropivacaine.    Adjustments were made to pain medications to optimize postop pain management.   Risks and benefits of opiate medications discussed with patient.  Andrés report in chart was reviewed prior to discharge.    The patient was seen by OT and was taught exercises for his right arm.  The  patient used an IS for atelectasis prophylaxis and mechanicals for DVT prophylaxis.    Home medications were resumed as appropriate, and labs were monitored and remained fairly stable.     With the progress he has made, he is ready for DC home today.      The patient will have an interscalene nerve block ( instructed on it during this admit)  Discussed with patient regarding plan and he shows understanding and agreement.        Procedures Performed    DATE OF OPERATION: 7/3/2025     PREOPERATIVE DIAGNOSIS: right shoulder failed total shoulder replacement due to  subscapularis failure      POSTOPERATIVE DIAGNOSES:  same     PROCEDURES PERFORMED:  1. right revision reverse total shoulder arthroplasty.(humeral and glenoid components)   2. right shoulder removal of total shoulder  arthroplasty (humeral and glenoid components)         SURGEON: Jose Raul Yin MD    Pertinent Test Results:    I reviewed the patient's new clinical results.   Results from last 7 days   Lab Units 07/04/25  0703   WBC 10*3/mm3 9.82   HEMOGLOBIN g/dL 11.2*   HEMATOCRIT % 34.5*   PLATELETS 10*3/mm3 119*     Results from last 7 days   Lab Units 07/04/25  0703 07/03/25  0633   SODIUM mmol/L 141  --    POTASSIUM mmol/L 4.4 4.3   CHLORIDE mmol/L 107  --    CO2 mmol/L 22.0  --    BUN mg/dL 18.0  --    CREATININE mg/dL 1.37*  --    CALCIUM mg/dL 8.3*  --    GLUCOSE mg/dL 186*  --      I reviewed the patient's new imaging including images and reports.      Occupational Therapy: OT educated pt and family on shoulder precautions, ADL retraining to maintain, sling management and HEP. Pt and spouse able to manage UB dressing, sling application and HEP post teaching. Issued/fit pt with Donjoy III d/t inability to seat elbow and keep ABD bar from outward rotation with resulting pain and poor positioning. Recommend DC home with initial 24/7 assist and pt and spouse feel comfortable with planned DC home.       Physical therapy: Pt is indep with mobility.  Cleared for discharge home from a mobility standpoint. Discharge PT.       Discharge Assessment:    Vital Signs  Visit Vitals  /77 (BP Location: Left arm, Patient Position: Sitting)   Pulse 78   Temp 98.2 °F (36.8 °C) (Oral)   Resp 18   SpO2 98%     Temp (24hrs), Av.9 °F (36.6 °C), Min:97.6 °F (36.4 °C), Max:98.4 °F (36.9 °C)      General Appearance:    Alert, cooperative, in no acute distress   Lungs:     Clear to auscultation,respirations regular, even and   unlabored    Heart:    Regular rhythm and normal rate, normal S1 and S2    Abdomen:     Normal bowel sounds, no masses, no organomegaly, soft   non-tender, non-distended, no guarding, no rebound tenderness   Extremities:   RUE in a sling, CDI Aquacel dressing. Interscalene nerve block cath present. Distal pulses, cap refill, movements of fingers, wrist, intact.   Pulses:   Pulses palpable and equal bilaterally   Skin:   No bleeding, bruising or rash   Neurologic:   Cranial nerves 2 - 12 grossly intact       Discharge Disposition: home          Discharge Medications        New Medications        Instructions Start Date   oxyCODONE 5 MG immediate release tablet  Commonly known as: ROXICODONE   5 mg, Oral, Every 4 Hours PRN      ropivacaine 0.2 % infusion (INFUSYSTEM)  Commonly known as: NAROPIN   1 mL/hr (2 mg/hr), Peripheral Nerve, Continuous      Stool Softener 100 MG capsule  Generic drug: docusate sodium   100 mg, Oral, 2 Times Daily             Continue These Medications        Instructions Start Date   bumetanide 1 MG tablet  Commonly known as: BUMEX   1-4 mg, Daily PRN      busPIRone 10 MG tablet  Commonly known as: BUSPAR   10 mg, Daily      carvedilol 6.25 MG tablet  Commonly known as: COREG   6.25 mg, 2 Times Daily With Meals      clonazePAM 0.5 MG tablet  Commonly known as: KlonoPIN   0.5 mg, 2 Times Daily      clopidogrel 75 MG tablet  Commonly known as: PLAVIX   75 mg, Daily      DULoxetine 60 MG capsule  Commonly known as: CYMBALTA   60  mg, 2 Times Daily      empagliflozin 10 MG tablet tablet  Commonly known as: JARDIANCE   10 mg, Daily      ezetimibe 10 MG tablet  Commonly known as: ZETIA   10 mg, Daily      finasteride 5 MG tablet  Commonly known as: PROSCAR   5 mg, Daily      gabapentin 100 MG capsule  Commonly known as: NEURONTIN   200 mg, Nightly      hydrOXYzine 25 MG tablet  Commonly known as: ATARAX   25 mg, Daily PRN      metoclopramide 10 MG tablet  Commonly known as: REGLAN   10 mg, 3 Times Daily Before Meals      nitroglycerin 0.4 MG SL tablet  Commonly known as: NITROSTAT   1 under the tongue as needed for angina, may repeat q5mins for up three doses      omeprazole 20 MG capsule  Commonly known as: priLOSEC   20 mg, Daily      ondansetron 4 MG tablet  Commonly known as: ZOFRAN   Take 1 tablet by mouth Every 8 (Eight) Hours As Needed for post-op nausea      OZEMPIC (0.25 OR 0.5 MG/DOSE) SC   Subcutaneous, Weekly      prazosin 5 MG capsule  Commonly known as: MINIPRESS   10 mg, Nightly      rosuvastatin 40 MG tablet  Commonly known as: CRESTOR   40 mg, Daily      traZODone 100 MG tablet  Commonly known as: DESYREL   300 mg, Nightly      Trelegy Ellipta 100-62.5-25 MCG/ACT inhaler  Generic drug: Fluticasone-Umeclidin-Vilant   1 puff, Daily - RT             Stop These Medications      benzoyl peroxide 5 % external wash  Generic drug: benzoyl peroxide     mupirocin 2 % ointment  Commonly known as: BACTROBAN              Discharge Diet: Regular diet      Activity at Discharge: HALLIE Levine      Follow-up Appointments  Dr. Yin per his orders      AMPARO Peters  07/04/25  12:45 EDT

## 2025-07-04 NOTE — PLAN OF CARE
Goal Outcome Evaluation:  Plan of Care Reviewed With: patient           Outcome Evaluation: Pt is indep with  mobility. Cleared for discharge home from a mobility standpoint. Discharge PT.

## 2025-07-04 NOTE — THERAPY DISCHARGE NOTE
Patient Name: Shayne Neal  : 1955    MRN: [id number]                              Today's Date: 2025       Admit Date: 7/3/2025    Visit Dx:     ICD-10-CM ICD-9-CM   1. Failure of arthroplasty, initial encounter  T84.019A 996.47     V43.60     Patient Active Problem List   Diagnosis    Glenohumeral arthritis, right    Asthma    COPD (chronic obstructive pulmonary disease)    Gout    CAD (coronary artery disease)    HTN (hypertension)    NYDIA (obstructive sleep apnea)    Status post total replacement of right shoulder    Chronic systolic heart failure    Failed arthroplasty    S/P revision reverse total shoulder arthroplasty, right    HLD (hyperlipidemia)    CKD (chronic kidney disease)     Past Medical History:   Diagnosis Date    Asthma     Bladder cancer 2024    Chronic kidney disease     Colon cancer     colon cancer    COPD (chronic obstructive pulmonary disease)     Coronary artery disease     Diabetes mellitus     diet controlled    Elevated cholesterol     Gout     Heart attack     History of cancer chemotherapy     for colon cancer    History of transfusion     after knee surgery and with chemo, no reaction    Hyperlipidemia     Hypertension     Neuropathy     Sleep apnea     wears cpap with 2 liters at night    Stroke     , left sided weakness and periodic muscle jerking     Past Surgical History:   Procedure Laterality Date    CARDIAC CATHETERIZATION      [facility] by Dr. Pastor / [facility]     CARDIAC CATHETERIZATION  2023    dr. pastor with 1 stent    CATARACT EXTRACTION Bilateral     CHOLECYSTECTOMY      COLON RESECTION      COLONOSCOPY      CORONARY ARTERY BYPASS GRAFT      [facility]     CORONARY STENT PLACEMENT      CYSTOSCOPY      INCISIONAL HERNIA REPAIR      abdomen    TOTAL KNEE ARTHROPLASTY Left     TOTAL SHOULDER ARTHROPLASTY Right 2022    Procedure: TOTAL SHOULDER ARTHROPLASTY - RIGHT;  Surgeon: Jose Raul Yin  MD David;  Location: [facility];  Service: Orthopedics;  Laterality: Right;    VENOUS ACCESS DEVICE (PORT) INSERTION      VENOUS ACCESS DEVICE (PORT) REMOVAL        General Information       Row Name 07/04/25 0820          Physical Therapy Time and Intention    Document Type therapy note (daily note)  -LS     Mode of Treatment physical therapy  -       Row Name 07/04/25 0820          General Information    Patient Profile Reviewed yes  -LS     Existing Precautions/Restrictions non-weight bearing;left;shoulder  Donjoy III w/pillow, interscalene, L weakness from CVA, L elbow healing wound  -LS       Row Name 07/04/25 0820          Cognition    Orientation Status (Cognition) oriented x 4  -               User Key  (r) = Recorded By, (t) = Taken By, (c) = Cosigned By      Initials Name Provider Type    LS Phoebe Alberts, PT Physical Therapist                   Mobility       Row Name 07/04/25 0817          Bed Mobility    Bed Mobility supine-sit  -LS     Scooting/Bridging [place] (Bed Mobility) verbal cues  -LS     Supine-Sit [place] (Bed Mobility) verbal cues;minimum assist (75% patient effort)  -     Sit-Supine [place] (Bed Mobility) --  -LS     Assistive Device (Bed Mobility) head of bed elevated  -LS     Comment, (Bed Mobility) Pt said he will be sleeping in a recliner  -       Row Name 07/04/25 0817          Sit-Stand Transfer    Sit-Stand [place] (Transfers) independent  -     Comment, (Sit-Stand Transfer) indep without AD  -       Row Name 07/04/25 0817          Gait/Stairs (Locomotion)    [place] Level (Gait) independent  -LS     Patient was able to Ambulate yes  -LS     Distance in Feet (Gait) 200  -LS     [place] Level (Stairs) modified independence  -LS     Handrail Location (Stairs) left side (ascending);left side (descending)  -     Number of Steps (Stairs) up 3 and down 2 steps with a HR  -LS     Comment, (Gait/Stairs) step-through gait pattern; no LOB.   -       Row Name 07/04/25 0817          Mobility    Extremity Weight-bearing Status right upper extremity  -LS     Right Upper Extremity (Weight-bearing Status) non weight-bearing (NWB)  -LS               User Key  (r) = Recorded By, (t) = Taken By, (c) = Cosigned By      Initials Name Provider Type    Phoebe Knowles, RADHA Physical Therapist                   Obj/Interventions       Row Name 07/04/25 0817          Balance    Static Standing Balance independent  -LS     Dynamic Standing Balance independent  -LS     Position/Device Used, Standing Balance unsupported  -LS               User Key  (r) = Recorded By, (t) = Taken By, (c) = Cosigned By      Initials Name Provider Type    Phoebe Knowles, PT Physical Therapist                   Goals/Plan       Row Name 07/04/25 0817          Bed Mobility Goal 1 (PT)    Progress/Outcomes (Bed Mobility Goal 1, PT) goal not met  pt needed min A sup to sit. he said he is going to sleep in a recliner at home.  -       Row Name 07/04/25 0817          Transfer Goal 1 (PT)    Progress/Outcome (Transfer Goal 1, PT) goal met  pt is indep with transfers and amb without AD. he would be indep transitioning between a chair and bed  -       Row Name 07/04/25 0817          Gait Training Goal 1 (PT)    Progress/Outcome (Gait Training Goal 1, PT) goal partially met  -       Row Name 07/04/25 0817          Stairs Goal 1 (PT)    Progress/Outcome (Stairs Goal 1, PT) --  met using  a HR; pt said he can hold to doorframe if needed at home.  -LS               User Key  (r) = Recorded By, (t) = Taken By, (c) = Cosigned By      Initials Name Provider Type    Phoebe Knowles, PT Physical Therapist                   Clinical Impression       Row Name 07/04/25 0817          Pain    Pretreatment Pain Rating 5/10  -LS     Posttreatment Pain Rating 5/10  -LS     Pain Location shoulder  -LS     Pain Side/Orientation left  -LS     Pain Management Interventions positioning  techniques utilized  -LS     Response to Pain Interventions activity participation with tolerable pain  -LS       Row Name 07/04/25 0817          Plan of Care Review    Plan of Care Reviewed With patient  -LS     Outcome Evaluation Pt is indep with  mobility. Cleared for discharge home from a mobility standpoint. Discharge PT.  -LS               User Key  (r) = Recorded By, (t) = Taken By, (c) = Cosigned By      Initials Name Provider Type    Phoebe Knowles, PT Physical Therapist                   Outcome Measures       Row Name 07/04/25 0957 07/04/25 0817       How much help from another person do you currently need...    Turning from your back to your side while in flat bed without using bedrails? 3  -AN 3  -LS    Moving from lying on back to sitting on the side of a flat bed without bedrails? 3  -AN 3  -LS    Moving to and from a bed to a chair (including a wheelchair)? 3  -AN 4  -LS    Standing up from a chair using your arms (e.g., wheelchair, bedside chair)? 3  -AN 4  -LS    Climbing 3-5 steps with a railing? 3  -AN 4  -LS    To walk in hospital room? 3  -AN 4  -LS    AM-PAC 6 Clicks Score (PT) 18  -AN 22  -LS    Highest Level of Mobility Goal Walk 10 Steps or More-6  -AN Walk 25 Feet or More-7  -LS      Row Name 07/04/25 1241 07/04/25 0817       Functional Assessment    Outcome Measure Options AM-PAC 6 Clicks Daily Activity (OT)  -AR AM-PAC 6 Clicks Basic Mobility (PT)  -LS              User Key  (r) = Recorded By, (t) = Taken By, (c) = Cosigned By      Initials Name Provider Type    Stephanie Lawson, OT Occupational Therapist    Phoebe Knowles, PT Physical Therapist    Denisa Ibarra RN Registered Nurse                  Physical Therapy Education       Title: PT OT SLP Therapies (Done)       Topic: Physical Therapy (Done)       Point: Mobility training (Done)       Learning Progress Summary            Patient Acceptance, E,D, VU,NR by AB at 7/3/2025 8473                      Point:  Home exercise program (Done)       Learning Progress Summary            Patient Acceptance, E, VU by  at 7/4/2025 0817    Comment: Benefits of ambulation.                      Point: Body mechanics (Done)       Learning Progress Summary            Patient Acceptance, E,D, VU,NR by AB at 7/3/2025 1407                      Point: Precautions (Done)       Learning Progress Summary            Patient Acceptance, E,D, VU,NR by AB at 7/3/2025 1407                                      User Key       Initials Effective Dates Name Provider Type Discipline     07/11/23 -  Phoebe Alberts, PT Physical Therapist PT    AB 09/22/22 -  Florence Newman PT Physical Therapist PT                  PT Recommendation and Plan     Outcome Evaluation: Pt is indep with  mobility. Cleared for discharge home from a mobility standpoint. Discharge PT.     Time Calculation:         PT Charges       Row Name 07/04/25 0817             Time Calculation    Start Time 0817  -LS      PT Received On 07/04/25  -         Timed Charges    32809 - PT Therapeutic Activity Minutes 15  -LS         Total Minutes    Timed Charges Total Minutes 15  -LS       Total Minutes 15  -LS                User Key  (r) = Recorded By, (t) = Taken By, (c) = Cosigned By      Initials Name Provider Type    Phoebe Knowles, PT Physical Therapist                  Therapy Charges for Today       Code Description Service Date Service Provider Modifiers Qty    33470458847 HC PT THERAPEUTIC ACT EA 15 MIN 7/4/2025 Phoebe Alberts, PT GP 1            PT G-Codes  Outcome Measure Options: AM-PAC 6 Clicks Daily Activity (OT)  AM-PAC 6 Clicks Score (PT): 18  AM-PAC 6 Clicks Score (OT): 15         Phoebe Alberts PT  7/4/2025

## 2025-07-07 ENCOUNTER — READMISSION MANAGEMENT (OUTPATIENT)
Dept: CALL CENTER | Facility: HOSPITAL | Age: 70
End: 2025-07-07
Payer: MEDICARE

## 2025-07-07 NOTE — OUTREACH NOTE
Prep Survey      Flowsheet Row Responses   [facility] patient discharged from? [facility]   Is LACE score < 7 ? No   Eligibility Readm Mgmt   Discharge diagnosis S/P revision reverse total shoulder arthroplasty, right   Does the patient have one of the following disease processes/diagnoses(primary or secondary)? General Surgery   Prep survey completed? Yes            Andreea URIBE - Registered Nurse

## 2025-07-10 ENCOUNTER — READMISSION MANAGEMENT (OUTPATIENT)
Dept: CALL CENTER | Facility: HOSPITAL | Age: 70
End: 2025-07-10
Payer: MEDICARE

## 2025-07-10 NOTE — OUTREACH NOTE
General Surgery Week 1 Survey      Flowsheet Row Responses   [facility] patient discharged from? [place]   Does the patient have one of the following disease processes/diagnoses(primary or secondary)? General Surgery   Week 1 attempt successful? Yes   Call start time 1648   Call end time 1655   Discharge diagnosis S/P revision reverse total shoulder arthroplasty, right   Person spoke with today (if not patient) and relationship Patient   Medication alerts for this patient Oxycodone, Ropivacaine, Docusate Sodium   Meds reviewed with patient/caregiver? Yes   Is the patient having any side effects they believe may be caused by any medication additions or changes? No   Does the patient have all medications related to this admission filled (includes all antibiotics, pain medications, etc.) Yes   Prescription comments Patient states he accidentally pulled the Ropivacaine ball out. He is taking Oxycodone for pain and it has helped.   Is the patient taking all medications as directed (includes completed medication regime)? Yes   Does the patient have a follow up appointment scheduled with their surgeon? Yes   Has the patient kept scheduled appointments due by today? N/A   Has home health visited the patient within 72 hours of discharge? N/A   Psychosocial issues? No   Comments Patient states he is doing ok following his shoulder surgery. He is wearing the sling as advised. Incision is covered with dressing. Patient is sponge bathing.   Did the patient receive a copy of their discharge instructions? Yes   Nursing interventions Reviewed instructions with patient   What is the patient's perception of their health status since discharge? Improving   Is the patient /caregiver able to teach back basic post-op care? Take showers only when approved by MD-sponge bathe until then, No tub bath, swimming, or hot tub until instructed by MD, Keep incision areas clean,dry and protected, Lifting as instructed by MD in discharge  instructions   Is the patient/caregiver able to teach back signs and symptoms of incisional infection? Increased redness, swelling or pain at the incisonal site, Increased drainage or bleeding, Incisional warmth, Pus or odor from incision, Fever   Is the patient/caregiver able to teach back the hierarchy of who to call/visit for symptoms/problems? PCP, Specialist, Home health nurse, Urgent Care, ED, 911 Yes   Week 1 call completed? Yes   Graduated Yes   Is the patient interested in additional calls from an ambulatory ? No   Would this patient benefit from a Referral to [facility] Social Work? No   Wrap up additional comments Patient understands post op instructions and limitations. This is the patient's second shoulder surgery. No needs or concerns at this time.   Call end time 2835            Zeinab BUSBY - Registered Nurse

## (undated) DEVICE — T-MAX DISPOSABLE FACE MASK 8 PER BOX

## (undated) DEVICE — PULLOVER TOGA, 2X LARGE: Brand: FLYTE, SURGICOOL

## (undated) DEVICE — 1010 S-DRAPE TOWEL DRAPE 10/BX: Brand: STERI-DRAPE™

## (undated) DEVICE — DRP SURG U/DRP INVISISHIELD PA 48X52IN

## (undated) DEVICE — INTENDED FOR TISSUE SEPARATION, AND OTHER PROCEDURES THAT REQUIRE A SHARP SURGICAL BLADE TO PUNCTURE OR CUT.: Brand: BARD-PARKER ® CARBON RIB-BACK BLADES

## (undated) DEVICE — ST PIN FIX TEMP UNIVERSREVERS W/BRKAWAY/GUIDE/PIN 2.4MM STRL

## (undated) DEVICE — 3 BONE CEMENT MIXER: Brand: MIXEVAC

## (undated) DEVICE — PATIENT RETURN ELECTRODE, SINGLE-USE, CONTACT QUALITY MONITORING, ADULT, WITH 9FT CORD, FOR PATIENTS WEIGING OVER 33LBS. (15KG): Brand: MEGADYNE

## (undated) DEVICE — PENCL SMOKE/EVAC MEGADYNE TELESCP 10FT

## (undated) DEVICE — 3M™ IOBAN™ 2 ANTIMICROBIAL INCISE DRAPE 6651EZ: Brand: IOBAN™ 2

## (undated) DEVICE — SOL ANTISEP SCRB CHG 4PCT 4OZ

## (undated) DEVICE — GLV SURG SENSICARE PI ORTHO SZ7.5 LF STRL

## (undated) DEVICE — SOL ISO/ALC RUB 70PCT 4OZ

## (undated) DEVICE — GLV SURG SENSICARE PI MIC PF SZ7.5 LF STRL

## (undated) DEVICE — 450 ML BOTTLE OF 0.05% CHLORHEXIDINE GLUCONATE IN 99.95% STERILE WATER FOR IRRIGATION, USP AND APPLICATOR.: Brand: IRRISEPT ANTIMICROBIAL WOUND LAVAGE

## (undated) DEVICE — OSCILLATING TIP SAW CARTRIDGE: Brand: PRECISION FALCON

## (undated) DEVICE — DRAPE,TOWEL,LARGE,INVISISHIELD: Brand: MEDLINE

## (undated) DEVICE — SWABSTK SKINPREP PVPI PRE/SAT 8IN STRL

## (undated) DEVICE — TBG PENCL TELESCP MEGADYNE SMOKE EVAC 10FT

## (undated) DEVICE — ELECTRD BLD EZ CLN STD 6.5IN

## (undated) DEVICE — 3M™ STERI-DRAPE™ U-DRAPE 1015: Brand: STERI-DRAPE™

## (undated) DEVICE — ANTIBACTERIAL UNDYED BRAIDED (POLYGLACTIN 910), SYNTHETIC ABSORBABLE SUTURE: Brand: COATED VICRYL

## (undated) DEVICE — DRAPE,REIN 53X77,STERILE: Brand: MEDLINE

## (undated) DEVICE — ELECTRD BLD EZ CLN STD 2.5IN

## (undated) DEVICE — SYR SLP TP 10ML DISP

## (undated) DEVICE — SUT ETHLN 3/0 FS1 30IN 669H

## (undated) DEVICE — UNDERGLV SURG BIOGEL INDICAT PI SZ8 BLU

## (undated) DEVICE — UNDERGLV SURG BIOGEL INDICAT PF 61/2 GRN

## (undated) DEVICE — GLV SURG PREMIERPRO MIC LTX PF SZ6.5 BRN

## (undated) DEVICE — PK MAJ SHLDR SPLT 10

## (undated) DEVICE — KT PUMP INFUBLOCK MDL 2100 PMKITSOLIS

## (undated) DEVICE — SPNG GZ WOVN 4X4IN 12PLY 10/BX STRL

## (undated) DEVICE — BLANKT WARM LOWR/BDY 100X120CM

## (undated) DEVICE — SUT VIC 2/0 CT2 27IN J269H

## (undated) DEVICE — SKN PREP SPNG STKS PVP PNT STR: Brand: MEDLINE INDUSTRIES, INC.

## (undated) DEVICE — BIT DRL UNIVERS REVERS MODULAR/GLEN 3MM STRL

## (undated) DEVICE — REAMR ANG UNIVERSREVERS GLEN SM DISP

## (undated) DEVICE — INTENDED TO SUPPORT AND MAINTAIN THE POSITION OF AN ANESTHETIZED PATIENT DURING SURGERY: Brand: ERIN BEACH CHAIR FACE MASK